# Patient Record
Sex: FEMALE | Race: WHITE | NOT HISPANIC OR LATINO | Employment: FULL TIME | URBAN - METROPOLITAN AREA
[De-identification: names, ages, dates, MRNs, and addresses within clinical notes are randomized per-mention and may not be internally consistent; named-entity substitution may affect disease eponyms.]

---

## 2018-07-26 ENCOUNTER — RESULT FOLLOW UP (OUTPATIENT)
Dept: OBGYN | Facility: CLINIC | Age: 33
End: 2018-07-26

## 2018-07-26 ENCOUNTER — OFFICE VISIT (OUTPATIENT)
Dept: OBGYN | Facility: CLINIC | Age: 33
End: 2018-07-26
Payer: COMMERCIAL

## 2018-07-26 VITALS
SYSTOLIC BLOOD PRESSURE: 116 MMHG | DIASTOLIC BLOOD PRESSURE: 64 MMHG | HEIGHT: 66 IN | WEIGHT: 292 LBS | HEART RATE: 68 BPM | BODY MASS INDEX: 46.93 KG/M2

## 2018-07-26 DIAGNOSIS — B37.2 CUTANEOUS CANDIDIASIS: ICD-10-CM

## 2018-07-26 DIAGNOSIS — Z01.419 ENCOUNTER FOR GYNECOLOGICAL EXAMINATION WITHOUT ABNORMAL FINDING: Primary | ICD-10-CM

## 2018-07-26 DIAGNOSIS — R87.810 CERVICAL HIGH RISK HPV (HUMAN PAPILLOMAVIRUS) TEST POSITIVE: ICD-10-CM

## 2018-07-26 DIAGNOSIS — L90.0 LICHEN SCLEROSUS: ICD-10-CM

## 2018-07-26 DIAGNOSIS — E66.01 MORBID OBESITY (H): ICD-10-CM

## 2018-07-26 PROCEDURE — G0145 SCR C/V CYTO,THINLAYER,RESCR: HCPCS | Performed by: NURSE PRACTITIONER

## 2018-07-26 PROCEDURE — 99395 PREV VISIT EST AGE 18-39: CPT | Performed by: NURSE PRACTITIONER

## 2018-07-26 PROCEDURE — 87624 HPV HI-RISK TYP POOLED RSLT: CPT | Performed by: NURSE PRACTITIONER

## 2018-07-26 RX ORDER — CLOBETASOL PROPIONATE 0.5 MG/G
OINTMENT TOPICAL
Qty: 30 G | Refills: 2 | Status: SHIPPED | OUTPATIENT
Start: 2018-07-26 | End: 2018-09-26

## 2018-07-26 RX ORDER — NYSTATIN 100000 [USP'U]/G
POWDER TOPICAL 3 TIMES DAILY PRN
Qty: 60 G | Refills: 3 | Status: SHIPPED | OUTPATIENT
Start: 2018-07-26 | End: 2022-02-09

## 2018-07-26 ASSESSMENT — ANXIETY QUESTIONNAIRES
5. BEING SO RESTLESS THAT IT IS HARD TO SIT STILL: NOT AT ALL
7. FEELING AFRAID AS IF SOMETHING AWFUL MIGHT HAPPEN: NOT AT ALL
IF YOU CHECKED OFF ANY PROBLEMS ON THIS QUESTIONNAIRE, HOW DIFFICULT HAVE THESE PROBLEMS MADE IT FOR YOU TO DO YOUR WORK, TAKE CARE OF THINGS AT HOME, OR GET ALONG WITH OTHER PEOPLE: NOT DIFFICULT AT ALL
GAD7 TOTAL SCORE: 3
3. WORRYING TOO MUCH ABOUT DIFFERENT THINGS: NOT AT ALL
2. NOT BEING ABLE TO STOP OR CONTROL WORRYING: SEVERAL DAYS
1. FEELING NERVOUS, ANXIOUS, OR ON EDGE: SEVERAL DAYS
6. BECOMING EASILY ANNOYED OR IRRITABLE: NOT AT ALL

## 2018-07-26 ASSESSMENT — PATIENT HEALTH QUESTIONNAIRE - PHQ9: 5. POOR APPETITE OR OVEREATING: SEVERAL DAYS

## 2018-07-26 NOTE — PROGRESS NOTES
Melinda is a 32 year old  female who presents for annual exam.     Besides routine health maintenance, she has no other health concerns today .    HPI:  The patient's PCP is  [facility].  Pt here today for her annual exam. She is feeling okay. She is working on a weight loss program with her daughter. She is frustrated because she can't loose any weight.     She has a hx of endometriosis, s/p LASH BSO. No HRT. No hot flashes, some vaginal dryness and itching but she also has lichen sclerosis that she hasn't treated in quite some time.     She c/o yeast infections and sores under her breasts and under her abdominal fold/pannus due to sweat and her obesity. She tries to keep it dry but also will apply creams to the area.     GYNECOLOGIC HISTORY:    No LMP recorded. Patient has had a hysterectomy.  Her current contraception method is: none.  She  reports that she has never smoked. She has never used smokeless tobacco.    Patient is sexually active.  STD testing offered?  Declined  Last PHQ-9 score on record =   PHQ-9 SCORE 2018   Total Score 2     Last GAD7 score on record =   BERTIN-7 SCORE 2018   Total Score 3     Alcohol Score = 2    HEALTH MAINTENANCE:  Cholesterol: 16  Total= 192, Triglycerides=100, HDL=35, ESH=760    Cholesterol   Date Value Ref Range Status   2016 192 <200 mg/dL Final   2013 188 125 - 200 mg/dL Final      Last Mammo: never, Result: not applicable, Next Mammo: today due at age 38.  Pap: 13 NIL   Lab Results   Component Value Date    PAP NIL 2013     Colonoscopy:  NEVER, Result: not applicable, Next Colonoscopy: DUE AT AGE 48.  Dexa:  NEVER    Health maintenance updated:  yes    HISTORY:  Obstetric History       T2      L2     SAB0   TAB0   Ectopic0   Multiple0   Live Births2       # Outcome Date GA Lbr Toni/2nd Weight Sex Delivery Anes PTL Lv   2 Term 08 39w0d  8 lb (3.629 kg) M -SEC   SIERRA      Name: Chon Baum  Term  40w0d  8 lb 3 oz (3.714 kg) F -SEC   [facility]ERRA      Name: Anastasiia          Patient Active Problem List   Diagnosis     Morbid obesity (H)     Past Surgical History:   Procedure Laterality Date      SECTION  ,      CHOLECYSTECTOMY  2008     DILATION AND CURETTAGE  2008, 2009-inactive and secretory endometrium; -secretory endometrium     LAPAROSCOPIC HYSTERECTOMY SUPRACERVICAL, BILATERAL SALPINGO-OOPHORECTOMY, COMBINED  2009    inactive endometrium;bilateral endometriosis in right and left ovaries     LAPAROSCOPY DIAGNOSTIC (GYN)  , ,      TONSILLECTOMY  2010      Social History   Substance Use Topics     Smoking status: Never Smoker     Smokeless tobacco: Never Used     Alcohol use No      Problem (# of Occurrences) Relation (Name,Age of Onset)    Diabetes (2) Maternal Grandmother, Paternal Grandmother    HEART DISEASE (2) Maternal Grandmother, Paternal Grandfather    Hyperlipidemia (3) Maternal Grandmother, Mother, Other: Aunt, unspecified    Hypertension (3) Maternal Grandmother, Mother, Other: Aunt, unspecified    Osteoperosis (1) Maternal Grandmother    Thyroid Disease (2) Maternal Grandmother, Mother            Current Outpatient Prescriptions   Medication Sig     clobetasol (TEMOVATE) 0.05 % ointment Apply sparingly to affected area twice daily for 2 weeks, then take 2 weeks off and repeat.  Do not apply to face.     nystatin (MYCOSTATIN) 452675 UNIT/GM POWD Apply topically 3 times daily as needed To dry skin     SUMAtriptan Succinate (IMITREX PO) Take 50 mg by mouth every 8 hours as needed.       topiramate (TOPAMAX) 100 MG tablet TAKE 1 TABLET BY MOUTH TWICE A DAY     No current facility-administered medications for this visit.      Allergies   Allergen Reactions     Amoxicillin Hives     Penicillins        Past medical, surgical, social and family histories were reviewed and updated in EPIC.    ROS:   12 point review of systems negative  "other than symptoms noted below.  Gastrointestinal: Heartburn  Genitourinary: Vaginal Dryness and Vaginal Itching  Neurologic: Headaches  Musculoskeletal: Joint Pain    EXAM:  /64  Pulse 68  Ht 5' 6\" (1.676 m)  Wt 292 lb (132.5 kg)  BMI 47.13 kg/m2   BMI: Body mass index is 47.13 kg/(m^2).    PHYSICAL EXAM:  Constitutional:  Appearance: Well nourished, well developed, alert, in no acute distress  Neck:  Lymph Nodes:  No lymphadenopathy present    Thyroid:  Gland size normal, nontender, no nodules or masses present  on palpation  Chest:  Respiratory Effort:  Breathing unlabored  Cardiovascular:    Heart: Auscultation:  Regular rate, normal rhythm, no murmurs present  Breasts: Inspection of Breasts:  No lymphadenopathy present., Palpation of Breasts and Axillae:  No masses present on palpation, no breast tenderness., Axillary Lymph Nodes:  No lymphadenopathy present. and No nodularity, asymmetry or nipple discharge bilaterally.  Gastrointestinal:   Abdominal Examination:  Abdomen nontender to palpation, tone normal without rigidity or guarding, no masses present, umbilicus without lesions   Liver and Spleen:  No hepatomegaly present, liver nontender to palpation    Hernias:  No hernias present  Lymphatic: Lymph Nodes:  No other lymphadenopathy present  Skin:  General Inspection:  No rashes present, no lesions present, no areas of  Discoloration- scar red, no signs of infection. Irritated. Needs nystatin powder.     Genitalia and Groin:  No rashes present, no lesions present, no areas of  discoloration, no masses present  Neurologic/Psychiatric:    Mental Status:  Oriented X3     Pelvic Exam:  External Genitalia:     Normal appearance for age, no discharge present, no tenderness present, no inflammatory lesions present, color normal- white epithelium on bilateral labia majora and at introitus.   Vagina:     Normal vaginal vault without central or paravaginal defects, no discharge present, no " inflammatory lesions present, no masses present  Bladder:     Nontender to palpation  Urethra:   Urethral Body:  Urethra palpation normal, urethra structural support normal   Urethral Meatus:  No erythema or lesions present  Cervix:    Appearance healthy, no lesions present, nontender to palpation, no bleeding present  Uterus:     Surgically absent  Adnexa:     Surgically absent  Perineum:     Perineum within normal limits, no evidence of trauma, no rashes or skin lesions present  Anus:     Anus within normal limits, no hemorrhoids present  Inguinal Lymph Nodes:     No lymphadenopathy present  Pubic Hair:     Normal pubic hair distribution for age  Genitalia and Groin:     No rashes present, no lesions present, no areas of discoloration, no masses present      COUNSELING:   Special attention given to:        Regular exercise       Healthy diet/nutrition    BMI: Body mass index is 47.13 kg/(m^2).  Weight management plan: Discussed healthy diet and exercise guidelines and patient will follow up in 12 months in clinic to re-evaluate.    ASSESSMENT:  32 year old female with satisfactory annual exam.    ICD-10-CM    1. Encounter for gynecological examination without abnormal finding Z01.419 Pap imaged thin layer screen with HPV - recommended age 30 - 65     HPV High Risk Types DNA Cervical   2. Lichen sclerosus L90.0 clobetasol (TEMOVATE) 0.05 % ointment   3. Morbid obesity (H) E66.01    4. Cutaneous candidiasis B37.2 nystatin (MYCOSTATIN) 801871 UNIT/GM POWD       PLAN:  Morbidly obese female s/p [facility] BSO-discussed vaginal atrophy and vaginal E2 cream. Pt will call if she needs it. Manages with lubricants at this time. Annual pap screening recommended. Refill of clobetasol and she will start using nystatin powder under her breasts and her abdominal pannus for veroncia.     Antonina Penny APRN CNP

## 2018-07-26 NOTE — MR AVS SNAPSHOT
"              After Visit Summary   7/26/2018    Melinda Ravi    MRN: [id number]           Patient Information     Date Of Birth          1985        Visit Information        Provider Department      7/26/2018 7:30 AM Antonina Penny APRN CNP [facility]        Today's Diagnoses     Encounter for gynecological examination without abnormal finding    -  1    Lichen sclerosus        Morbid obesity (H)        Cutaneous candidiasis           Follow-ups after your visit        Who to contact     If you have questions or need follow up information about today's clinic visit or your schedule please contact [facility] directly at [phone].  Normal or non-critical lab and imaging results will be communicated to you by MyChart, letter or phone within 4 business days after the clinic has received the results. If you do not hear from us within 7 days, please contact the clinic through MyChart or phone. If you have a critical or abnormal lab result, we will notify you by phone as soon as possible.  Submit refill requests through [email or web address] or call your pharmacy and they will forward the refill request to us. Please allow 3 business days for your refill to be completed.          Additional Information About Your Visit        Care EveryWhere ID     This is your Care EveryWhere ID. This could be used by other organizations to access your [place] medical records  [id number]        Your Vitals Were     Pulse Height BMI (Body Mass Index)             68 5' 6\" (1.676 m) 47.13 kg/m2          Blood Pressure from Last 3 Encounters:   07/26/18 116/64   09/02/16 114/74   08/13/12 110/66    Weight from Last 3 Encounters:   07/26/18 292 lb (132.5 kg)   09/02/16 279 lb (126.6 kg)              Today, you had the following     No orders found for display       Primary Care Provider Office Phone # Fax #    [facility] [phone] [address] [facility] [address]        Equal Access to Services     [facility] RAFAEL : Hadii aad ku hadcarolinetoney La, wakarinewinnie perdomo, qaabhilashkelsie beemaryan smyth. So [facility] [phone].    ATENCIÓN: Si habla español, tiene a tariq disposición servicios gratuitos de asistencia lingüística. [facility] [phone].    We comply with applicable federal civil rights laws and [place] laws. We do not discriminate on the basis of race, color, national origin, age, disability, sex, sexual orientation, or gender identity.            Thank you!     Thank you for choosing [facility]  for your care. Our goal is always to provide you with excellent care. Hearing back from our patients is one way we can continue to improve our services. Please take a few minutes to complete the written survey that you may receive in the mail after your visit with us. Thank you!             Your Updated Medication List - Protect others around you: Learn how to safely use, store and throw away your medicines at www.disposemymeds.org.          This list is accurate as of 7/26/18  8:19 AM.  Always use your most recent med list.                   Brand Name Dispense Instructions for use Diagnosis    clobetasol 0.05 % ointment    TEMOVATE    30 g    Apply sparingly to affected area twice daily for 14 days, then apply qhs x 6 weeks.  Do not apply to face.    Lichen sclerosus       IMITREX PO      Take 50 mg by mouth every 8 hours as needed.        topiramate 100 MG tablet    TOPAMAX     TAKE 1 TABLET BY MOUTH TWICE A DAY

## 2018-07-26 NOTE — LETTER
July 23, 2019      Melinda [place]  [address] [facility] [address]    Dear MsYovannyluis,      At [facility], your health and wellness is our primary concern. That is why we are following up on a colposcopy from 08/07/18, which was reported as CARLINE 1. Your provider had recommended that you have a Pap smear and HPV test completed by 08/07/19. Our records do not show that this has been scheduled.    It is important to complete the follow up that your provider has suggested for you to ensure that there are no worsening changes which may, over time, develop into cancer.      Please contact our office at  [phone] to schedule an appointment for a Pap smear and HPV test at your earliest convenience. If you have questions or concerns, please call the clinic and we will be happy to assist you.    If you have completed the tests outside of [facility], please have the results forwarded to our office. We will update the chart for your primary Physician to review before your next annual physical.     Thank you for choosing [facility]!    Sincerely,      Your [facility] Care Team/[facility]

## 2018-07-27 ASSESSMENT — ANXIETY QUESTIONNAIRES: GAD7 TOTAL SCORE: 3

## 2018-07-27 ASSESSMENT — PATIENT HEALTH QUESTIONNAIRE - PHQ9: SUM OF ALL RESPONSES TO PHQ QUESTIONS 1-9: 2

## 2018-07-30 LAB
COPATH REPORT: NORMAL
PAP: NORMAL

## 2018-07-31 LAB
FINAL DIAGNOSIS: ABNORMAL
HPV HR 12 DNA CVX QL NAA+PROBE: POSITIVE
HPV16 DNA SPEC QL NAA+PROBE: POSITIVE
HPV18 DNA SPEC QL NAA+PROBE: NEGATIVE
SPECIMEN DESCRIPTION: ABNORMAL
SPECIMEN SOURCE CVX/VAG CYTO: ABNORMAL

## 2018-07-31 NOTE — PROGRESS NOTES
7/26/18 NIL pap, + HR HPV 16 & other.  Plan: colposcopy by 10/26/18  8/1/18 advised of result and follow up  8/7/18 Colpo: ECC-benign.  Bx-CARLINE 1. Plan: cotest in 1 year  07/23/19 Cotest reminder letter sent. ([facility])  08/21/19 [facility] clinic and schedule. ([facility])  09/18/19 Patient is lost to pap tracking follow-up. FYI routed to provider. ([facility])

## 2018-08-07 ENCOUNTER — OFFICE VISIT (OUTPATIENT)
Dept: OBGYN | Facility: CLINIC | Age: 33
End: 2018-08-07
Payer: COMMERCIAL

## 2018-08-07 VITALS
WEIGHT: 292 LBS | HEIGHT: 66 IN | SYSTOLIC BLOOD PRESSURE: 112 MMHG | BODY MASS INDEX: 46.93 KG/M2 | DIASTOLIC BLOOD PRESSURE: 64 MMHG

## 2018-08-07 DIAGNOSIS — N90.4 LICHEN SCLEROSUS ET ATROPHICUS OF THE VULVA: ICD-10-CM

## 2018-08-07 DIAGNOSIS — R87.810 CERVICAL HIGH RISK HPV (HUMAN PAPILLOMAVIRUS) TEST POSITIVE: ICD-10-CM

## 2018-08-07 DIAGNOSIS — Z01.812 PRE-PROCEDURE LAB EXAM: Primary | ICD-10-CM

## 2018-08-07 LAB — BETA HCG QUAL IFA URINE: NEGATIVE

## 2018-08-07 PROCEDURE — 84703 CHORIONIC GONADOTROPIN ASSAY: CPT | Performed by: OBSTETRICS & GYNECOLOGY

## 2018-08-07 PROCEDURE — 57454 BX/CURETT OF CERVIX W/SCOPE: CPT | Performed by: OBSTETRICS & GYNECOLOGY

## 2018-08-07 PROCEDURE — 88305 TISSUE EXAM BY PATHOLOGIST: CPT | Performed by: OBSTETRICS & GYNECOLOGY

## 2018-08-07 RX ORDER — BETAMETHASONE DIPROPIONATE 0.5 MG/G
CREAM TOPICAL
Qty: 50 G | Refills: 3 | Status: SHIPPED | OUTPATIENT
Start: 2018-08-07 | End: 2019-02-15

## 2018-08-07 NOTE — MR AVS SNAPSHOT
"              After Visit Summary   8/7/2018    Melinda Ravi    MRN: [id number]           Patient Information     Date Of Birth          1985        Visit Information        Provider Department      8/7/2018 10:00 AM Hilary Salazar MD; WE COLPOSCOPE 1 [facility]        Today's Diagnoses     Pre-procedure lab exam    -  1    Cervical high risk HPV (human papillomavirus) test positive        Lichen sclerosus et atrophicus of the vulva           Follow-ups after your visit        Who to contact     If you have questions or need follow up information about today's clinic visit or your schedule please contact [facility] directly at [phone].  Normal or non-critical lab and imaging results will be communicated to you by MyChart, letter or phone within 4 business days after the clinic has received the results. If you do not hear from us within 7 days, please contact the clinic through MyChart or phone. If you have a critical or abnormal lab result, we will notify you by phone as soon as possible.  Submit refill requests through [employer] or call your pharmacy and they will forward the refill request to us. Please allow 3 business days for your refill to be completed.          Additional Information About Your Visit        Care EveryWhere ID     This is your Care EveryWhere ID. This could be used by other organizations to access your [place] medical records  [id number]        Your Vitals Were     Height BMI (Body Mass Index)                5' 6\" (1.676 m) 47.13 kg/m2           Blood Pressure from Last 3 Encounters:   08/07/18 112/64   07/26/18 116/64   09/02/16 114/74    Weight from Last 3 Encounters:   08/07/18 292 lb (132.5 kg)   07/26/18 292 lb (132.5 kg)   09/02/16 279 lb (126.6 kg)              We Performed the Following     Beta HCG Qual, Urine - [facility] and [place] ([id number])     COLP CERVIX/UPPER VAGINA W ENDOCERV CURETT     Surgical " pathology exam          Today's Medication Changes          These changes are accurate as of 8/7/18 10:42 AM.  If you have any questions, ask your nurse or doctor.               Start taking these medicines.        Dose/Directions    augmented betamethasone dipropionate 0.05 % cream   Commonly known as:  DIPROLENE-AF   Used for:  Lichen sclerosus et atrophicus of the vulva   Started by:  Hilary Salazar MD        Apply sparingly to affected area  twice daily for 14 days.  Do not apply to face.   Quantity:  50 g   Refills:  3            Where to get your medicines      These medications were sent to [facility] [address]  [address], [facility] [address]     Phone:  [phone]     augmented betamethasone dipropionate 0.05 % cream                Primary Care Provider Office Phone # Fax #    [facility] [phone] [address] [place] [address]        Equal Access to Services     [facility]: Hadii yoan ku hadasho Soomaali, waaxda luqadaha, qaybta kaalmada adeegyada, waxay juhiin haywilton estrada . So [facility] [phone].    ATENCIÓN: Si habla español, tiene a tariq disposición servicios gratuitos de asistencia lingüística. [facility] [phone].    We comply with applicable federal civil rights laws and [place] laws. We do not discriminate on the basis of race, color, national origin, age, disability, sex, sexual orientation, or gender identity.            Thank you!     Thank you for choosing [facility]  for your care. Our goal is always to provide you with excellent care. Hearing back from our patients is one way we can continue to improve our services. Please take a few minutes to complete the written survey that you may receive in the mail after your visit with us. Thank you!             Your Updated Medication List - Protect  others around you: Learn how to safely use, store and throw away your medicines at www.disposemymeds.org.          This list is accurate as of 8/7/18 10:42 AM.  Always use your most recent med list.                   Brand Name Dispense Instructions for use Diagnosis    augmented betamethasone dipropionate 0.05 % cream    DIPROLENE-AF    50 g    Apply sparingly to affected area  twice daily for 14 days.  Do not apply to face.    Lichen sclerosus et atrophicus of the vulva       clobetasol 0.05 % ointment    TEMOVATE    30 g    Apply sparingly to affected area twice daily for 2 weeks, then take 2 weeks off and repeat.  Do not apply to face.    Lichen sclerosus       IMITREX PO      Take 50 mg by mouth every 8 hours as needed.        nystatin [id number] UNIT/GM Powd    MYCOSTATIN    60 g    Apply topically 3 times daily as needed To dry skin    Cutaneous candidiasis       topiramate 100 MG tablet    TOPAMAX     TAKE 1 TABLET BY MOUTH TWICE A DAY

## 2018-08-07 NOTE — PROGRESS NOTES
INDICATIONS:                                                    Is a pregnancy test required: No    Was a consent obtained?  Yes      Melinda Ravi, is a 32 year old female, who had a recent NILM pap.  HPV 16 and other positive.  No prior history of abnormal pap. S/P LASH. Here today for colposcopy. Discussed indication, risks of infection and bleeding.    Her last pap was   Lab Results   Component Value Date    PAP NIL 07/26/2018    .    PROCEDURE:                                                      Cervix is stained with acetic acid and viewed colposcopically. Squamocolumnar junction is not visualized in it's entirety. abnormal vessels noted at 5 o'clock . Biopsy done Yes. Endocervical curretage Done         POST PROCEDURE:                                                      IMPRESSION: Patient tolerated procedure well, colposcopy adequate and HPV    PLAN : Await the results of the biopsies.  Repeat pap in 12 months.  She  tolerated the procedure well with minimal discomfort. There were no complications. Patient was discharged in stable condition.    Patient advised to call the clinic if excessive bleeding, pelvic pain, or fever.     Follow-up plan based on pathology results.    Hilary Salazar MD

## 2018-08-09 LAB — COPATH REPORT: NORMAL

## 2018-09-26 ENCOUNTER — NURSE TRIAGE (OUTPATIENT)
Dept: NURSING | Facility: CLINIC | Age: 33
End: 2018-09-26

## 2018-09-26 DIAGNOSIS — L90.0 LICHEN SCLEROSUS: ICD-10-CM

## 2018-09-26 RX ORDER — CLOBETASOL PROPIONATE 0.5 MG/G
OINTMENT TOPICAL
Qty: 30 G | Refills: 2 | Status: SHIPPED | OUTPATIENT
Start: 2018-09-26 | End: 2019-02-15

## 2018-09-26 NOTE — TELEPHONE ENCOUNTER
Patient last saw Antonina Penny on 7/26/18  Refill request for clobetasol (TEMOVATE) 0.05 % ointment  Routing refill request to provider for review/approval because:  Drug not on the G refill protocol     Vernell Iglesias RN

## 2018-09-26 NOTE — TELEPHONE ENCOUNTER
"\"Lichen sclerosis\" has used cream in the past and needs some now because she is having a flair up. Please call her.   Jasmin Richardson RN-[facility]  "

## 2018-09-26 NOTE — TELEPHONE ENCOUNTER
Lichen sclerosis has used cream in the past and needs some now because she is having a flair up. Please call her.  Epic encounter sent to the patient's clinic.    Jasmin Richardson RN-[facility]

## 2019-02-15 ENCOUNTER — OFFICE VISIT (OUTPATIENT)
Dept: OBGYN | Facility: CLINIC | Age: 34
End: 2019-02-15
Payer: COMMERCIAL

## 2019-02-15 VITALS — SYSTOLIC BLOOD PRESSURE: 100 MMHG | DIASTOLIC BLOOD PRESSURE: 64 MMHG | WEIGHT: 283 LBS | BODY MASS INDEX: 45.68 KG/M2

## 2019-02-15 DIAGNOSIS — N90.4 LICHEN SCLEROSUS ET ATROPHICUS OF THE VULVA: Primary | ICD-10-CM

## 2019-02-15 PROCEDURE — 99213 OFFICE O/P EST LOW 20 MIN: CPT | Performed by: NURSE PRACTITIONER

## 2019-02-15 RX ORDER — TRAZODONE HYDROCHLORIDE 50 MG/1
100 TABLET, FILM COATED ORAL
Refills: 1 | COMMUNITY
Start: 2019-01-18 | End: 2022-02-09

## 2019-02-15 RX ORDER — BUPROPION HYDROCHLORIDE 150 MG/1
300 TABLET ORAL
COMMUNITY
Start: 2019-01-18 | End: 2022-02-09

## 2019-02-15 RX ORDER — BETAMETHASONE DIPROPIONATE 0.5 MG/G
CREAM TOPICAL
Qty: 50 G | Refills: 3 | Status: SHIPPED | OUTPATIENT
Start: 2019-02-15 | End: 2020-10-16 | Stop reason: ALTCHOICE

## 2019-02-15 RX ORDER — CLOBETASOL PROPIONATE 0.5 MG/G
OINTMENT TOPICAL
Qty: 60 G | Refills: 3 | Status: SHIPPED | OUTPATIENT
Start: 2019-02-15 | End: 2020-10-16

## 2019-02-15 NOTE — PROGRESS NOTES
SUBJECTIVE:                                                   Melinda Ravi is a 33 year old female who presents to clinic today for the following health issue(s):  Patient presents with:  Vaginal Problem          HPI:  Pt here today with c/o flare up of LSEA. She states she hasn't used her cream for 1 month because she ran out.     She started to have itching and applied vagisil which burned. She has had some bleeding from itching/scratching.     Pt was in for a colpo in August and was given a prescription for betamethasone. Pt states one of the medications wasn't covered by insurance but she can't remember which one.     No LMP recorded. Patient has had a hysterectomy..   Patient is sexually active, .  Using hysterectomy for contraception.    reports that  has never smoked. she has never used smokeless tobacco.    STD testing offered?  Declined    Health maintenance updated:  yes    Today's PHQ-2 Score: No flowsheet data found.  Today's PHQ-9 Score:   PHQ-9 SCORE 2018   PHQ-9 Total Score 2     Today's BERTIN-7 Score:   BERTIN-7 SCORE 2018   Total Score 3       Problem list and histories reviewed & adjusted, as indicated.  Additional history: as documented.    Patient Active Problem List   Diagnosis     Morbid obesity (H)     Cervical high risk HPV (human papillomavirus) test positive     Lichen sclerosus et atrophicus of the vulva     Past Surgical History:   Procedure Laterality Date      SECTION  ,      CHOLECYSTECTOMY  2008     DILATION AND CURETTAGE  2008, 2009-inactive and secretory endometrium; -secretory endometrium     LAPAROSCOPIC HYSTERECTOMY SUPRACERVICAL, BILATERAL SALPINGO-OOPHORECTOMY, COMBINED  2009    inactive endometrium;bilateral endometriosis in right and left ovaries     LAPAROSCOPY DIAGNOSTIC (GYN)  , ,      TONSILLECTOMY  2010      Social History     Tobacco Use     Smoking status: Never Smoker      Smokeless tobacco: Never Used   Substance Use Topics     Alcohol use: No     Alcohol/week: 0.0 oz      Problem (# of Occurrences) Relation (Name,Age of Onset)    Diabetes (2) Maternal Grandmother, Paternal Grandmother    Heart Disease (2) Maternal Grandmother, Paternal Grandfather    Hyperlipidemia (3) Maternal Grandmother, Mother, Other: Aunt, unspecified    Hypertension (3) Maternal Grandmother, Mother, Other: Aunt, unspecified    Osteoporosis (1) Maternal Grandmother    Thyroid Disease (2) Maternal Grandmother, Mother            Current Outpatient Medications   Medication Sig     augmented betamethasone dipropionate (DIPROLENE-AF) 0.05 % external cream Apply sparingly to affected area  twice daily for 14 days. Take a 2 week break and repeat. Do not apply to face.     buPROPion (WELLBUTRIN XL) 150 MG 24 hr tablet Take 300 mg by mouth     clobetasol (TEMOVATE) 0.05 % external ointment Apply sparingly to affected area twice daily for 2 weeks, then take 2 weeks off and repeat.  Do not apply to face.     SUMAtriptan Succinate (IMITREX PO) Take 50 mg by mouth every 8 hours as needed.       topiramate (TOPAMAX) 100 MG tablet TAKE 1 TABLET BY MOUTH TWICE A DAY     traZODone (DESYREL) 50 MG tablet TK 1 T PO HS     nystatin (MYCOSTATIN) 482347 UNIT/GM POWD Apply topically 3 times daily as needed To dry skin (Patient not taking: Reported on 2/15/2019)     No current facility-administered medications for this visit.      Allergies   Allergen Reactions     Amoxicillin Hives     Penicillins        ROS:  12 point review of systems negative other than symptoms noted below.  Constitutional: Fatigue  Genitourinary: Painful Wilkerson, Painful Urination, Vaginal Dryness and Vaginal Itching  Skin: Skin Dryness  Neurologic: Headaches  Musculoskeletal: Joint Pain  Psychiatric: Anxiety and Depression    OBJECTIVE:     /64   Wt 128.4 kg (283 lb)   Breastfeeding? No   BMI 45.68 kg/m    Body mass index is 45.68  kg/m .    Exam:  Constitutional:  Appearance: Well nourished, well developed alert, in no acute distress  Neurologic/Psychiatric:  Mental Status:  Oriented X3   Pelvic Exam:  External Genitalia:     WELL DEMARKATED AREA OF HYPOPIGMENTED SKIN FROM CLITORAL CARDENAS DOWN TO ANUS. WIDE 4CM BORDER AT ANUS. LARGE QUARTER SIZED OPEN SKIN ON POSTERIOR FOURCHETTE, OPEN AREA OF SKIN BILATERAL INTROITUS. SKIN TEARS IN LABIAL FOLDS  Perineum:     Perineum within normal limits, no evidence of trauma, no rashes or skin lesions present  Anus:     Anus within normal limits, no hemorrhoids present  Inguinal Lymph Nodes:     No lymphadenopathy present  Pubic Hair:     Normal pubic hair distribution for age  Genitalia and Groin:     No rashes present, no lesions present, no areas of discoloration, no masses present       In-Clinic Test Results:  No results found for this or any previous visit (from the past 24 hour(s)).    ASSESSMENT/PLAN:                                                        ICD-10-CM    1. Lichen sclerosus et atrophicus of the vulva N90.4 clobetasol (TEMOVATE) 0.05 % external ointment     augmented betamethasone dipropionate (DIPROLENE-AF) 0.05 % external cream       There are no Patient Instructions on file for this visit.    Pt with untreated LSEA. Discussed that both of her prescriptions have refills on them. She was not aware of this. RX sent for both as we don't know which is covered by insurance. Discussed in detail that she should not have a problem running out of cream as they have plenty of refills on them. encouraged her to sign up for [employer] because she works odd hours and struggles to get a hold of us during business hours. Encouraged her to also download her pharmacy pipe to track and request refills easier due to her work schedule.     [facility]ANNE [facility]

## 2019-07-11 ENCOUNTER — TELEPHONE (OUTPATIENT)
Dept: OBGYN | Facility: CLINIC | Age: 34
End: 2019-07-11

## 2019-07-11 NOTE — TELEPHONE ENCOUNTER
Pt calling inquiring on ABO/Rh  7/30/2008 - O Positive    Pt verbalized understanding, in agreement with plan, and voiced no further questions.

## 2019-08-21 ENCOUNTER — TELEPHONE (OUTPATIENT)
Dept: OBGYN | Facility: CLINIC | Age: 34
End: 2019-08-21

## 2019-08-21 NOTE — TELEPHONE ENCOUNTER
Pt is past due for f/u pap smear.  [facility] clinic and schedule.    Mary Mack  Pap Tracking

## 2020-06-20 ENCOUNTER — HOSPITAL ENCOUNTER (EMERGENCY)
Facility: CLINIC | Age: 35
Discharge: HOME | End: 2020-06-20
Attending: PHYSICIAN ASSISTANT | Admitting: PHYSICIAN ASSISTANT
Payer: COMMERCIAL

## 2020-06-20 ENCOUNTER — APPOINTMENT (OUTPATIENT)
Dept: GENERAL RADIOLOGY | Facility: CLINIC | Age: 35
End: 2020-06-20
Attending: PHYSICIAN ASSISTANT
Payer: COMMERCIAL

## 2020-06-20 VITALS
OXYGEN SATURATION: 98 % | RESPIRATION RATE: 24 BRPM | WEIGHT: 250 LBS | TEMPERATURE: 98 F | SYSTOLIC BLOOD PRESSURE: 109 MMHG | BODY MASS INDEX: 39.24 KG/M2 | HEIGHT: 67 IN | HEART RATE: 87 BPM | DIASTOLIC BLOOD PRESSURE: 66 MMHG

## 2020-06-20 DIAGNOSIS — R07.9 CHEST PAIN: ICD-10-CM

## 2020-06-20 LAB
ANION GAP SERPL CALCULATED.3IONS-SCNC: 6 MMOL/L (ref 3–14)
BASOPHILS # BLD AUTO: 0 10E9/L (ref 0–0.2)
BASOPHILS NFR BLD AUTO: 0.4 %
BUN SERPL-MCNC: 8 MG/DL (ref 7–30)
CALCIUM SERPL-MCNC: 8.9 MG/DL (ref 8.5–10.1)
CHLORIDE SERPL-SCNC: 104 MMOL/L (ref 94–109)
CO2 SERPL-SCNC: 30 MMOL/L (ref 20–32)
CREAT SERPL-MCNC: 0.79 MG/DL (ref 0.52–1.04)
DIFFERENTIAL METHOD BLD: ABNORMAL
EOSINOPHIL # BLD AUTO: 0.2 10E9/L (ref 0–0.7)
EOSINOPHIL NFR BLD AUTO: 2.7 %
ERYTHROCYTE [DISTWIDTH] IN BLOOD BY AUTOMATED COUNT: 12.3 % (ref 10–15)
GFR SERPL CREATININE-BSD FRML MDRD: >90 ML/MIN/{1.73_M2}
GLUCOSE SERPL-MCNC: 84 MG/DL (ref 70–99)
HCT VFR BLD AUTO: 44.1 % (ref 35–47)
HGB BLD-MCNC: 13.7 G/DL (ref 11.7–15.7)
IMM GRANULOCYTES # BLD: 0 10E9/L (ref 0–0.4)
IMM GRANULOCYTES NFR BLD: 0.3 %
LYMPHOCYTES # BLD AUTO: 1.6 10E9/L (ref 0.8–5.3)
LYMPHOCYTES NFR BLD AUTO: 23.7 %
MCH RBC QN AUTO: 27.1 PG (ref 26.5–33)
MCHC RBC AUTO-ENTMCNC: 31.1 G/DL (ref 31.5–36.5)
MCV RBC AUTO: 87 FL (ref 78–100)
MONOCYTES # BLD AUTO: 0.4 10E9/L (ref 0–1.3)
MONOCYTES NFR BLD AUTO: 5.8 %
NEUTROPHILS # BLD AUTO: 4.6 10E9/L (ref 1.6–8.3)
NEUTROPHILS NFR BLD AUTO: 67.1 %
NRBC # BLD AUTO: 0 10*3/UL
NRBC BLD AUTO-RTO: 0 /100
PLATELET # BLD AUTO: 298 10E9/L (ref 150–450)
POTASSIUM SERPL-SCNC: 3.9 MMOL/L (ref 3.4–5.3)
RBC # BLD AUTO: 5.06 10E12/L (ref 3.8–5.2)
SODIUM SERPL-SCNC: 140 MMOL/L (ref 133–144)
TROPONIN I SERPL-MCNC: <0.015 UG/L (ref 0–0.04)
TROPONIN I SERPL-MCNC: <0.015 UG/L (ref 0–0.04)
WBC # BLD AUTO: 6.9 10E9/L (ref 4–11)

## 2020-06-20 PROCEDURE — 80048 BASIC METABOLIC PNL TOTAL CA: CPT | Performed by: PHYSICIAN ASSISTANT

## 2020-06-20 PROCEDURE — 85025 COMPLETE CBC W/AUTO DIFF WBC: CPT | Performed by: PHYSICIAN ASSISTANT

## 2020-06-20 PROCEDURE — 93005 ELECTROCARDIOGRAM TRACING: CPT

## 2020-06-20 PROCEDURE — 71046 X-RAY EXAM CHEST 2 VIEWS: CPT

## 2020-06-20 PROCEDURE — 84484 ASSAY OF TROPONIN QUANT: CPT | Performed by: PHYSICIAN ASSISTANT

## 2020-06-20 PROCEDURE — 99285 EMERGENCY DEPT VISIT HI MDM: CPT | Mod: 25

## 2020-06-20 ASSESSMENT — ENCOUNTER SYMPTOMS
COUGH: 0
FEVER: 0
FATIGUE: 0
SHORTNESS OF BREATH: 0
ABDOMINAL PAIN: 0
NAUSEA: 0
CHILLS: 0
CHEST TIGHTNESS: 0
VOMITING: 0
WHEEZING: 0
DIAPHORESIS: 0

## 2020-06-20 ASSESSMENT — MIFFLIN-ST. JEOR: SCORE: 1856.62

## 2020-06-20 NOTE — ED PROVIDER NOTES
History     Chief Complaint:  Chest Pain     HPI:  Melinda Bowens is a 34 year old female who presents with chest pain. She was cleaning her garage when 1.5 hours prior to arrival she had the onset of L sided chest pain. Pain was constant, exacerbated by deep respirations, improved with Ibuprofen and time, and is now 3/4 the pain level from before. She voices having no additional associated symptoms. For the last few years she would experience brief seconds of chest pain but did not seek medical attention for it, as the episodes were brief and did not bother her. Denies fever, chills, sweats, fatigue, congestion, rhinorrhea, sore throat, coughing, wheezing, hemoptysis, abdominal pain, nausea, vomiting, diarrhea, leg pain or swelling, exogenous hormone use, history of PE or DVT, long travel, prolonged sedentary state, recent hospitalization or surgery, family history of CAD, personal history of HTN/Diabetes Mellitus/hyperlipidema/CAD.     Pain is still present at this time.    Allergies:  Amoxicillin  Penicillins     Medications:    Bupropion  Imitrex  Topamax  Desyrel  Prilosec  Effexor  Pamelor  Emgality pen    Past Medical History:    Anxiety  Asthma  Kidney calculus  Depression  Endometriosis  Gestational diabetes  Hyperlipidemia  Lichen sclerosus et atrophicus  Migraines  Obesity     Past Surgical History:      Cholecystectomy  Dilation & curettage  Tonsillectomy   Hysterectomy    Family History:    Diabetes  Hyperlipidemia  Hypertension  Heart disease  Osteoporosis  Thyroid disease    Social History:  Smoking status: never  Alcohol use: no  Drug use: no  Patient presents with family.  PCP: Maisha, Mony Tejada    Marital Status:       Review of Systems   Constitutional: Negative for chills, diaphoresis, fatigue and fever.   Respiratory: Negative for cough, chest tightness, shortness of breath and wheezing.    Cardiovascular: Positive for chest pain. Negative for leg swelling.  "  Gastrointestinal: Negative for abdominal pain, nausea and vomiting.   All other systems reviewed and are negative.      Physical Exam     Vitals:  Patient Vitals for the past 24 hrs:   BP Temp Pulse Heart Rate Resp SpO2 Height Weight   06/20/20 1530 101/64 -- 79 76 21 98 % -- --   06/20/20 1345 107/74 -- 84 -- -- 99 % -- --   06/20/20 1322 (!) 146/90 98  F (36.7  C) 86 (!) 16 16 100 % 1.702 m (5' 7\") 113.4 kg (250 lb)       Physical Exam  Vitals signs and nursing note reviewed.   Constitutional:       General: She is not in acute distress.     Appearance: She is not diaphoretic.   HENT:      Head: Normocephalic and atraumatic.      Mouth/Throat:      Pharynx: No oropharyngeal exudate.   Eyes:      General: No scleral icterus.     Extraocular Movements: Extraocular movements intact.   Cardiovascular:      Rate and Rhythm: Normal rate and regular rhythm.      Pulses: Normal pulses.      Heart sounds: Normal heart sounds.   Pulmonary:      Effort: Pulmonary effort is normal. No respiratory distress.      Breath sounds: Normal breath sounds.   Musculoskeletal:         General: No tenderness.      Right lower leg: No edema.      Left lower leg: No edema.   Skin:     General: Skin is warm.      Findings: No rash.   Neurological:      Mental Status: She is alert.     :  Emergency Department Course   ECG (16:29:54):  Rate 81 bpm. CA interval 158. QRS duration 102. QT/QTc 380/441. P-R-T axes 50 25 35. Normal sinus rhythm. Incomplete right bundle branch block. Borderline ECG. Interpreted at 1630 by Dr. Nehemias Sue MD.    Imaging:  XR Chest PA and LAT:   IMPRESSION: No acute airspace infiltrate. as per radiology.    Laboratory:  CBC: WBC 6.9, HGB 13.7,   BMP: WNL (Creatinine 0.79)  1339 Troponin: <0.015  1530 Troponin: <0.015      Emergency Department Course:  Nursing notes and vitals reviewed. (8238) I performed an exam of the patient as documented above.     Blood drawn. This was sent to the lab for further testing, " results above.     The patient was sent for a xray while in the emergency department, findings above.     An EKG was recorded. Results as noted above.     1702 I rechecked the patient and discussed the results of her workup thus far.     Findings and plan explained to the Patient. Patient discharged home with instructions regarding supportive care, medications, and reasons to return. The importance of close follow-up was reviewed.     I personally reviewed the laboratory results with the Patient and answered all related questions prior to discharge.     Impression & Plan   Medical Decision Making:  Melinda Bowens is a 34 year old female who presents to the emergency department today for evaluation of chest pain. Here she voices atypical chest pain that has improved with Ibuprofen administration. She underwent an EKG with no evidence of STEMI, dyshrythmia. Her troponin and delta troponin are without elevation ruling down NSTEMI at this time. HEART score low risk and candidate for outpatient management. She is Wells low risk, PERC negative, and CT imaging does not appear indicated for assessment of pulmonary embolism at this time. PE does clinically appear to be unlikely. Her CXR demonstrates no spontaneous pneumothorax, heart failure or pulmonary edema, rib fracture, pneumonia. Clinically does not appear most c/w aortic dissection, cardiac tamponade Appears a candidate for outpatient management, PCP follow up    Covid-19  Melinda Bowens was evaluated during a global COVID-19 pandemic, which necessitated consideration that the patient might be at risk for infection with the SARS-CoV-2 virus that causes COVID-19.   Applicable protocols for evaluation were followed during the patient's care.     Diagnosis:    ICD-10-CM    1. Chest pain  R07.9 Troponin I        Disposition:  Discharged to home.     Scribe Disclosure:  Gloria VAILES, am serving as a scribe at 1:28 PM on 6/20/2020 to document services  personally performed by Robbi Carrillo PA-C based on my observations and the provider's statements to me.      Scribe Disclosure:  I, Jaron Boggs, am serving as a scribe at 4:34 PM on 6/20/2020 to document services personally performed by Robbi Carrillo PA-C based on my observations and the provider's statements to me.     Gloria Foote   6/20/2020   [facility] EMERGENCY DEPARTMENT     Robbi Carrillo PA-C  06/20/20 1318

## 2020-06-20 NOTE — ED AVS SNAPSHOT
[facility] Emergency Department  [address]  [facility] [address]  Phone:  [phone]  Fax:  [phone]                                    Melinda Bowens   MRN: [id number]    Department:  [facility] Emergency Department   Date of Visit:  6/20/2020           After Visit Summary Signature Page    I have received my discharge instructions, and my questions have been answered. I have discussed any challenges I see with this plan with the nurse or doctor.    ..........................................................................................................................................  Patient/Patient Representative Signature      ..........................................................................................................................................  Patient Representative Print Name and Relationship to Patient    ..................................................               ................................................  Date                                   Time    ..........................................................................................................................................  Reviewed by Signature/Title    ...................................................              ..............................................  Date                                               Time          22EPIC Rev 08/18

## 2020-06-20 NOTE — ED TRIAGE NOTES
Pt c/o chest pain in left chest since 1215.  Sharp pain that is worse with deep inspiration and now radiates into the shoulder.

## 2020-06-21 LAB — INTERPRETATION ECG - MUSE: NORMAL

## 2020-10-16 ENCOUNTER — OFFICE VISIT (OUTPATIENT)
Dept: OBGYN | Facility: CLINIC | Age: 35
End: 2020-10-16
Payer: COMMERCIAL

## 2020-10-16 VITALS
HEIGHT: 67 IN | HEART RATE: 106 BPM | DIASTOLIC BLOOD PRESSURE: 72 MMHG | WEIGHT: 287 LBS | SYSTOLIC BLOOD PRESSURE: 108 MMHG | BODY MASS INDEX: 45.04 KG/M2

## 2020-10-16 DIAGNOSIS — Z01.419 ENCOUNTER FOR GYNECOLOGICAL EXAMINATION WITHOUT ABNORMAL FINDING: Primary | ICD-10-CM

## 2020-10-16 DIAGNOSIS — N90.4 LICHEN SCLEROSUS ET ATROPHICUS OF THE VULVA: ICD-10-CM

## 2020-10-16 DIAGNOSIS — N95.2 VAGINAL ATROPHY: ICD-10-CM

## 2020-10-16 DIAGNOSIS — R87.810 CERVICAL HIGH RISK HPV (HUMAN PAPILLOMAVIRUS) TEST POSITIVE: ICD-10-CM

## 2020-10-16 DIAGNOSIS — E66.01 MORBID OBESITY (H): ICD-10-CM

## 2020-10-16 PROCEDURE — 87624 HPV HI-RISK TYP POOLED RSLT: CPT | Performed by: NURSE PRACTITIONER

## 2020-10-16 PROCEDURE — 99395 PREV VISIT EST AGE 18-39: CPT | Performed by: NURSE PRACTITIONER

## 2020-10-16 PROCEDURE — 88175 CYTOPATH C/V AUTO FLUID REDO: CPT | Performed by: NURSE PRACTITIONER

## 2020-10-16 RX ORDER — ESTRADIOL 0.1 MG/G
1 CREAM VAGINAL AT BEDTIME
Qty: 42.5 G | Refills: 3 | Status: SHIPPED | OUTPATIENT
Start: 2020-10-16 | End: 2022-02-09

## 2020-10-16 RX ORDER — VENLAFAXINE 37.5 MG/1
TABLET ORAL
COMMUNITY
Start: 2020-04-07 | End: 2022-02-09

## 2020-10-16 RX ORDER — CLOBETASOL PROPIONATE 0.5 MG/G
OINTMENT TOPICAL
Qty: 60 G | Refills: 3 | Status: SHIPPED | OUTPATIENT
Start: 2020-10-16 | End: 2022-08-22

## 2020-10-16 RX ORDER — ONDANSETRON 4 MG/1
TABLET, ORALLY DISINTEGRATING ORAL
COMMUNITY
Start: 2019-07-18

## 2020-10-16 RX ORDER — NORTRIPTYLINE HCL 25 MG
75 CAPSULE ORAL
COMMUNITY
Start: 2020-10-09 | End: 2022-02-09

## 2020-10-16 RX ORDER — ALBUTEROL SULFATE 90 UG/1
1-2 AEROSOL, METERED RESPIRATORY (INHALATION)
COMMUNITY
Start: 2020-05-04 | End: 2022-03-31

## 2020-10-16 ASSESSMENT — ANXIETY QUESTIONNAIRES
5. BEING SO RESTLESS THAT IT IS HARD TO SIT STILL: SEVERAL DAYS
1. FEELING NERVOUS, ANXIOUS, OR ON EDGE: MORE THAN HALF THE DAYS
GAD7 TOTAL SCORE: 12
2. NOT BEING ABLE TO STOP OR CONTROL WORRYING: MORE THAN HALF THE DAYS
3. WORRYING TOO MUCH ABOUT DIFFERENT THINGS: MORE THAN HALF THE DAYS
7. FEELING AFRAID AS IF SOMETHING AWFUL MIGHT HAPPEN: SEVERAL DAYS
IF YOU CHECKED OFF ANY PROBLEMS ON THIS QUESTIONNAIRE, HOW DIFFICULT HAVE THESE PROBLEMS MADE IT FOR YOU TO DO YOUR WORK, TAKE CARE OF THINGS AT HOME, OR GET ALONG WITH OTHER PEOPLE: SOMEWHAT DIFFICULT
6. BECOMING EASILY ANNOYED OR IRRITABLE: SEVERAL DAYS

## 2020-10-16 ASSESSMENT — PATIENT HEALTH QUESTIONNAIRE - PHQ9
5. POOR APPETITE OR OVEREATING: NEARLY EVERY DAY
SUM OF ALL RESPONSES TO PHQ QUESTIONS 1-9: 8

## 2020-10-16 ASSESSMENT — MIFFLIN-ST. JEOR: SCORE: 2024.45

## 2020-10-16 NOTE — PROGRESS NOTES
Melinda is a 34 year old  female who presents for annual exam.     Besides routine health maintenance, she has no other health concerns today .    HPI:  The patient's PCP is  [facility].  Patient is here for her annual exam. Her last pap was in 2018 and was CIN1. Will do a pap today.   She has pain with intercourse due to tearing and lichen sclerosis. She uses the cream intermittently when she feels that she needs it. Has been scheduling sex around her vaginal irritation and healing time.   MH addressed by PCP. Sees neurology for migraines.      GYNECOLOGIC HISTORY:    No LMP recorded. Patient has had a hysterectomy.  Her current contraception method is: hysterectomy.  She  reports that she has never smoked. She has never used smokeless tobacco.    Patient is sexually active.  STD testing offered?  Declined  Last PHQ-9 score on record =   PHQ-9 SCORE 10/16/2020   PHQ-9 Total Score 8     Last GAD7 score on record =   BERTIN-7 SCORE 10/16/2020   Total Score 12     Alcohol Score = 1    HEALTH MAINTENANCE:  Cholesterol:   Recent Labs   Lab Test 16  0824 06/28/13 03/15/13   CHOL 192 188 212*   HDL 36* 38* 42*   * 128 151*   TRIG 100 110 95   CHOLHDLRATIO  --   --  5.0     Last Mammo: Not applicable, Result: Not applicable, Next Mammo: Due at age 38   Pap:   Lab Results   Component Value Date    PAP NIL, HPV 16 and other+ 2018    PAP NIL 2013     Colonoscopy:  2009, Result: Normal, Next Colonoscopy: States they said she should have another one.  Dexa:  2012    Health maintenance updated:  yes    HISTORY:  OB History    Para Term  AB Living   2 2 2 0 0 2   SAB TAB Ectopic Multiple Live Births   0 0 0 0 2      # Outcome Date GA Lbr Toni/2nd Weight Sex Delivery Anes PTL Lv   2 Term 08 39w0d  3.629 kg (8 lb) M -SEC   [facility]      Name: Chon   1 Term  40w0d  3.714 kg (8 lb 3 oz) F -SEC   [facility]      Name: Anastasiia       Patient Active Problem  List   Diagnosis     Morbid obesity (H)     Cervical high risk HPV (human papillomavirus) test positive     Lichen sclerosus et atrophicus of the vulva     Past Surgical History:   Procedure Laterality Date      SECTION  ,      CHOLECYSTECTOMY  2008     DILATION AND CURETTAGE  2008, 2009-inactive and secretory endometrium; -secretory endometrium     LAPAROSCOPIC HYSTERECTOMY SUPRACERVICAL, BILATERAL SALPINGO-OOPHORECTOMY, COMBINED  2009    inactive endometrium;bilateral endometriosis in right and left ovaries     LAPAROSCOPY DIAGNOSTIC (GYN)  , ,      TONSILLECTOMY  2010      Social History     Tobacco Use     Smoking status: Never Smoker     Smokeless tobacco: Never Used   Substance Use Topics     Alcohol use: No     Alcohol/week: 0.0 standard drinks      Problem (# of Occurrences) Relation (Name,Age of Onset)    Diabetes (2) Maternal Grandmother, Paternal Grandmother    Heart Disease (2) Maternal Grandmother, Paternal Grandfather    Hyperlipidemia (3) Maternal Grandmother, Mother, Other: Aunt, unspecified    Hypertension (3) Maternal Grandmother, Mother, Other: Aunt, unspecified    Osteoporosis (1) Maternal Grandmother    Thyroid Disease (2) Maternal Grandmother, Mother            Current Outpatient Medications   Medication Sig     buPROPion (WELLBUTRIN XL) 150 MG 24 hr tablet Take 300 mg by mouth     clobetasol (TEMOVATE) 0.05 % external ointment Apply sparingly to affected area twice daily for 2 weeks, then take 2 weeks off and repeat.  Do not apply to face.     estradiol (ESTRACE) 0.1 MG/GM vaginal cream Place 1 g vaginally At Bedtime For 30 nights, then three times per week thereafter. Use finger for application.     galcanezumab-gnlm (EMGALITY) 120 MG/ML injection INJECT 120MG SUBQ MONTHLY     nortriptyline (PAMELOR) 25 MG capsule Take 75 mg by mouth     nystatin (MYCOSTATIN) 639980 UNIT/GM POWD Apply topically 3 times daily as needed To dry  "skin     omeprazole (PRILOSEC) 20 MG DR capsule Take 20 mg by mouth     ondansetron (ZOFRAN-ODT) 4 MG ODT tab      SUMAtriptan Succinate (IMITREX PO) Take 50 mg by mouth every 8 hours as needed.       traZODone (DESYREL) 50 MG tablet 100 mg      venlafaxine (EFFEXOR) 37.5 MG tablet TAKE 2 TABLETS BY MOUTH EVERY DAY     albuterol (PROAIR HFA/PROVENTIL HFA/VENTOLIN HFA) 108 (90 Base) MCG/ACT inhaler Inhale 1-2 puffs into the lungs     No current facility-administered medications for this visit.      Allergies   Allergen Reactions     Amoxicillin Hives     Penicillins        Past medical, surgical, social and family histories were reviewed and updated in EPIC.    ROS:   12 point review of systems negative other than symptoms noted below or in the HPI.  No urinary frequency or dysuria, bladder or kidney problems, POSITIVE for:, vaginal itching or burning    EXAM:  /72   Pulse 106   Ht 1.702 m (5' 7\")   Wt 130.2 kg (287 lb)   Breastfeeding No   BMI 44.95 kg/m     BMI: Body mass index is 44.95 kg/m .    PHYSICAL EXAM:  Constitutional:   Appearance: Well nourished, well developed, alert, in no acute distress  Neck:  Lymph Nodes:  No lymphadenopathy present    Thyroid:  Gland size normal, nontender, no nodules or masses present  on palpation  Chest:  Respiratory Effort:  Breathing unlabored  Cardiovascular:    Heart: Auscultation:  Regular rate, normal rhythm, no murmurs present  Breasts: Inspection of Breasts:  No lymphadenopathy present., Palpation of Breasts and Axillae:  No masses present on palpation, no breast tenderness., Axillary Lymph Nodes:  No lymphadenopathy present. and No nodularity, asymmetry or nipple discharge bilaterally.  Gastrointestinal:   Abdominal Examination:  Abdomen nontender to palpation, tone normal without rigidity or guarding, no masses present, umbilicus without lesions   Liver and Spleen:  No hepatomegaly present, liver nontender to palpation    Hernias:  No hernias " present  Lymphatic: Lymph Nodes:  No other lymphadenopathy present  Skin:  General Inspection:  No rashes present, no lesions present, no areas of  discoloration  Neurologic:    Mental Status:  Oriented X3.  Normal strength and tone, sensory exam                grossly normal, mentation intact and speech normal.    Psychiatric:   Mentation appears normal and affect normal/bright.         Pelvic Exam:  External Genitalia:     No discharge present, no tenderness present, no inflammatory lesions present, WHITE EPITHELIUM BILATERAL LABIA MINORA LOWER 2/3  Vagina:     Normal vaginal vault without central or paravaginal defects, no discharge present, no inflammatory lesions present, no masses present, ATROPHIC TISSUE  Bladder:     Nontender to palpation  Urethra:   Urethral Body:  Urethra palpation normal, urethra structural support normal   Urethral Meatus:  No erythema or lesions present  Cervix:    Appearance healthy, no lesions present, nontender to palpation, no bleeding present  Uterus:     Surgically absent  Adnexa:     Surgically absent  Perineum:     Perineum within normal limits, no evidence of trauma, no rashes or skin lesions present  Anus:     Anus within normal limits, no hemorrhoids present  Inguinal Lymph Nodes:     No lymphadenopathy present  Pubic Hair:     Normal pubic hair distribution for age  Genitalia and Groin:     No rashes present, no lesions present, no areas of discoloration, no masses present      COUNSELING:   Reviewed preventive health counseling, as reflected in patient instructions  Special attention given to:        (Violetta)menopause management       LSEA and vag atrophy    BMI: Body mass index is 44.95 kg/m .  Weight management plan: Discussed healthy diet and exercise guidelines    ASSESSMENT:  34 year old female with satisfactory annual exam.    ICD-10-CM    1. Encounter for gynecological examination without abnormal finding  Z01.419 Pap imaged thin layer screen with HPV - recommended  age 30 - 65     HPV High Risk Types DNA Cervical   2. Lichen sclerosus et atrophicus of the vulva  N90.4 clobetasol (TEMOVATE) 0.05 % external ointment   3. Cervical high risk HPV (human papillomavirus) test positive  R87.810    4. Morbid obesity (H)  E66.01    5. Vaginal atrophy  N95.2 estradiol (ESTRACE) 0.1 MG/GM vaginal cream       PLAN:  Last pap was CIN1 in 2018. Did pap today. Will contact with result. Will start on vaginal E2 for vaginal atrophy. Educated on importance of using clobetasol to help with pain and and prevent tearing.     MARYANNE Camarillo CNP

## 2020-10-17 ASSESSMENT — ANXIETY QUESTIONNAIRES: GAD7 TOTAL SCORE: 12

## 2020-10-20 LAB
COPATH REPORT: NORMAL
PAP: NORMAL

## 2020-10-22 ENCOUNTER — PATIENT OUTREACH (OUTPATIENT)
Dept: OBGYN | Facility: CLINIC | Age: 35
End: 2020-10-22

## 2020-10-22 DIAGNOSIS — R87.810 CERVICAL HIGH RISK HPV (HUMAN PAPILLOMAVIRUS) TEST POSITIVE: ICD-10-CM

## 2020-11-10 ENCOUNTER — OFFICE VISIT (OUTPATIENT)
Dept: OBGYN | Facility: CLINIC | Age: 35
End: 2020-11-10
Payer: COMMERCIAL

## 2020-11-10 VITALS
WEIGHT: 293 LBS | SYSTOLIC BLOOD PRESSURE: 112 MMHG | BODY MASS INDEX: 45.99 KG/M2 | DIASTOLIC BLOOD PRESSURE: 70 MMHG | HEIGHT: 67 IN

## 2020-11-10 DIAGNOSIS — L90.0 LICHEN SCLEROSUS ET ATROPHICUS: ICD-10-CM

## 2020-11-10 DIAGNOSIS — N95.2 VAGINAL ATROPHY: ICD-10-CM

## 2020-11-10 DIAGNOSIS — R87.810 CERVICAL HIGH RISK HPV (HUMAN PAPILLOMAVIRUS) TEST POSITIVE: Primary | ICD-10-CM

## 2020-11-10 DIAGNOSIS — N76.0 VAGINITIS AND VULVOVAGINITIS: ICD-10-CM

## 2020-11-10 PROCEDURE — 99213 OFFICE O/P EST LOW 20 MIN: CPT | Performed by: OBSTETRICS & GYNECOLOGY

## 2020-11-10 RX ORDER — BETAMETHASONE DIPROPIONATE 0.5 MG/G
OINTMENT, AUGMENTED TOPICAL 2 TIMES DAILY
Qty: 45 G | Refills: 0 | Status: SHIPPED | OUTPATIENT
Start: 2020-11-10 | End: 2022-08-23

## 2020-11-10 RX ORDER — METRONIDAZOLE 500 MG/1
500 TABLET ORAL 2 TIMES DAILY
Qty: 14 TABLET | Refills: 0 | Status: SHIPPED | OUTPATIENT
Start: 2020-11-10 | End: 2020-11-17

## 2020-11-10 RX ORDER — ESTRADIOL 10 UG/1
10 INSERT VAGINAL
Qty: 8 TABLET | Refills: 11 | Status: SHIPPED | OUTPATIENT
Start: 2020-11-12 | End: 2022-02-09 | Stop reason: ALTCHOICE

## 2020-11-10 ASSESSMENT — MIFFLIN-ST. JEOR: SCORE: 2067.53

## 2020-11-10 NOTE — PROGRESS NOTES
INDICATIONS:                                                    Is a pregnancy test required: No.  Was a consent obtained?  Yes      Melinda Bowens, is a 35 year old female, who had a recent NILM pap.  HPV 16 positive,other HPV Yes prior history of abnormal pap. Here today for colposcopy. Discussed indication, risks of infection and bleeding.    Her last pap was   Lab Results   Component Value Date    PAP NIL 10/16/2020    .    PROCEDURE:                                                      Lichen sclerosus changes on the vulva. Small plastic speculum used. Intolerant of the acetic acid. Procedure stopped.     POST PROCEDURE:                                                      I recommend giving flagyl for one week with consistent external clobetasol and internal vaginal estrogen. Betamethasone and vaginal estrogen tablets prescribed due to insurance coverage.   We also discussed having a LEEP for persistent cervical dysplasia or a trachelectomy.    Hilary Salazar MD

## 2020-12-24 ENCOUNTER — PATIENT OUTREACH (OUTPATIENT)
Dept: OBGYN | Facility: CLINIC | Age: 35
End: 2020-12-24

## 2021-01-04 NOTE — PROGRESS NOTES
INDICATIONS:                                                    Is a pregnancy test required: No.  Was a consent obtained?  Yes    Melinda Bowens, is a 35 year old female, who had a recent NIL pap.  HPV 16 positive Yes prior history of abnormal pap with CARLINE 1 in 2018. Here today for colposcopy. She had not been able to tolerate the colposcopy at her last visit. She was given vaginal estrogen and external betamethasone cream for the lichen sclerosus and atrophic vaginitis. She has had a supracervical hysterectomy and bilateral salpingo-oophorectomy due to endometriosis.   Discussed indication, risks of infection and bleeding.    Her last pap was   Lab Results   Component Value Date    PAP NIL 10/16/2020    .    PROCEDURE:                                                      Cervix is stained with acetic acid and viewed colposcopically. Squamocolumnar junction is not visualized in it's entirety. no visible lesions . Biopsy done No. Endocervical curretage Done         POST PROCEDURE:                                                      IMPRESSION: colposcopy adequate, HPV and Normal cervix    PLAN : Await the results of the biopsies.  She tolerated the procedure well. There were no complications. Patient was discharged in stable condition.    Patient advised to call the clinic if excessive bleeding, pelvic pain, or fever.     Follow-up based on pathology results.  Hilary Salazar MD

## 2021-01-05 ENCOUNTER — OFFICE VISIT (OUTPATIENT)
Dept: OBGYN | Facility: CLINIC | Age: 36
End: 2021-01-05
Payer: COMMERCIAL

## 2021-01-05 VITALS
DIASTOLIC BLOOD PRESSURE: 70 MMHG | WEIGHT: 293 LBS | SYSTOLIC BLOOD PRESSURE: 106 MMHG | HEIGHT: 67 IN | BODY MASS INDEX: 45.99 KG/M2

## 2021-01-05 DIAGNOSIS — R87.810 CERVICAL HIGH RISK HPV (HUMAN PAPILLOMAVIRUS) TEST POSITIVE: Primary | ICD-10-CM

## 2021-01-05 PROCEDURE — 88305 TISSUE EXAM BY PATHOLOGIST: CPT | Performed by: PATHOLOGY

## 2021-01-05 PROCEDURE — 57456 ENDOCERV CURETTAGE W/SCOPE: CPT | Performed by: OBSTETRICS & GYNECOLOGY

## 2021-01-05 ASSESSMENT — MIFFLIN-ST. JEOR: SCORE: 2078.42

## 2021-01-07 LAB — COPATH REPORT: NORMAL

## 2021-01-12 PROBLEM — R87.810 CERVICAL HIGH RISK HPV (HUMAN PAPILLOMAVIRUS) TEST POSITIVE: Status: ACTIVE | Noted: 2018-07-26

## 2021-09-18 ENCOUNTER — HEALTH MAINTENANCE LETTER (OUTPATIENT)
Age: 36
End: 2021-09-18

## 2021-10-26 ENCOUNTER — HOSPITAL ENCOUNTER (EMERGENCY)
Facility: CLINIC | Age: 36
Discharge: HOME | End: 2021-10-27
Attending: EMERGENCY MEDICINE | Admitting: EMERGENCY MEDICINE
Payer: COMMERCIAL

## 2021-10-26 DIAGNOSIS — R51.9 NONINTRACTABLE EPISODIC HEADACHE, UNSPECIFIED HEADACHE TYPE: ICD-10-CM

## 2021-10-26 PROCEDURE — 250N000011 HC RX IP 250 OP 636: Performed by: EMERGENCY MEDICINE

## 2021-10-26 PROCEDURE — 96365 THER/PROPH/DIAG IV INF INIT: CPT

## 2021-10-26 PROCEDURE — 99284 EMERGENCY DEPT VISIT MOD MDM: CPT | Mod: 25

## 2021-10-26 PROCEDURE — 258N000003 HC RX IP 258 OP 636: Performed by: EMERGENCY MEDICINE

## 2021-10-26 PROCEDURE — 96376 TX/PRO/DX INJ SAME DRUG ADON: CPT

## 2021-10-26 PROCEDURE — 96375 TX/PRO/DX INJ NEW DRUG ADDON: CPT

## 2021-10-26 RX ORDER — SODIUM CHLORIDE 9 MG/ML
1000 INJECTION, SOLUTION INTRAVENOUS CONTINUOUS
Status: DISCONTINUED | OUTPATIENT
Start: 2021-10-26 | End: 2021-10-27 | Stop reason: HOSPADM

## 2021-10-26 RX ORDER — METOCLOPRAMIDE HYDROCHLORIDE 5 MG/ML
10 INJECTION INTRAMUSCULAR; INTRAVENOUS ONCE
Status: COMPLETED | OUTPATIENT
Start: 2021-10-26 | End: 2021-10-26

## 2021-10-26 RX ORDER — DEXAMETHASONE SODIUM PHOSPHATE 10 MG/ML
10 INJECTION, SOLUTION INTRAMUSCULAR; INTRAVENOUS ONCE
Status: COMPLETED | OUTPATIENT
Start: 2021-10-26 | End: 2021-10-26

## 2021-10-26 RX ORDER — KETOROLAC TROMETHAMINE 15 MG/ML
15 INJECTION, SOLUTION INTRAMUSCULAR; INTRAVENOUS ONCE
Status: COMPLETED | OUTPATIENT
Start: 2021-10-26 | End: 2021-10-26

## 2021-10-26 RX ORDER — MAGNESIUM SULFATE 1 G/100ML
1 INJECTION INTRAVENOUS ONCE
Status: COMPLETED | OUTPATIENT
Start: 2021-10-26 | End: 2021-10-27

## 2021-10-26 RX ORDER — RIZATRIPTAN BENZOATE 10 MG/1
10 TABLET ORAL
COMMUNITY
End: 2022-02-09

## 2021-10-26 RX ORDER — DIPHENHYDRAMINE HYDROCHLORIDE 50 MG/ML
25 INJECTION INTRAMUSCULAR; INTRAVENOUS ONCE
Status: COMPLETED | OUTPATIENT
Start: 2021-10-26 | End: 2021-10-26

## 2021-10-26 RX ADMIN — METOCLOPRAMIDE HYDROCHLORIDE 10 MG: 5 INJECTION INTRAMUSCULAR; INTRAVENOUS at 22:41

## 2021-10-26 RX ADMIN — DEXAMETHASONE SODIUM PHOSPHATE 10 MG: 10 INJECTION, SOLUTION INTRAMUSCULAR; INTRAVENOUS at 22:41

## 2021-10-26 RX ADMIN — KETOROLAC TROMETHAMINE 15 MG: 15 INJECTION, SOLUTION INTRAMUSCULAR; INTRAVENOUS at 22:41

## 2021-10-26 RX ADMIN — SODIUM CHLORIDE 1000 ML: 9 INJECTION, SOLUTION INTRAVENOUS at 23:26

## 2021-10-26 RX ADMIN — DIPHENHYDRAMINE HYDROCHLORIDE 25 MG: 50 INJECTION, SOLUTION INTRAMUSCULAR; INTRAVENOUS at 22:40

## 2021-10-26 RX ADMIN — MAGNESIUM SULFATE HEPTAHYDRATE 1 G: 1 INJECTION, SOLUTION INTRAVENOUS at 23:22

## 2021-10-27 VITALS
TEMPERATURE: 97.9 F | RESPIRATION RATE: 20 BRPM | SYSTOLIC BLOOD PRESSURE: 125 MMHG | OXYGEN SATURATION: 97 % | HEART RATE: 82 BPM | DIASTOLIC BLOOD PRESSURE: 80 MMHG

## 2021-10-27 PROCEDURE — 250N000011 HC RX IP 250 OP 636: Performed by: EMERGENCY MEDICINE

## 2021-10-27 RX ORDER — ONDANSETRON 2 MG/ML
4 INJECTION INTRAMUSCULAR; INTRAVENOUS ONCE
Status: COMPLETED | OUTPATIENT
Start: 2021-10-27 | End: 2021-10-27

## 2021-10-27 RX ADMIN — ONDANSETRON 4 MG: 2 INJECTION INTRAMUSCULAR; INTRAVENOUS at 01:10

## 2021-10-27 ASSESSMENT — ENCOUNTER SYMPTOMS
DYSURIA: 0
HEADACHES: 1
DIFFICULTY URINATING: 0
NAUSEA: 1
VOMITING: 0
COUGH: 0
ABDOMINAL PAIN: 0
FEVER: 0
NECK PAIN: 1
NUMBNESS: 1

## 2021-10-27 NOTE — ED NOTES
Headache started on Sunday night same locations, gets 9-10 headache a month of migraines , I get a headache every day. This one wont go away.

## 2021-10-27 NOTE — ED PROVIDER NOTES
History   Chief Complaint:  Headache       The history is provided by the patient.      Melinda Bowens is a 36 year old female with history of obesity, gestational diabetes, migraines who presents with headache. The patient reports that she has been experiencing a headache for about 2 days. Her pain is localized to the back of her head and neck which she states is typical for her migraines.. She is also experiencing nausea and right-sided facial numbness. The patient notes that these symptoms are not unusual for her past migraines, but that her headache today will not resolve with her usual medications. She also reports that medications in the emergency department have not been helpful. Her regular neurologist is at [facility]. The patient reports no known Covid-19 exposures, although she previously had Covid-19 about a year ago. She was vaccinated for Covid-19 about 6 months ago. At this time, the patient denies fever or vomiting. She also reports no cough, abdominal pain, or urinary symptoms.  She states that she is past due for her Botox which generally helps control her migraines due to an unpaid bill.    Review of Systems   Constitutional: Negative for fever.   Respiratory: Negative for cough.    Gastrointestinal: Positive for nausea. Negative for abdominal pain and vomiting.   Genitourinary: Negative for difficulty urinating and dysuria.   Musculoskeletal: Positive for neck pain.   Neurological: Positive for numbness (R face) and headaches.   All other systems reviewed and are negative.        Allergies:  Amoxicillin  Penicillins    Medications:  Albuterol  Omeprazole  Bupropion  Venlafaxine  Trazodone  Nortriptyline  Emgality  Rizatriptan  Zofran    Past Medical History:     GERD  IBS  Lichen sclerosus et atrophicus of the vulva  Obesity  Migraines  Depression  Anxiety  Asthma  Kidney stones  Positive HPV test  Pelvic peritoneum endometriosis  Gestational diabetes  Hyperlipidemia      Past Surgical  History:      Cholecystectomy  Dilation and curettage x3  Hysterectomy and bilateral salpingo-oophorectomy  Diagnostic laparoscopy  Tonsillectomy     Family History:    Mother: Thyroid disease, hypertension, hyperlipidemia  Brother: Depression    Social History:  Arrives to the emergency department with her daughter  Is     Physical Exam     Patient Vitals for the past 24 hrs:   BP Temp Temp src Pulse Resp SpO2   10/26/21 2209 (!) 138/90 97.9  F (36.6  C) Oral 91 20 97 %       Physical Exam  General: Lying with her face covered by her jacket.  HENT:    Mouth/Throat: Oral mucosa moist.    Eyes: Conjunctivae are normal. No scleral icterus.  Neck: No meningismus.  Cardiovascular: Normal rate, regular rhythm and intact distal pulses.    Pulmonary/Chest: Effort normal and breath sounds normal.   Abdominal: Soft.  No distension. There is no tenderness.   Musculoskeletal:  No edema, No calf tenderness  Neurological:Alert and oriented.  Cranial nerves II through XII intact with the exception of her report of altered sensation to the right face which is typical for her.  Upper and lower extremity strength and light touch sensation intact.. Coordination normal.   Skin: Skin is warm and dry.   Psychiatric: Normal mood and affect.       Emergency Department Course     Emergency Department Course:  Reviewed:  I reviewed nursing notes, vitals, past medical history and Care Everywhere    Assessments:  230 I obtained history and examined the patient as noted above.   0046 I rechecked the patient, who was asleep when I walked in and awoke to voice. The patient is able to open her eyes at this time.  Appears much more comfortable and is tolerating ambient light in the room.  I performed a cranial nerve exam.  She is tolerating p.o.  Ambulated to the bathroom.  Feels ready for discharge.    Interventions:  2241 Toradol 15mg  2241 Reglan 10mg  2241 Decadron 10mg IV  2241 Benadryl 25mg IV  2322 Magnesium sulfate  infusion 1g IV  2326 NS bolus 1L IV  0110 Zofran 4mg IV    Disposition:  The patient was discharged to home.     Impression & Plan     CMS Diagnoses: None    Medical Decision Making:  Melinda Bowens is a 36 year old female who presents with report of the headache which is consistent with her migraines but has not been improving with typical migraine treatment at home.  She states that this has happened in the past and she is typically seen at [facility].  She believes the worsening migraine is related to missing Botox injections.  Headache does not include concerning features such as a fever or acute onset.  She did have facial paresthesias which are normal for her with her migraines but otherwise is neurologically intact.  She improved with the combination of medications prescribed above.  Recommended returning to the emergency department with new worsening or inadequately controlled symptoms and otherwise arranging for close follow-up with primary care and neurology for ongoing evaluation and management of her headaches.      Diagnosis:    ICD-10-CM    1. Nonintractable episodic headache, unspecified headache type  R51.9        Scribe Disclosure:  I, Janusz Galeana, am serving as a scribe at 11:11 PM on 10/26/2021 to document services personally performed by Fatou Gordon MD based on my observations and the provider's statements to me.              Fatou Gordon MD  10/27/21 5533

## 2021-10-27 NOTE — ED TRIAGE NOTES
Patient presents with migraine. Onset Sunday. Hx chronic migraines. Taking prescribed migraine medications at home without relief. Sensitive to lights and sounds. Took Imitrex and zofran [id number].

## 2021-12-20 ENCOUNTER — PATIENT OUTREACH (OUTPATIENT)
Dept: OBGYN | Facility: CLINIC | Age: 36
End: 2021-12-20
Payer: COMMERCIAL

## 2022-01-08 ENCOUNTER — HEALTH MAINTENANCE LETTER (OUTPATIENT)
Age: 37
End: 2022-01-08

## 2022-02-09 ENCOUNTER — OFFICE VISIT (OUTPATIENT)
Dept: OBGYN | Facility: CLINIC | Age: 37
End: 2022-02-09
Payer: COMMERCIAL

## 2022-02-09 VITALS
SYSTOLIC BLOOD PRESSURE: 122 MMHG | DIASTOLIC BLOOD PRESSURE: 60 MMHG | BODY MASS INDEX: 45.99 KG/M2 | HEIGHT: 67 IN | WEIGHT: 293 LBS

## 2022-02-09 DIAGNOSIS — R87.810 CERVICAL HIGH RISK HPV (HUMAN PAPILLOMAVIRUS) TEST POSITIVE: ICD-10-CM

## 2022-02-09 DIAGNOSIS — N95.2 VAGINAL ATROPHY: ICD-10-CM

## 2022-02-09 DIAGNOSIS — Z01.419 ENCOUNTER FOR GYNECOLOGICAL EXAMINATION WITHOUT ABNORMAL FINDING: Primary | ICD-10-CM

## 2022-02-09 PROBLEM — Z87.19 HISTORY OF GASTROESOPHAGEAL REFLUX (GERD): Status: ACTIVE | Noted: 2019-03-01

## 2022-02-09 PROBLEM — K58.9 IBS (IRRITABLE BOWEL SYNDROME): Status: ACTIVE | Noted: 2019-01-18

## 2022-02-09 PROCEDURE — 99395 PREV VISIT EST AGE 18-39: CPT | Performed by: NURSE PRACTITIONER

## 2022-02-09 PROCEDURE — 87624 HPV HI-RISK TYP POOLED RSLT: CPT | Performed by: NURSE PRACTITIONER

## 2022-02-09 PROCEDURE — G0145 SCR C/V CYTO,THINLAYER,RESCR: HCPCS | Performed by: NURSE PRACTITIONER

## 2022-02-09 RX ORDER — CEPHALEXIN 500 MG/1
CAPSULE ORAL
COMMUNITY
Start: 2021-06-25 | End: 2022-02-09

## 2022-02-09 RX ORDER — NEOMYCIN SULFATE, POLYMYXIN B SULFATE AND HYDROCORTISONE 10; 3.5; 1 MG/ML; MG/ML; [USP'U]/ML
SUSPENSION/ DROPS AURICULAR (OTIC)
COMMUNITY
Start: 2021-08-28 | End: 2022-02-09

## 2022-02-09 RX ORDER — ESTRADIOL 0.1 MG/G
1 CREAM VAGINAL AT BEDTIME
Qty: 42.5 G | Refills: 3 | Status: SHIPPED | OUTPATIENT
Start: 2022-02-09 | End: 2022-02-23

## 2022-02-09 RX ORDER — CLINDAMYCIN HCL 300 MG
CAPSULE ORAL
COMMUNITY
Start: 2021-11-03 | End: 2022-02-09

## 2022-02-09 RX ORDER — BUPROPION HYDROCHLORIDE 300 MG/1
TABLET ORAL
COMMUNITY
Start: 2021-12-19

## 2022-02-09 RX ORDER — RIZATRIPTAN BENZOATE 10 MG/1
TABLET ORAL
COMMUNITY
Start: 2021-08-20

## 2022-02-09 RX ORDER — TIZANIDINE 2 MG/1
TABLET ORAL
COMMUNITY
Start: 2021-08-28 | End: 2022-02-09

## 2022-02-09 RX ORDER — TRAZODONE HYDROCHLORIDE 100 MG/1
100 TABLET ORAL AT BEDTIME
COMMUNITY
Start: 2021-12-23

## 2022-02-09 RX ORDER — SUMATRIPTAN 100 MG/1
TABLET, FILM COATED ORAL
COMMUNITY
Start: 2021-02-26 | End: 2022-02-09

## 2022-02-09 RX ORDER — NORTRIPTYLINE HYDROCHLORIDE 75 MG/1
CAPSULE ORAL
COMMUNITY
Start: 2021-10-26

## 2022-02-09 RX ORDER — VENLAFAXINE 75 MG/1
TABLET ORAL
COMMUNITY
Start: 2021-12-23

## 2022-02-09 RX ORDER — OMEPRAZOLE 40 MG/1
CAPSULE, DELAYED RELEASE ORAL
COMMUNITY
Start: 2021-11-15 | End: 2022-02-09

## 2022-02-09 RX ORDER — OMEPRAZOLE 40 MG/1
40 CAPSULE, DELAYED RELEASE ORAL
COMMUNITY
Start: 2021-11-15

## 2022-02-09 ASSESSMENT — ANXIETY QUESTIONNAIRES
7. FEELING AFRAID AS IF SOMETHING AWFUL MIGHT HAPPEN: NEARLY EVERY DAY
3. WORRYING TOO MUCH ABOUT DIFFERENT THINGS: NEARLY EVERY DAY
GAD7 TOTAL SCORE: 15
6. BECOMING EASILY ANNOYED OR IRRITABLE: SEVERAL DAYS
IF YOU CHECKED OFF ANY PROBLEMS ON THIS QUESTIONNAIRE, HOW DIFFICULT HAVE THESE PROBLEMS MADE IT FOR YOU TO DO YOUR WORK, TAKE CARE OF THINGS AT HOME, OR GET ALONG WITH OTHER PEOPLE: VERY DIFFICULT
5. BEING SO RESTLESS THAT IT IS HARD TO SIT STILL: NOT AT ALL
1. FEELING NERVOUS, ANXIOUS, OR ON EDGE: MORE THAN HALF THE DAYS
2. NOT BEING ABLE TO STOP OR CONTROL WORRYING: NEARLY EVERY DAY

## 2022-02-09 ASSESSMENT — PATIENT HEALTH QUESTIONNAIRE - PHQ9
5. POOR APPETITE OR OVEREATING: NEARLY EVERY DAY
SUM OF ALL RESPONSES TO PHQ QUESTIONS 1-9: 16

## 2022-02-09 ASSESSMENT — MIFFLIN-ST. JEOR: SCORE: 2121.96

## 2022-02-09 NOTE — PROGRESS NOTES
Melinda is a 36 year old  female who presents for annual exam.     Pap, pelvic, and breast exam.     HPI:  The patient's PCP is [facility].  Patient here today for her annual GYN exam and Pap smear.  She has a past history of endometriosis and had a lash BSO in .  She is currently not on hormone replacement therapy.  She does complain of dyspareunia at the level of the vagina despite using sexual lubricants.    She does have a history of abnormal Pap smears with negative colposcopies.  We will repeat today.      GYNECOLOGIC HISTORY:    No LMP recorded. Patient has had a hysterectomy.    Regular menses? No    Her current contraception method is: hysterectomy.  She  reports that she has never smoked. She has never used smokeless tobacco.  Patient is sexually active.  STD testing offered?  Declined     Last PHQ-9 score on record =   PHQ-9 SCORE 2022   PHQ-9 Total Score 16     Last GAD7 score on record =   BERTIN-7 SCORE 2022   Total Score 15     Alcohol Score =     HEALTH MAINTENANCE:\    Cholesterol:   Cholesterol   Date Value Ref Range Status   2016 192 <200 mg/dL Final   2013 188 125 - 200 mg/dL Final      Last Mammo: Not applicable, Result: Not applicable, Next Mammo: Due at age 38     Pap:   Lab Results   Component Value Date    PAP NIL 10/16/2020    PAP NIL 2018    PAP NIL 2013      Colonoscopy: N/A, Result: Not applicable  Dexa:      Health maintenance updated:  yes    HISTORY:  OB History    Para Term  AB Living   2 2 2 0 0 2   SAB IAB Ectopic Multiple Live Births   0 0 0 0 2      # Outcome Date GA Lbr Toni/2nd Weight Sex Delivery Anes PTL Lv   2 Term / 39w0d  3.629 kg (8 lb) M -SEC   [facility]      Name: Chon   1 Term  40w0d  3.714 kg (8 lb 3 oz) F -SEC   [facility]      Name: Anastasiia       Patient Active Problem List   Diagnosis     Morbid obesity (H)     Cervical high risk HPV (human papillomavirus) test positive     Lichen sclerosus  et atrophicus of the vulva     Common migraine     Depression with anxiety     History of gastroesophageal reflux (GERD)     IBS (irritable bowel syndrome)     Seasonal allergies     Past Surgical History:   Procedure Laterality Date      SECTION  ,      CHOLECYSTECTOMY  2008     DILATION AND CURETTAGE  2008, 2009-inactive and secretory endometrium; -secretory endometrium     LAPAROSCOPIC HYSTERECTOMY SUPRACERVICAL, BILATERAL SALPINGO-OOPHORECTOMY, COMBINED  2009    inactive endometrium;bilateral endometriosis in right and left ovaries     LAPAROSCOPY DIAGNOSTIC (GYN)  , ,      TONSILLECTOMY  2010      Social History     Tobacco Use     Smoking status: Never Smoker     Smokeless tobacco: Never Used   Substance Use Topics     Alcohol use: No     Alcohol/week: 0.0 standard drinks      Problem (# of Occurrences) Relation (Name,Age of Onset)    Diabetes (2) Maternal Grandmother, Paternal Grandmother    Heart Disease (2) Maternal Grandmother, Paternal Grandfather    Hyperlipidemia (3) Maternal Grandmother, Mother, Other: Aunt, unspecified    Hypertension (3) Maternal Grandmother, Mother, Other: Aunt, unspecified    Osteoporosis (1) Maternal Grandmother    Thyroid Disease (2) Maternal Grandmother, Mother            Current Outpatient Medications   Medication Sig     augmented betamethasone dipropionate (DIPROLENE-AF) 0.05 % external ointment Apply topically 2 times daily Use for 2 weeks then twice a week thereafter consistently     buPROPion (WELLBUTRIN XL) 300 MG 24 hr tablet TAKE 1 TABLET (300 MG) BY MOUTH ONCE DAILY.     clobetasol (TEMOVATE) 0.05 % external ointment Apply sparingly to affected area twice daily for 2 weeks, then take 2 weeks off and repeat.  Do not apply to face.     estradiol (ESTRACE) 0.1 MG/GM vaginal cream Place 1 g vaginally At Bedtime For 30 nights, then three times per week thereafter. Use finger for application.      "nortriptyline (PAMELOR) 75 MG capsule TAKE 1 CAPSULE BY MOUTH EVERYDAY AT BEDTIME     omeprazole (PRILOSEC) 40 MG DR capsule Take 40 mg by mouth     ondansetron (ZOFRAN-ODT) 4 MG ODT tab      rizatriptan (MAXALT) 10 MG tablet TAKE 1 TAB BY MOUTH AT ONSET OF MIGRAINE.MAY REPEAT IN 2 HOURS.MAX 2TABS/24HR     traZODone (DESYREL) 100 MG tablet Take 100 mg by mouth At Bedtime     venlafaxine (EFFEXOR) 75 MG tablet TAKE 1 TABLET BY MOUTH TWICE A DAY     albuterol (PROAIR HFA/PROVENTIL HFA/VENTOLIN HFA) 108 (90 Base) MCG/ACT inhaler Inhale 1-2 puffs into the lungs (Patient not taking: Reported on 2/9/2022)     No current facility-administered medications for this visit.     Allergies   Allergen Reactions     Amoxicillin Hives     Penicillins Hives       Past medical, surgical, social and family histories were reviewed and updated in EPIC.    ROS:   12 point review of systems negative other than symptoms noted below or in the HPI.  No urinary frequency or dysuria, bladder or kidney problems    EXAM:  /60 (BP Location: Right arm, Patient Position: Sitting, Cuff Size: Adult Regular)   Ht 1.702 m (5' 7\")   Wt 140.9 kg (310 lb 11.2 oz)   Breastfeeding No   BMI 48.66 kg/m     BMI: Body mass index is 48.66 kg/m .    PHYSICAL EXAM:  Constitutional:   Appearance: Well nourished, well developed, alert, in no acute distress  Neck:  Lymph Nodes:  No lymphadenopathy present    Thyroid:  Gland size normal, nontender, no nodules or masses present  on palpation  Chest:  Respiratory Effort:  Breathing unlabored  Cardiovascular:    Heart: Auscultation:  Regular rate, normal rhythm, no murmurs present  Breasts: Inspection of Breasts:  No lymphadenopathy present., Palpation of Breasts and Axillae:  No masses present on palpation, no breast tenderness., Axillary Lymph Nodes:  No lymphadenopathy present., No nodularity, asymmetry or nipple discharge bilaterally. and Tenderness on the right lateral aspect  Gastrointestinal:   Abdominal " Examination:  Abdomen nontender to palpation, tone normal without rigidity or guarding, no masses present, umbilicus without lesions   Liver and Spleen:  No hepatomegaly present, liver nontender to palpation    Hernias:  No hernias present  Lymphatic: Lymph Nodes:  No other lymphadenopathy present  Skin:  General Inspection:  No rashes present, no lesions present, no areas of  discoloration  Neurologic:    Mental Status:  Oriented X3.  Normal strength and tone, sensory exam                grossly normal, mentation intact and speech normal.    Psychiatric:   Mentation appears normal and affect normal/bright.         Pelvic Exam:  External Genitalia:     Normal appearance for age, no discharge present, no tenderness present, no inflammatory lesions present, color normal.  Loss of architecture.  There is some adherence of the labia minora on the right upper one third.  Vagina:     Normal vaginal vault without central or paravaginal defects, no discharge present, no inflammatory lesions present, no masses present  Bladder:     Nontender to palpation  Urethra:   Urethral Body:  Urethra palpation normal, urethra structural support normal   Urethral Meatus:  No erythema or lesions present  Cervix:    Appearance healthy, no lesions present, nontender to palpation, no bleeding present  Uterus:     Surgically absent  Adnexa:     Surgically absent  Perineum:     Perineum within normal limits, no evidence of trauma, no rashes or skin lesions present  Anus:     Anus within normal limits, no hemorrhoids present  Inguinal Lymph Nodes:     No lymphadenopathy present  Pubic Hair:     Normal pubic hair distribution for age  Genitalia and Groin:     No rashes present, no lesions present, no areas of discoloration, no masses present      COUNSELING:   Special attention given to:        Regular exercise       Healthy diet/nutrition       (Violetta)menopause management    BMI: Body mass index is 48.66 kg/m .    ASSESSMENT:  36 year old  female with satisfactory annual exam.    ICD-10-CM    1. Encounter for gynecological examination without abnormal finding  Z01.419    2. Cervical high risk HPV (human papillomavirus) test positive  R87.810 Pap screen with HPV - recommended age 30 - 65 years   3. Vaginal atrophy  N95.2 estradiol (ESTRACE) 0.1 MG/GM vaginal cream       PLAN:  36-year-old surgically menopausal female with dyspareunia.  We have asked her to reinitiate vaginal estrogen therapy.  We like to see her back in 3 months.  Her Pap smear was updated and if it is normal she can repeat in 1 year.    MARYANNE Camarillo CNP

## 2022-02-10 ASSESSMENT — ANXIETY QUESTIONNAIRES: GAD7 TOTAL SCORE: 15

## 2022-02-11 LAB
BKR LAB AP GYN ADEQUACY: NORMAL
BKR LAB AP GYN INTERPRETATION: NORMAL
BKR LAB AP HPV REFLEX: NORMAL
BKR LAB AP PREVIOUS ABNL DX: NORMAL
BKR LAB AP PREVIOUS ABNORMAL: NORMAL
PATH REPORT.COMMENTS IMP SPEC: NORMAL
PATH REPORT.COMMENTS IMP SPEC: NORMAL
PATH REPORT.RELEVANT HX SPEC: NORMAL

## 2022-02-15 LAB
HUMAN PAPILLOMA VIRUS 16 DNA: POSITIVE
HUMAN PAPILLOMA VIRUS 18 DNA: NEGATIVE
HUMAN PAPILLOMA VIRUS FINAL DIAGNOSIS: ABNORMAL
HUMAN PAPILLOMA VIRUS OTHER HR: POSITIVE

## 2022-02-16 ENCOUNTER — PATIENT OUTREACH (OUTPATIENT)
Dept: OBGYN | Facility: CLINIC | Age: 37
End: 2022-02-16
Payer: COMMERCIAL

## 2022-02-16 DIAGNOSIS — R87.810 CERVICAL HIGH RISK HPV (HUMAN PAPILLOMAVIRUS) TEST POSITIVE: ICD-10-CM

## 2022-03-12 ENCOUNTER — MYC MEDICAL ADVICE (OUTPATIENT)
Dept: OBGYN | Facility: CLINIC | Age: 37
End: 2022-03-12
Payer: COMMERCIAL

## 2022-03-12 DIAGNOSIS — N95.2 VAGINAL ATROPHY: Primary | ICD-10-CM

## 2022-03-15 NOTE — TELEPHONE ENCOUNTER
The estradiol 0.1 MG/GM vaginal cream that you prescribed is $150  The second prescription you sent in was actually more than the first one. conjugated estrogens (PREMARIN) 0.625 MG/GM vaginal cream It was $440! Any other options?     Pt states she is having a pretty bad flare up and am very itchy.      2/23: [place]  I have 1 more trick up my sleeve-sending the cream to a compounding pharmacy.      Routing pt [employer] message to provider to advise.  Jc Chauhan RN on 3/15/2022 at 10:34 AM

## 2022-03-15 NOTE — TELEPHONE ENCOUNTER
Noted.     Compounded estrogen vaginal cream was sent to [facility]. They will call for insurance/billing/shipping info. They will send it to her home.     They will give her pricing once they run it through insurance.     Continue using steroid ointment twice daily, 2 weeks on 2 weeks off.

## 2022-03-31 ENCOUNTER — OFFICE VISIT (OUTPATIENT)
Dept: SURGERY | Facility: CLINIC | Age: 37
End: 2022-03-31
Payer: COMMERCIAL

## 2022-03-31 VITALS
DIASTOLIC BLOOD PRESSURE: 86 MMHG | HEIGHT: 67 IN | WEIGHT: 293 LBS | OXYGEN SATURATION: 98 % | HEART RATE: 91 BPM | SYSTOLIC BLOOD PRESSURE: 112 MMHG | BODY MASS INDEX: 45.99 KG/M2

## 2022-03-31 DIAGNOSIS — Z86.69 HISTORY OF MIGRAINE HEADACHES: ICD-10-CM

## 2022-03-31 DIAGNOSIS — Z87.19 HISTORY OF GASTROESOPHAGEAL REFLUX (GERD): ICD-10-CM

## 2022-03-31 DIAGNOSIS — E66.01 MORBID OBESITY (H): Primary | ICD-10-CM

## 2022-03-31 DIAGNOSIS — E66.01 MORBID OBESITY (H): ICD-10-CM

## 2022-03-31 DIAGNOSIS — E78.2 MODERATE MIXED HYPERLIPIDEMIA NOT REQUIRING STATIN THERAPY: ICD-10-CM

## 2022-03-31 PROCEDURE — 99205 OFFICE O/P NEW HI 60 MIN: CPT | Performed by: FAMILY MEDICINE

## 2022-03-31 PROCEDURE — 97802 MEDICAL NUTRITION INDIV IN: CPT | Performed by: DIETITIAN, REGISTERED

## 2022-03-31 RX ORDER — PHENTERMINE HYDROCHLORIDE 37.5 MG/1
TABLET ORAL
Qty: 90 TABLET | Refills: 0 | Status: SHIPPED | OUTPATIENT
Start: 2022-03-31 | End: 2022-06-08

## 2022-03-31 NOTE — PROGRESS NOTES
" New Bariatric Surgery Re-Establish Care/Referral Consultation    New Bariatric Surgery Consultation Note    2022    RE: Melinda Bowens  MR#: [id number]  : 1985      Referring provider:       3/30/2022   Who referred you? [facility]       Chief Complaint/Reason for visit: evaluation for possible weight loss surgery    [facility]    I had the pleasure of seeing your patient, Melinda Bowens, to evaluate her obesity and consider her for possible weight loss surgery. As you know, Melinda Bowens is 36 year old.  She has a height of 5' 7\", a weight of 314 lbs 1.6 oz, and calculated Body mass index is 49.2 kg/m .        HISTORY OF PRESENT ILLNESS:  Weight Loss History Reviewed with Patient 3/30/2022   How long have you been overweight? Following one or more pregnancies   What is the most that you have ever weighed? 315   What is the most weight you have lost? 50   I have tried the following methods to lose weight Watching portions or calories, Exercise, Weight Watchers, Atkins type diet (low carb/high protein), OTC Medications   I have tried the following weight loss medications? (Check all that apply) Topamax/Topiramate   Have you ever had weight loss surgery? No       CO-MORBIDITIES OF OBESITY INCLUDE:     3/30/2022   I have the following health issues associated with obesity: Sleep Apnea-recent at home test- not sure how severe, GERD (Reflux)       PAST MEDICAL HISTORY:  Past Medical History:   Diagnosis Date     Anxiety      Asthma     childhood     Calculus, kidney 2012     Cervical high risk HPV (human papillomavirus) test positive 2018    10/2020, 2022     Depressive disorder      Endometriosis of pelvic peritoneum      Gestational diabetes      History of colposcopy 2018     Hyperlipidemia 2013    Met with CE 2013. Decreased LDL to wnl through lifestyle changes only.      Lichen sclerosus et atrophicus  "     Migraines     MRI with neurology, normal 3/2013. Starting on new medication, thinks it is topomax.      Morbid obesity with BMI of 45.0-49.9, adult (H)        PAST SURGICAL HISTORY:  Past Surgical History:   Procedure Laterality Date      SECTION  ,      CHOLECYSTECTOMY  2008     DILATION AND CURETTAGE  2008, 2009-inactive and secretory endometrium; -secretory endometrium     LAPAROSCOPIC HYSTERECTOMY SUPRACERVICAL, BILATERAL SALPINGO-OOPHORECTOMY, COMBINED  2009    inactive endometrium;bilateral endometriosis in right and left ovaries     LAPAROSCOPY DIAGNOSTIC (GYN)  , ,      TONSILLECTOMY  2010       FAMILY HISTORY:   Family History   Problem Relation Age of Onset     Diabetes Maternal Grandmother      Hyperlipidemia Maternal Grandmother      Hypertension Maternal Grandmother      Heart Disease Maternal Grandmother      Osteoporosis Maternal Grandmother      Thyroid Disease Maternal Grandmother      Diabetes Paternal Grandmother      Hyperlipidemia Mother      Hypertension Mother      Thyroid Disease Mother      Hyperlipidemia Other         Aunt, unspecified     Hypertension Other         Aunt, unspecified     Heart Disease Paternal Grandfather        SOCIAL HISTORY:   Social History Questions Reviewed With Patient 3/30/2022   Which best describes your employment status (select all that apply) I work full-time   If you work, what is your occupation?    Which best describes your marital status:    Do you have children? Yes   Who do you have in your support network that can be available to help you for the first 2 weeks after surgery? My    Who can you count on for support throughout your weight loss surgery journey?  and friends   Can you afford 3 meals a day?  Yes   Can you afford 50-60 dollars a month for vitamins? yes       HABITS:     3/30/2022   How often do you drink alcohol? Monthly or less   If you do  drink alcohol, how many drinks might you have in a day? (one drink = 5 oz. wine, 1 can/bottle of beer, 1 shot liquor) 1 or 2   Have you ever used any of the following nicotine products? No   Have you or are you currently using street drugs or prescription strength medication for which you do not have a prescription for? No   Do you have a history of chemical dependency (alcohol or drug abuse)? No       PSYCHOLOGICAL HISTORY:   Psychological History Reviewed With Patient 3/30/2022   Have you ever attempted suicide? Never.   Have you had thoughts of suicide in the past year? No   Have you ever been hospitalized for mental illness or a suicide attempt? Never.   Do you have a history of chronic pain? Yes- back, hip, knee   Have you ever been diagnosed with fibromyalgia? No   Are you currently being treated for any of the following? (select all that apply) Depression, Anxiety   Are you currently seeing a therapist or counselor?  No   Are you currently seeing a psychiatrist? No       ROS:     3/30/2022   Skin:  Leg swelling   HEENT: Headaches   Musculoskeletal: Joint Pain, Back pain, Limited mobility, Swelling of legs   Cardiovascular: Shortness of breath with activity   Pulmonary: Shortness of breath with activity, Awaken from sleep to catch your breath, Experience morning headaches   Gastrointestinal: Heartburn, Reflux, Diarrhea   Genitourinary: None of the above   Hematological: None of the above   Neurological: Migraine headaches   Female only: None of the above       EATING BEHAVIORS:     3/30/2022   Have you or anyone else thought that you had an eating disorder? No   Do you currently binge eat (eat a large amount of food in a short time)? No   Are you an emotional eater? Yes   Do you get up to eat after falling asleep? No       EXERCISE:     3/30/2022   How often do you exercise? 1 to 2 times per week   What is the duration of your exercise (in minutes)? 20 Minutes   What types of exercise do you do? walking   What  keeps you from being more active?  My ability to walk or move around is limited, Lack of Time, Too tired       MEDICATIONS:  Current Outpatient Medications   Medication Sig Dispense Refill     augmented betamethasone dipropionate (DIPROLENE-AF) 0.05 % external ointment Apply topically 2 times daily Use for 2 weeks then twice a week thereafter consistently 45 g 0     buPROPion (WELLBUTRIN XL) 300 MG 24 hr tablet TAKE 1 TABLET (300 MG) BY MOUTH ONCE DAILY.       clobetasol (TEMOVATE) 0.05 % external ointment Apply sparingly to affected area twice daily for 2 weeks, then take 2 weeks off and repeat.  Do not apply to face. 60 g 3     COMPOUNDED NON-CONTROLLED SUBSTANCE (CMPD RX) - PHARMACY TO MIX COMPOUNDED MEDICATION Bi-est: Estriol 0.4mg, Estradiol 0.1mg. Place 1 gram vaginally at bedtime three times per week with finger 60 g 1     conjugated estrogens (PREMARIN) 0.625 MG/GM vaginal cream Place 1 g vaginally At Bedtime For 30 nights, then three times per week thereafter. Use finger for application. 30 g 3     nortriptyline (PAMELOR) 75 MG capsule TAKE 1 CAPSULE BY MOUTH EVERYDAY AT BEDTIME       omeprazole (PRILOSEC) 40 MG DR capsule Take 40 mg by mouth       ondansetron (ZOFRAN-ODT) 4 MG ODT tab        rizatriptan (MAXALT) 10 MG tablet TAKE 1 TAB BY MOUTH AT ONSET OF MIGRAINE.MAY REPEAT IN 2 HOURS.MAX 2TABS/24HR       traZODone (DESYREL) 100 MG tablet Take 100 mg by mouth At Bedtime       venlafaxine (EFFEXOR) 75 MG tablet TAKE 1 TABLET BY MOUTH TWICE A DAY         ALLERGIES:  Allergies   Allergen Reactions     Amoxicillin Hives     Penicillins Hives     ROS  General  Fatigue: yes  HEENT  Hx of glaucoma: no  Vision changes: no  Cardiovascular  Hx of heart disease: no  Chest Pain with Exertion: no  Palpitations: no  Pulmonary  Shortness of breath at rest: no  Shortness of breath with exertion: yes  Stop-bang score: known sleep apnea  [place] Score: na  Gastrointestinal  Heartburn: yes, takes daily  "omeprazole  Psychiatric  Moods Stable: she is down  Endocrine  Polydipsia: yes  No personal or family history of medullary thyroid cancer: no  Neurologic  Hx of seizures: no  Migraine headaches: yes    Birth control: hysterectomy    LABS/IMAGING/MEDICAL RECORDS REVIEW: no recent    PHYSICAL EXAM:  Ht 1.702 m (5' 7\")   Wt 142.5 kg (314 lb 1.6 oz)   BMI 49.20 kg/m    Physical Exam:    GEN: Alert and oriented in no acute distress.   HEENT: mucous membranes moist  NECK: supple with LAD or thyromegaly  LUNGS: CTA without wheezes or crackles, good air movement throughout  CV: RRR no MRG  ABDOMEN: moderate protuberance, soft, NT  SKIN: no rashes, no skin tags, no acanthosis nigrans  EXTREMITIES: trace edema    In summary, Melinda Bowens has Class III obesity with a body mass index of Body mass index is 49.2 kg/m . kg/m2 and the comorbidities stated above. She completed an informational seminar and is a candidate for the undecided bariatric surgery   Once the patient has completed the requirements in their task list and there are no further recommendations, the pt will be allowed to see the surgeon of her choice for consultation on the bariatric surgery. Patient verbalizes understanding of the process to surgery and expectations for the postoperative period including the need for lifelong lifestyle changes, vitamin supplementation, and laboratory monitoring.    Patient is aware that they should not get pregnant for 18 months after surgery. Birth control options have been discussed. She has had a hysterectomy.    We discussed healthy habits to assist with weight loss. She could consider pool therapy We discussed medication that may assist with weight loss. Phentemrine was prescribed. Risks/ benefits and possible side effects were discussed and questions were answered. Written information was given.     Sincerely,     FRANNY Archer MD    Total time spent on the date of this encounter doing: chart review, review of " test results, patient visit, physical exam, education, counseling, developing plan of care and documenting = 70 minutes.

## 2022-03-31 NOTE — PROGRESS NOTES
"New Bariatric Nutrition Consultation Note    Reason For Visit: Nutrition Assessment    Melinda Bowens is a 36 year old presenting today for new bariatric nutrition consult.  Pt is interested not interested in surgery at this time.  Patient is accompanied by self.    Support System Reviewed With Patient 3/30/2022   Who do you have in your support network that can be available to help you for the first 2 weeks after surgery? My    Who can you count on for support throughout your weight loss surgery journey?  and friends       ANTHROPOMETRICS:  Estimated body mass index is 49.2 kg/m  as calculated from the following:    Height as of an earlier encounter on 3/31/22: 1.702 m (5' 7\").    Weight as of an earlier encounter on 3/31/22: 142.5 kg (314 lb 1.6 oz).    Required weight loss goal pre-op:5 lbs from initial consult weight (goal weight 309 lbs or less before surgery)-if has surgery        3/30/2022   I have tried the following methods to lose weight Watching portions or calories, Exercise, Weight Watchers, Atkins type diet (low carb/high protein), OTC Medications       Weight Loss Questions Reviewed With Patient 3/30/2022   How long have you been overweight? Following one or more pregnancies       MEDICATIONS:  Phentermine     NUTRITION HISTORY:  Recall Diet Questions Reviewed With Patient 3/30/2022   Describe what you typically consume for breakfast (typical or most recent): Cereal, toast or cream of wheat (7:30-10:00)  Granola yogurt with berries    Describe what you typically consume for lunch (typical or most recent): [place], salad, veggies or fruit (1 time per week eats lunch)    Describe what you typically consume for supper (typical or most recent): Traditional family meals- tacos, chicken, pasta, pizza (6:00-6:30)    Describe what you typically consume as snacks (typical or most recent): Fruit, crackers, cheese, yogurt, veggies   How many ounces of water, or other low calorie drinks, do " you drink daily (8 oz=1 glass)? 32 oz   How many ounces of caffeine (coffee, tea, pop) do you drink daily (8 oz=1 glass)? 8 oz   How many ounces of carbonated (pop, beer, sparkling water) drinks do you drinky daily (8 oz=1 glass)? 8 oz   How many ounces of juice, pop, sweet tea, sports drinks, protein drinks, other sweetened drinks, do you drink daily (8 oz=1 glass)? 8 oz   How often do you drink alcohol? Monthly or less   If you do drink alcohol, how many drinks might you have in a day? (one drink = 5 oz. wine, 1 can/bottle of beer, 1 shot liquor) 1 or 2       Eating Habits 3/30/2022   Do you have any dietary restrictions? Yes   Do you currently binge eat (eat a large amount of food in a short time)? No   Are you an emotional eater? Yes   Do you get up to eat after falling asleep? No   What foods do you crave? Fruit and veggies. Chocolate and coke.       ADDITIONAL INFORMATION:  Patient feels she struggles with forgetting to eat lunch as will keep working and realize it may be 4:00 PM.  Patient works at non-profit as .  Patient eats breakfast and dinner.  Patient may snack while at work if she remembers to bring food from home.  Patient chops vegetables and fruits on the weekend for her kids and thinks she could pack lunch to bring to work.       Patient has 18 and 13 year old children.  Patient adopting 11 year old foster child and has 3 and 4 year old foster children.     Patient has tracked intake on [employer] 1200 kcals on weekdays.  1500+ calories on weekends.     Patient has hip and joint making it difficult to exercise.  Patient feels she has gained 60 lbs in the past year due to lack of movement.       Dining Out History Reviewed With Patient 3/30/2022   How often do you dine out? Around once a week.   Where do you dine out? (select all that apply) sit-down restaurants, fast food chains, take out   What types of food do you order when you dine out? Mexican, chicken       Physical Activity  Reviewed With Patient 3/30/2022   How often do you exercise? 1 to 2 times per week   What is the duration of your exercise (in minutes)? 20 Minutes   What types of exercise do you do? walking   What keeps you from being more active?  My ability to walk or move around is limited, Lack of Time, Too tired       NUTRITION DIAGNOSIS:  Obesity r/t long history of self-monitoring deficit and excessive energy intake aeb BMI >30 kg/m2.    INTERVENTION:  Intervention Provided/Education; Patient given diet education material for weight loss surgery in case patient decides to have surgery.  Discussed options for quick lunch choices that patient could eat at work.  Reviewed meal patterns and beverage choices.     Questions Reviewed With Patient 3/30/2022   How ready are you to make changes regarding your weight? Number 1 = Not ready at all to make changes up to 10 = very ready. 7   How confident are you that you can change? 1 = Not confident that you will be successful making changes up to 10 = very confident. 6       Patient Understanding: good  Expected Patient Engagement: good    GOALS:  Eat lunch during the week     Time spent with patient: 45 minutes    Hayder Myers, RD, LD  [facility] Outpatient Dietitian/Weight Loss Clinic   [phone] (office phone)

## 2022-03-31 NOTE — PATIENT INSTRUCTIONS
Melinda Nancie Bowens  March 31, 2022        Bariatric Task List      Required Weight loss:    Lose 5 lbs prior to surgery.  Goal Weight: 309 lbs  Tasks:  Have preoperative laboratory tests drawn.     Psychological Evaluation with MMPI and clearance for weight loss surgery.    Achieve clearance from dietitian to see surgeon.    Have consultation with sleep clinic. If recent sleep test showed moderate or severe sleep apnea, start CPAP    A total of 6 structured dietitian weight loss visits if required by your insurance.

## 2022-04-12 DIAGNOSIS — L90.0 LICHEN SCLEROSUS ET ATROPHICUS: ICD-10-CM

## 2022-04-12 RX ORDER — ESTRADIOL 0.1 MG/G
2 CREAM VAGINAL
Qty: 42.5 G | Refills: 0 | Status: SHIPPED | OUTPATIENT
Start: 2022-04-14 | End: 2022-08-22

## 2022-04-12 RX ORDER — BETAMETHASONE VALERATE 1.2 MG/G
CREAM TOPICAL 2 TIMES DAILY
Qty: 42.5 G | Refills: 0 | Status: SHIPPED | OUTPATIENT
Start: 2022-04-12 | End: 2022-08-11

## 2022-04-14 DIAGNOSIS — L90.0 LICHEN SCLEROSUS ET ATROPHICUS: Primary | ICD-10-CM

## 2022-04-21 NOTE — PROGRESS NOTES
"Video-Visit Details    Type of service:  Video Visit    Video Start Time (time video started): 10:29     Video End Time (time video stopped): 11:02    Originating Location (pt. Location): Other work setting     Distant Location (provider location):  [facility]     Mode of Communication:  Video Conference via [employer]    PRE SURGICAL WEIGHT LOSS NUTRITION APPOINTMENT    Melinda Bowens  1985  female  [id number]  36 year old    ASSESSMENT    Desired Surgical Procedure: undecided     REASON FOR VISIT:  Melinda Bowens is a 36 year old year old female presents today for a pre-surgical weight loss follow-up appointment. Patient accompanied by self.    DIAGNOSIS:  Weight Status Obesity Grade III BMI >40    ANTHROPOMETRICS:  Height: 5'7\"  Initial Weight: 314 lbs        Current weight: did not weigh this month     VITAMINS AND MINERALS:  Did not start vitamins     Vitamin C -3 gummy (250 mg)      NUTRITION HISTORY:  Breakfast: eats when gets to work -apple  Snack: Granola bar  Snack: Banana  Eats lunch 1 time per week-veggies, frutis, nuts and cheese-wheat thins and turkey ; 1/2 sandwich and salad (catered lunch)  Supper:Traditional family meals- tacos, chicken, pasta, pizza (6:00-6:30) using kids small plates   Snacks: fruit, crackers, cheese, yogurt, veggies; sugar free jello; cheese sticks  Fluids Consumed: Water-3 bottles; pop  Eating slower: No  Chewing foods thoroughly: No  Take 20-30 minutes to consume each meal: No  Fluids and meals separate by at least 30 minutes: No  Carbonation: yes  Caffeine: yes   Additional Information: Patient feels she struggles with forgetting to eat lunch as will keep working and realize it may be 4:00 PM.  Patient works at non-profit as .  Patient eats breakfast and dinner.  Patient may snack while at work if she remembers to bring food from home.  Patient chops vegetables and fruits on the weekend for her kids. "      Patient has 18 and 13 year old children.  Patient adopting 11 year old foster child and has 3 and 4 year old foster children.      Patient has hip and joint making it difficult to exercise.  Patient feels she has gained 60 lbs in the past year due to lack of movement.     Patient states has started taking medication for the past 2 weeks; adding smaller snacks throughout the day; trying to reduce pop intake; eating less at night; counted jelly beans and reeces peanut butter candy at [facility]    MEDICATION  Phentermine     PHYSICAL ACTIVITY:  Type: unable due to hip and joint pain; patient states she just stuck on the floor and her  had to help her up     DIAGNOSIS:  Previous Nutrition Diagnosis: Obesity related to long history of self- monitoring deficit and excessive energy intake evidenced by BMI of 49.2 kg/m2  No change, modified below    Previous goals:   Eat lunch during the week -not met     Current Nutrition Diagnosis: Obesity related to long history of self-monitoring deficit and excessive energy intake as evidenced by po intake     INTERVENTION:  Nutrition Prescription: Recommended energy/nutrient modification.    GOALS:  Sit and eat dinner   Chew foods to applesauce texture  Start complete multivitamin and mineral at night     Intervention:  - Discussed progress towards previous goals.  - Reinforced importance of making behavior changes in preparation for bariatric surgery.   - Assessed learning needs and learning preferences     NUTRITION MONITORING AND EVALUATION:  Expected patient engagement: good  Patient demonstrated good understanding.     Follow up: Continue to monitor patient closely regarding weight loss and diet.  # of visits needed: 2-3  Cleared by RD: No     TIME SPENT WITH PATIENT: 33 minutes    Hayder Myers, RD, LD  [facility] Outpatient Dietitian/Weight Loss Clinic   [phone] (office phone)

## 2022-04-22 ENCOUNTER — VIRTUAL VISIT (OUTPATIENT)
Dept: SURGERY | Facility: CLINIC | Age: 37
End: 2022-04-22
Payer: COMMERCIAL

## 2022-04-22 DIAGNOSIS — E66.01 MORBID OBESITY (H): ICD-10-CM

## 2022-04-22 PROCEDURE — 97803 MED NUTRITION INDIV SUBSEQ: CPT | Mod: GT | Performed by: DIETITIAN, REGISTERED

## 2022-04-22 NOTE — PATIENT INSTRUCTIONS
Goals:  Sit and eat dinner  Start complete multivitamin and mineral (take at dinner or night)   Chew foods to applesauce texture    Call [phone] to schedule your next RD appointment.    Thanks,    Hayder Ruiz, RD, LD  [facility] Outpatient Dietitian/Weight Loss Clinic   [phone] (office phone)

## 2022-05-03 NOTE — TELEPHONE ENCOUNTER
7/26/18 NIL pap, + HR HPV 16 & other.  Plan: colposcopy by 10/26/18  8/7/18 Colpo: ECC-benign.  Bx-CARLINE 1. Plan: cotest in 1 year  09/18/19 Patient is lost to pap tracking follow-up.   10/16/20 NIL pap, +HR HPV 16 & other. Plan: colposcopy due before 1/16/21   Yes

## 2022-05-31 DIAGNOSIS — Z01.812 PRE-PROCEDURE LAB EXAM: Primary | ICD-10-CM

## 2022-06-07 ENCOUNTER — OFFICE VISIT (OUTPATIENT)
Dept: OBGYN | Facility: CLINIC | Age: 37
End: 2022-06-07

## 2022-06-07 ENCOUNTER — LAB (OUTPATIENT)
Dept: LAB | Facility: CLINIC | Age: 37
End: 2022-06-07
Payer: COMMERCIAL

## 2022-06-07 VITALS
WEIGHT: 293 LBS | DIASTOLIC BLOOD PRESSURE: 74 MMHG | SYSTOLIC BLOOD PRESSURE: 118 MMHG | HEART RATE: 74 BPM | BODY MASS INDEX: 48.87 KG/M2

## 2022-06-07 DIAGNOSIS — Z01.812 PRE-PROCEDURE LAB EXAM: ICD-10-CM

## 2022-06-07 DIAGNOSIS — R87.810 CERVICAL HIGH RISK HPV (HUMAN PAPILLOMAVIRUS) TEST POSITIVE: Primary | ICD-10-CM

## 2022-06-07 LAB — HCG UR QL: NEGATIVE

## 2022-06-07 PROCEDURE — 88305 TISSUE EXAM BY PATHOLOGIST: CPT | Performed by: PATHOLOGY

## 2022-06-07 PROCEDURE — 81025 URINE PREGNANCY TEST: CPT

## 2022-06-07 PROCEDURE — 57456 ENDOCERV CURETTAGE W/SCOPE: CPT | Performed by: OBSTETRICS & GYNECOLOGY

## 2022-06-07 NOTE — PROGRESS NOTES
Melinda is a 36 year old who is being evaluated via a billable video visit.      If the video visit is dropped, the invitation should be resent by: Text to cell phone: [phone]  Will anyone else be joining your video visit? No      Video-Visit Details    Type of service:  Video Visit    Video Start Time: 10:06 AM    Video End Time:10:27 AM        Originating Location (pt. Location): Home    Distant Location (provider location):  [facility] [place]     Platform used for Video Visit: [facility]                          Bariatric Care Clinic Non Surgical Follow up Visit   Date of visit: 6/7/2022  Physician: FRANNY Archer MD, MD  Primary Care is Clinic, [facility].  Melinda Bowens   36 year old  female     Initial Weight: 314#  Initial BMI: 49.2  Today's Weight:   Wt Readings from Last 1 Encounters:   06/08/22 142.4 kg (314 lb)     Body mass index is 49.18 kg/m .           Assessment and Plan   Assessment: Melinda is a 36 year old year old female who presents for medical weight management.      Plan:    1. Morbid obesity (H)  Patient was congratulated on her success thus far. Healthy habits to assist with further weight loss were discussed. She will try to increase her exercise. She will continue the phentermine and we will try adding topamax. We discussed the patient's co-morbid conditions including GERD and migraine headaches. These likely will improve with healthy habits and weight loss. She is still considering bariatric surgery.    2. History of gastroesophageal reflux (GERD)  This may improve with healthy habits and weight loss.    3. History of migraine headaches  This may improve with healthy habits and weight loss.    4. Moderate mixed hyperlipidemia not requiring statin therapy  This may improve with healthy habits and weight loss.    Follow up monthly with dietician and in 3 months with myself           INTERIM HISTORY  Patient had an initial surgical  consultation in March. She is not sure she wants surgery. At that time she was prescribed phentermine. She feels that it is helping to control her appetite. He is helping less than when she first started it.     DIETARY HISTORY  Meals Per Day: 3  Eating Protein First?: yes  Food Diary: B:yogurt and granola or cheese stick or toast with peanut butter L:turkey and cheese, sometimes on a wrap D:tacos or pasta or chicken, usually with vegetables  Fluid Intake: working on it  Portion Control: yes  Calorie Containing Beverages: cut out soda  Typical Protein Food Choices: meat, cheese, yogurt  Choosing Whole Grains: usually  Meals at Restaurant per week:0-1    Positive Changes Since Last Visit: She stopped drinking soda, she is eating on smaller plates she is not eating 3 hours before bed, she is being more intentional with her food choices  Struggling With: exercise    Knowledgeable in Reading Food Labels: yes  Getting Adequate Protein: yes  Sleeping 7-8 hours/day yes  Stress management not discussed    PHYSICAL ACTIVITY PATTERNS:  Walking her dogs more frequently (3 x per week), back and hip pain    REVIEW OF SYSTEMS  GENERAL/CONSTITUTIONAL:  Fatigue: sometimes  HEENT:  Vision changes, glaucoma: no  CARDIOVASCULAR:  Chest Pain with Exertion: no  PULMONARY:  Dyspnea on exertion: sometimes  NEUROLOGIC:  Paresthesias: no  PSYCHIATRIC:  Moods: stable  MUSCULOSKELETAL/RHEUMATOLOGIC  Arthralgias: yes  Myalgias: yes  ENDOCRINE:  Monitoring Blood Sugars: na  Sugars Well Controlled: na  No personal or family history of medullary thyroid cancer not discussed  :  Birth control: hysterectomy       Patient Profile   Social History     Social History Narrative     Not on file        Past Medical History   Past Medical History:   Diagnosis Date     Anxiety      Asthma     childhood     Calculus, kidney 12/2012     Cervical high risk HPV (human papillomavirus) test positive 07/26/2018    10/2020, 2/2022     Depressive disorder       "Endometriosis of pelvic peritoneum      Gestational diabetes      History of colposcopy 2018     Hyperlipidemia 2013    Met with CE 2013. Decreased LDL to wnl through lifestyle changes only.      Lichen sclerosus et atrophicus      Migraines     MRI with neurology, normal 3/2013. Starting on new medication, thinks it is topomax.      Morbid obesity with BMI of 45.0-49.9, adult (H)      Patient Active Problem List   Diagnosis     Morbid obesity (H)     Cervical high risk HPV (human papillomavirus) test positive     Lichen sclerosus et atrophicus of the vulva     Common migraine     Depression with anxiety     History of gastroesophageal reflux (GERD)     IBS (irritable bowel syndrome)     Seasonal allergies       Past Surgical History  She has a past surgical history that includes Laparoscopy diagnostic (gyn) (, , ); Cholecystectomy (2008); tonsillectomy (2010);  section (, ); Dilation and curettage (2008, 2009); and Laparoscopic hysterectomy supracervical, bilateral salpingo-oophorectomy, combined (2009).     Examination   Ht 1.702 m (5' 7\")   Wt 142.4 kg (314 lb)   BMI 49.18 kg/m      GEN: healthy, alert and no distress  PSYCH: Alert and oriented times 3; coherent speech, normal   rate and volume, able to articulate logical thoughts, able   to abstract reason, no tangential thoughts, no hallucinations   or delusions, affect is normal  RESP: No cough, no audible wheezing, able to talk in full sentences  Remainder of exam unable to be completed due to virtual visit           Counseling:   We reviewed the important post op bariatric recommendations:  -eating 3 meals daily  -eating protein first, getting >60gm protein daily  -eating slowly, chewing food well  -avoiding/limiting calorie containing beverages  -limiting starchy vegetables and carbohydrates, choosing wheat, not white with breads,   crackers, pastas, jerad, bagels, tortillas, " rice  -limiting restaurant or cafeteria eating to twice a week or less    We discussed the importance of restorative sleep and stress management in maintaining a healthy weight.  We discussed the National Weight Control Registry healthy weight maintenance strategies and ways to optimize metabolism.  We discussed the importance of physical activity including cardiovascular and strength training in maintaining a healthier weight.    Total time spent on the date of this encounter doing: chart review, review of test results, patient visit, physical exam, education, counseling, developing plan of care and documenting = 37 minutes.         FRANNY Archer MD  [facility]

## 2022-06-07 NOTE — PATIENT INSTRUCTIONS
Sanjay Lawrence,  It was nice meeting with you today. Please call [phone] to set up a follow up appointment with me in 3 months.  Lisa Archer     Eat Better ? Move More ? Live Well    Eat 3 nutrient-rich meals each day    Don t skip meals--it will cause you to overeat later in the day!    Eating fiber (vegetables/fruits/whole grains) and protein with meals helps you stay full longer    Choose foods with less than 10 grams of sugar and 5 grams of fat per serving to prevent excess calories and weight re-gain  Eat around the same times each day to develop a routine eating schedule   Avoid snacking unless physically hungry.   Planned snacks: 1-2 times per day and no more than 150 calories    Eat protein first   Protein helps with healing, maintaining adequate muscle mass, reducing hunger and optimizing nutritional status   Aim for 60-80 grams of protein per day   Fill up on Fiber   Fiber comes from plants--fruits, veggies, whole grains, nuts/seeds and beans   Fiber is low in calories, high in phytonutrients and helps you stay full longer   Aim for 25-35 grams per day by eating fiber with meals and snacks  Eat S-L-O-W-L-Y   Take 20-30 minutes to eat each meal by taking small bites, chewing foods to applesauce consistency or 20-30 times before you swallow   Eating foods too fast can delay satiety/fullness signals and increase overeating   Slow down your eating by using toddler utensils, putting your fork/spoon down between bites and not watching TV or emailing during meals!   Keep a Journal         Writing down what you eat, how you feel and when you are active helps you identify new changes to work on from week to week         Look for ways to cut 100 calories from your current diet 2-3 times per day  Drink 64 ounces of 0-Calorie drinks between meals   Water   Zero calorie Propel  or Vitamin Water     So[employer] Lifewater  Zero Calories   Crystal Light , Sugar-Free Jose-Aid , and other sugar-free lemonade or flavored marie    Keep Caffeine to less than 300mg per day ie: 3-6oz cups coffee     Work up to 45-60 minutes of physical activity most days of the week   Helps with losing weight and prevent regaining those extra pounds!    Do a combo of cardio (walking/water exercises) and strength training (lifting weights/Vinyasa yoga)    Avoid Mindless Eating   Be present when you eat--take note of the smell, taste and quality of your food   Make a list of alternative activities you could do to prevent eating out of boredom/stress  Go for a walk, call a friend, chew gum, paint your nails, re-organize the garage, etc

## 2022-06-07 NOTE — PROGRESS NOTES
INDICATIONS:                                                    Is a pregnancy test required: No.  Was it positive or negative?  Negative  Was a consent obtained?  Yes    Today's PHQ-2 Score:   PHQ-2 ( 1999 [employer]) 2/9/2022   Q1: Little interest or pleasure in doing things 2   Q2: Feeling down, depressed or hopeless 3   PHQ-2 Score 5     Today's PHQ-9 Score:    PHQ-9 SCORE 2/9/2022   PHQ-9 Total Score 16         Melinda Bowens, is a 36 year old female, who had a recent nil pap.  HPV 16 and 18 positive Yes prior history of abnormal pap. Here today for colposcopy. Discussed indication, risks of infection and bleeding.    Her last pap was   Lab Results   Component Value Date    PAP NIL 10/16/2020    **PATIENT INTOLERANT OF ACETIC ACID ON COLPO**    7/26/18 NIL pap, + HR HPV 16 & other.  Plan: colposcopy by 10/26/18  8/7/18 Colpo: ECC-benign.  Bx-CARLINE 1. Plan: cotest in 1 year  09/18/19 Patient is lost to pap tracking follow-up.   10/16/20 NIL pap, +HR HPV 16 & other. Plan: colposcopy due before 1/16/21  11/10/20 colp appt - pt intolerant of acetic acid. Reschedule.  1/5/21 [place] - ECC, normal. Plan pap in 12 months. Provider notified pt  2/9/22 NIL pap, + HR HPV 16 & other HR type (not 18).     PROCEDURE:                                                      Cervix is stained with Lugol's solution and viewed colposcopically. Squamocolumnar junction is not visualized in it's entirety. no visible lesions . Biopsy done No. Endocervical curretage Done       POST PROCEDURE:                                                      IMPRESSION: Patient tolerated procedure well and Normal cervix    PLAN : Await the results of the biopsies.  She tolerated the procedure well. There were no complications. Patient was discharged in stable condition.    Patient advised to call the clinic if excessive bleeding, pelvic pain, or fever.     Follow-up based on pathology results. We discussed a referral to GYN ONC for a trachelectomy  depending on the results of the endocervical curettage.    Hilary Salazar MD

## 2022-06-08 ENCOUNTER — VIRTUAL VISIT (OUTPATIENT)
Dept: SURGERY | Facility: CLINIC | Age: 37
End: 2022-06-08
Payer: COMMERCIAL

## 2022-06-08 VITALS — WEIGHT: 293 LBS | HEIGHT: 67 IN | BODY MASS INDEX: 45.99 KG/M2

## 2022-06-08 DIAGNOSIS — E66.01 MORBID OBESITY (H): Primary | ICD-10-CM

## 2022-06-08 DIAGNOSIS — E78.2 MODERATE MIXED HYPERLIPIDEMIA NOT REQUIRING STATIN THERAPY: ICD-10-CM

## 2022-06-08 DIAGNOSIS — Z87.19 HISTORY OF GASTROESOPHAGEAL REFLUX (GERD): ICD-10-CM

## 2022-06-08 DIAGNOSIS — Z86.69 HISTORY OF MIGRAINE HEADACHES: ICD-10-CM

## 2022-06-08 PROCEDURE — 99214 OFFICE O/P EST MOD 30 MIN: CPT | Mod: GT | Performed by: FAMILY MEDICINE

## 2022-06-08 RX ORDER — TOPIRAMATE 50 MG/1
TABLET, FILM COATED ORAL
Qty: 90 TABLET | Refills: 1 | Status: SHIPPED | OUTPATIENT
Start: 2022-06-08 | End: 2022-12-05

## 2022-06-08 RX ORDER — PHENTERMINE HYDROCHLORIDE 37.5 MG/1
TABLET ORAL
Qty: 90 TABLET | Refills: 0 | Status: SHIPPED | OUTPATIENT
Start: 2022-06-08 | End: 2022-09-26

## 2022-06-10 LAB
PATH REPORT.COMMENTS IMP SPEC: NORMAL
PATH REPORT.COMMENTS IMP SPEC: NORMAL
PATH REPORT.FINAL DX SPEC: NORMAL
PATH REPORT.GROSS SPEC: NORMAL
PATH REPORT.MICROSCOPIC SPEC OTHER STN: NORMAL
PATH REPORT.RELEVANT HX SPEC: NORMAL
PHOTO IMAGE: NORMAL

## 2022-06-13 ENCOUNTER — PATIENT OUTREACH (OUTPATIENT)
Dept: OBGYN | Facility: CLINIC | Age: 37
End: 2022-06-13
Payer: COMMERCIAL

## 2022-06-13 DIAGNOSIS — R87.810 CERVICAL HIGH RISK HPV (HUMAN PAPILLOMAVIRUS) TEST POSITIVE: ICD-10-CM

## 2022-08-03 ENCOUNTER — PATIENT OUTREACH (OUTPATIENT)
Dept: ONCOLOGY | Facility: CLINIC | Age: 37
End: 2022-08-03

## 2022-08-03 NOTE — PROGRESS NOTES
New Patient Hematology / Oncology Nurse Navigator Note     Referral Date: 8/2/22    Referring provider:   Hilary Salazar MD   [address] [facility] [place] [address]   Phone: [phone]   Fax: [phone]     Referring Clinic/Organization: [facility]     Referred to: GynOnc    Requested provider (if applicable): First available - did not specify     Evaluation for : s/p LASH/ BSO for endo. Persistent HPV positive. CARLINE 1 in 2018. Very challenging Colpo due to atrophy. Seeking trachelectomy.     Clinical History (per Nurse review of records provided):      6/7 path showing:  Final Diagnosis   Endocervix, curettings:   - No endocervical tissue identified.   - A 3 mm fragment of normal mature squamous epithelium.     -- BOOKMARKED    6/7/22 Office Visit OBGyn -- BOOKMARKED    2/9/22 PAP/HPV--BOOKMARKED    Clinical Assessment / Barriers to Care (Per Nurse):    Pt lives in [place]    Records Location: [facility]     Records Needed:     N/A    Additional testing needed prior to consult:     N/A    Referral updates and Plan:   Referral received and chart reviewed. Patient aware of referral/plan per MyChart communications with OBGyn. MyChart to patient confirming referral was received and scheduling will be reaching out. Provided contact info if questions.     Will route to scheduling to arrange surgical consultation per patient location preference. Will follow-up and reach out as needed.     Sylwia Chavez, MEDN, RN, PHN, OCN  Hematology/Oncology Nurse Navigator  [facility]  [phone]

## 2022-08-09 NOTE — TELEPHONE ENCOUNTER
RECORDS STATUS - ALL OTHER DIAGNOSIS      RECORDS RECEIVED FROM: EPIC   DATE RECEIVED: 08/09/22   NOTES STATUS DETAILS   OFFICE NOTE from referring provider EPIC 06/07/22: Dr. Hliary Salazar   DISCHARGE REPORT from the ER [facility] 08/13/12: [facility]   OPERATIVE REPORT [facility] 11/11/09: Laparoscopic assisted supracervical hysterectomy, bilateral salpingo-oophorectomy  04/30/09: Dilatation and curettage, pelviscopy, fulguration of endometriosis and lysis of adhesions  09/24/08: Dilatation and curettage  07/10/07: Dilatation and curettage, pelviscopy, fulguration of endometriosis, drainage of left paratubal cyst.      MEDICATION LIST [facility]    LABS     PATHOLOGY REPORTS Reports in [facility] Surg Path:  06/07/22: [id number]  01/05/21:    08/07/18: [id number]  11/11/09: [id number]  04/30/09: [id number]  09/24/08: [id number]  02/06/08: [id number]  07/10/07: [id number]    Cyto:  02/09/22: [id number]   ANYTHING RELATED TO DIAGNOSIS Epic Most recent 06/07/22   IMAGING (NEED IMAGES & REPORT)     CT SCANS PACS 08/13/12, 02/12/09: CT Abd Pel   ULTRASOUND PACS 09/23/08: US Pelvis

## 2022-08-11 ENCOUNTER — INFUSION THERAPY VISIT (OUTPATIENT)
Dept: INFUSION THERAPY | Facility: CLINIC | Age: 37
End: 2022-08-11
Attending: STUDENT IN AN ORGANIZED HEALTH CARE EDUCATION/TRAINING PROGRAM
Payer: COMMERCIAL

## 2022-08-11 ENCOUNTER — MEDICAL CORRESPONDENCE (OUTPATIENT)
Dept: HEALTH INFORMATION MANAGEMENT | Facility: CLINIC | Age: 37
End: 2022-08-11

## 2022-08-11 VITALS
DIASTOLIC BLOOD PRESSURE: 73 MMHG | OXYGEN SATURATION: 97 % | HEART RATE: 91 BPM | RESPIRATION RATE: 18 BRPM | SYSTOLIC BLOOD PRESSURE: 112 MMHG | TEMPERATURE: 98.2 F

## 2022-08-11 DIAGNOSIS — G43.009 MIGRAINE WITHOUT AURA AND WITHOUT STATUS MIGRAINOSUS, NOT INTRACTABLE: Primary | ICD-10-CM

## 2022-08-11 DIAGNOSIS — L90.0 LICHEN SCLEROSUS ET ATROPHICUS: ICD-10-CM

## 2022-08-11 PROCEDURE — 250N000009 HC RX 250: Performed by: STUDENT IN AN ORGANIZED HEALTH CARE EDUCATION/TRAINING PROGRAM

## 2022-08-11 PROCEDURE — 96375 TX/PRO/DX INJ NEW DRUG ADDON: CPT

## 2022-08-11 PROCEDURE — 258N000003 HC RX IP 258 OP 636: Performed by: STUDENT IN AN ORGANIZED HEALTH CARE EDUCATION/TRAINING PROGRAM

## 2022-08-11 PROCEDURE — 250N000011 HC RX IP 250 OP 636: Performed by: STUDENT IN AN ORGANIZED HEALTH CARE EDUCATION/TRAINING PROGRAM

## 2022-08-11 PROCEDURE — 96365 THER/PROPH/DIAG IV INF INIT: CPT

## 2022-08-11 RX ORDER — BETAMETHASONE VALERATE 1.2 MG/G
CREAM TOPICAL 2 TIMES DAILY
Qty: 42.5 G | Refills: 0 | Status: SHIPPED | OUTPATIENT
Start: 2022-08-11

## 2022-08-11 RX ORDER — ALBUTEROL SULFATE 0.83 MG/ML
2.5 SOLUTION RESPIRATORY (INHALATION)
Status: CANCELLED | OUTPATIENT
Start: 2022-08-11

## 2022-08-11 RX ORDER — ALBUTEROL SULFATE 90 UG/1
1-2 AEROSOL, METERED RESPIRATORY (INHALATION)
Status: CANCELLED
Start: 2022-08-11

## 2022-08-11 RX ORDER — DIPHENHYDRAMINE HYDROCHLORIDE 50 MG/ML
50 INJECTION INTRAMUSCULAR; INTRAVENOUS
Status: CANCELLED
Start: 2022-08-11

## 2022-08-11 RX ORDER — ONDANSETRON 2 MG/ML
4 INJECTION INTRAMUSCULAR; INTRAVENOUS
Status: DISCONTINUED | OUTPATIENT
Start: 2022-08-11 | End: 2022-08-11

## 2022-08-11 RX ORDER — EPINEPHRINE 1 MG/ML
0.3 INJECTION, SOLUTION INTRAMUSCULAR; SUBCUTANEOUS EVERY 5 MIN PRN
Status: CANCELLED | OUTPATIENT
Start: 2022-08-11

## 2022-08-11 RX ORDER — METHYLPREDNISOLONE SODIUM SUCCINATE 125 MG/2ML
125 INJECTION, POWDER, LYOPHILIZED, FOR SOLUTION INTRAMUSCULAR; INTRAVENOUS
Status: CANCELLED
Start: 2022-08-11

## 2022-08-11 RX ORDER — MEPERIDINE HYDROCHLORIDE 25 MG/ML
25 INJECTION INTRAMUSCULAR; INTRAVENOUS; SUBCUTANEOUS EVERY 30 MIN PRN
Status: CANCELLED | OUTPATIENT
Start: 2022-08-11

## 2022-08-11 RX ORDER — ONDANSETRON 2 MG/ML
4 INJECTION INTRAMUSCULAR; INTRAVENOUS
Status: CANCELLED | OUTPATIENT
Start: 2022-08-11

## 2022-08-11 RX ORDER — KETOROLAC TROMETHAMINE 30 MG/ML
30 INJECTION, SOLUTION INTRAMUSCULAR; INTRAVENOUS ONCE
Status: COMPLETED | OUTPATIENT
Start: 2022-08-11 | End: 2022-08-11

## 2022-08-11 RX ORDER — KETOROLAC TROMETHAMINE 30 MG/ML
30 INJECTION, SOLUTION INTRAMUSCULAR; INTRAVENOUS ONCE
Status: CANCELLED | OUTPATIENT
Start: 2022-08-11 | End: 2022-08-11

## 2022-08-11 RX ADMIN — SODIUM CHLORIDE 1000 MG: 9 INJECTION, SOLUTION INTRAVENOUS at 13:56

## 2022-08-11 RX ADMIN — KETOROLAC TROMETHAMINE 30 MG: 30 INJECTION, SOLUTION INTRAMUSCULAR; INTRAVENOUS at 12:52

## 2022-08-11 RX ADMIN — ONDANSETRON 4 MG: 2 INJECTION INTRAMUSCULAR; INTRAVENOUS at 12:53

## 2022-08-11 RX ADMIN — SODIUM CHLORIDE 1000 ML: 9 INJECTION, SOLUTION INTRAVENOUS at 12:49

## 2022-08-11 RX ADMIN — SODIUM CHLORIDE 125 MG: 9 INJECTION, SOLUTION INTRAVENOUS at 12:56

## 2022-08-11 ASSESSMENT — PAIN SCALES - GENERAL: PAINLEVEL: SEVERE PAIN (6)

## 2022-08-11 NOTE — TELEPHONE ENCOUNTER
Requested Prescriptions   Pending Prescriptions Disp Refills     betamethasone valerate (VALISONE) 0.1 % external cream 42.5 g 0     Sig: Apply topically 2 times daily       There is no refill protocol information for this order        Last Written Prescription Date:  04/12/22  Last Fill Quantity: 42.5g,  # refills: 0   Last office visit: 6/7/2022 with prescribing provider:  Enadeghe   Future Office Visit: none found    Refill approved  Monserrat Bingham RN on 8/11/2022 at 4:10 PM
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
## 2022-08-22 LAB
ABO/RH(D): NORMAL
ANTIBODY SCREEN: NEGATIVE
SPECIMEN EXPIRATION DATE: NORMAL

## 2022-08-23 ENCOUNTER — ONCOLOGY VISIT (OUTPATIENT)
Dept: ONCOLOGY | Facility: CLINIC | Age: 37
End: 2022-08-23
Attending: OBSTETRICS & GYNECOLOGY
Payer: COMMERCIAL

## 2022-08-23 ENCOUNTER — PRE VISIT (OUTPATIENT)
Dept: ONCOLOGY | Facility: CLINIC | Age: 37
End: 2022-08-23

## 2022-08-23 VITALS
TEMPERATURE: 97.9 F | DIASTOLIC BLOOD PRESSURE: 81 MMHG | RESPIRATION RATE: 16 BRPM | HEIGHT: 67 IN | OXYGEN SATURATION: 96 % | HEART RATE: 101 BPM | SYSTOLIC BLOOD PRESSURE: 122 MMHG | BODY MASS INDEX: 45.99 KG/M2 | WEIGHT: 293 LBS

## 2022-08-23 DIAGNOSIS — R87.810 CERVICAL HIGH RISK HPV (HUMAN PAPILLOMAVIRUS) TEST POSITIVE: ICD-10-CM

## 2022-08-23 DIAGNOSIS — Z90.711 S/P LAPAROSCOPIC SUPRACERVICAL HYSTERECTOMY: ICD-10-CM

## 2022-08-23 DIAGNOSIS — N95.2 VAGINAL ATROPHY: ICD-10-CM

## 2022-08-23 DIAGNOSIS — R87.810 CERVICAL HIGH RISK HPV (HUMAN PAPILLOMAVIRUS) TEST POSITIVE: Primary | ICD-10-CM

## 2022-08-23 LAB
ALBUMIN SERPL BCG-MCNC: 4.2 G/DL (ref 3.5–5.2)
ALP SERPL-CCNC: 94 U/L (ref 35–104)
ALT SERPL W P-5'-P-CCNC: 47 U/L (ref 10–35)
ANION GAP SERPL CALCULATED.3IONS-SCNC: 9 MMOL/L (ref 7–15)
AST SERPL W P-5'-P-CCNC: 31 U/L (ref 10–35)
BASOPHILS # BLD AUTO: 0 10E3/UL (ref 0–0.2)
BASOPHILS NFR BLD AUTO: 1 %
BILIRUB SERPL-MCNC: 0.2 MG/DL
BUN SERPL-MCNC: 8.6 MG/DL (ref 6–20)
CALCIUM SERPL-MCNC: 9.5 MG/DL (ref 8.6–10)
CHLORIDE SERPL-SCNC: 106 MMOL/L (ref 98–107)
CREAT SERPL-MCNC: 0.9 MG/DL (ref 0.51–0.95)
DEPRECATED HCO3 PLAS-SCNC: 25 MMOL/L (ref 22–29)
EOSINOPHIL # BLD AUTO: 0.1 10E3/UL (ref 0–0.7)
EOSINOPHIL NFR BLD AUTO: 2 %
ERYTHROCYTE [DISTWIDTH] IN BLOOD BY AUTOMATED COUNT: 13.7 % (ref 10–15)
GFR SERPL CREATININE-BSD FRML MDRD: 84 ML/MIN/1.73M2
GLUCOSE SERPL-MCNC: 112 MG/DL (ref 70–99)
HCT VFR BLD AUTO: 46.7 % (ref 35–47)
HGB BLD-MCNC: 14 G/DL (ref 11.7–15.7)
IMM GRANULOCYTES # BLD: 0 10E3/UL
IMM GRANULOCYTES NFR BLD: 0 %
LYMPHOCYTES # BLD AUTO: 1.9 10E3/UL (ref 0.8–5.3)
LYMPHOCYTES NFR BLD AUTO: 27 %
MCH RBC QN AUTO: 26.1 PG (ref 26.5–33)
MCHC RBC AUTO-ENTMCNC: 30 G/DL (ref 31.5–36.5)
MCV RBC AUTO: 87 FL (ref 78–100)
MONOCYTES # BLD AUTO: 0.4 10E3/UL (ref 0–1.3)
MONOCYTES NFR BLD AUTO: 6 %
NEUTROPHILS # BLD AUTO: 4.6 10E3/UL (ref 1.6–8.3)
NEUTROPHILS NFR BLD AUTO: 64 %
NRBC # BLD AUTO: 0 10E3/UL
NRBC BLD AUTO-RTO: 0 /100
PLATELET # BLD AUTO: 291 10E3/UL (ref 150–450)
POTASSIUM SERPL-SCNC: 4.1 MMOL/L (ref 3.4–5.3)
PROT SERPL-MCNC: 6.9 G/DL (ref 6.4–8.3)
RBC # BLD AUTO: 5.37 10E6/UL (ref 3.8–5.2)
SODIUM SERPL-SCNC: 140 MMOL/L (ref 136–145)
WBC # BLD AUTO: 7.1 10E3/UL (ref 4–11)

## 2022-08-23 PROCEDURE — 86850 RBC ANTIBODY SCREEN: CPT | Performed by: OBSTETRICS & GYNECOLOGY

## 2022-08-23 PROCEDURE — 36415 COLL VENOUS BLD VENIPUNCTURE: CPT

## 2022-08-23 PROCEDURE — 80053 COMPREHEN METABOLIC PANEL: CPT | Performed by: OBSTETRICS & GYNECOLOGY

## 2022-08-23 PROCEDURE — 86901 BLOOD TYPING SEROLOGIC RH(D): CPT | Performed by: OBSTETRICS & GYNECOLOGY

## 2022-08-23 PROCEDURE — 99205 OFFICE O/P NEW HI 60 MIN: CPT | Performed by: OBSTETRICS & GYNECOLOGY

## 2022-08-23 PROCEDURE — 85025 COMPLETE CBC W/AUTO DIFF WBC: CPT | Performed by: OBSTETRICS & GYNECOLOGY

## 2022-08-23 PROCEDURE — 82040 ASSAY OF SERUM ALBUMIN: CPT | Performed by: OBSTETRICS & GYNECOLOGY

## 2022-08-23 PROCEDURE — G0463 HOSPITAL OUTPT CLINIC VISIT: HCPCS

## 2022-08-23 ASSESSMENT — PAIN SCALES - GENERAL: PAINLEVEL: NO PAIN (0)

## 2022-08-23 NOTE — PROGRESS NOTES
Medical Assistant Note:  Melinda Bowens presents today for blood draw.    Patient seen by provider today: Yes: Dr. Wilkes.   present during visit today: Not Applicable.    Concerns: No Concerns.    Procedure:  Labs drawn    Post Assessment:  Labs drawn without difficulty: Yes.    Discharge Plan:  Departure Mode: Ambulatory.    Face to Face Time: 10 min.    Brooke Castillo [facility]

## 2022-08-23 NOTE — PROGRESS NOTES
Consult Notes on Referred Patient        Dr. Hilary Salazar MD  [address] [facility] [address]  [place] [address]       RE: Melinda Bowens  : 1985  BETHANY: Aug 23, 2022    Dear Dr. Hilary Donato*:    I had the pleasure of seeing your patient Melinda Bowens here at the Gynecologic Cancer Clinic at the [facility] on 2022.  As you know she is a very pleasant 36 year old woman with a diagnosis of persistent HPV infection s/p supracervical hysterectomy with inadequate colposcopy.  Given these findings she was subsequently sent to the Gynecologic Cancer Clinic for new patient consultation.  She is unaccompanied today.    Patient has a longstanding history of HPV positive pap smears dating back to at least 18. Patient reports this has been ongoing since . She reports that she has lichen sclerosis.  Pelvic examinations are very painful for her.  She reports very thin skin, abrasions, and coaptation of her vulva.  She is currently using clobetasol which helps with flair ups.  Additionally, she uses estrogen cream and suppositories. She had been on HRT after her supracervical hysterectomy and BSO but had significant migraines from this.  She reports that her BSO was done due to endometriosis and PCOS.   She cannot have intercourse without tearing so she is currently not sexually active.  She will notice some bleeding after itching or rubbing from her underwear that she attributes to abrasions.  She denies vaginal bleeding.  She reports normal vaginal discharge, no increase amount or malodor.  She does reports very irregular and heavy menses prior to her hysterectomy.  She has had 4 diagnostic laparoscopies with fulguration for her endometriosis. She does have abdominal and pelvic pain still but she has been attributing this to her IBS since her hysterectomy, BSO.  When her IBS is controlled, she notes alternating constipation and diarrhea. No issues with urination.       22:  Pap:  Negative, HPV 16 and other HR positive    22:  Colposcopy inadequate (no SCJ visualized), ECC non-diagnostic        Obstetrics and Gynecology History:  ,  x2. SAB x1.  See menstrual history above.      Past Medical History:  Past Medical History:   Diagnosis Date     Anxiety      Asthma     childhood     Calculus, kidney 2012     Depressive disorder      Endometriosis of pelvic peritoneum      Hyperlipidemia 2013    Met with CE 2013. Decreased LDL to wnl through lifestyle changes only.      Irritable bowel syndrome      Lichen sclerosus et atrophicus      Migraines     MRI with neurology, normal 3/2013. Starting on new medication, thinks it is topomax.      Morbid obesity with BMI of 45.0-49.9, adult (H)        Past Surgical History:  Past Surgical History:   Procedure Laterality Date      SECTION  ,      CHOLECYSTECTOMY  2008     DILATION AND CURETTAGE  2008, 2009-inactive and secretory endometrium; -secretory endometrium     LAPAROSCOPIC HYSTERECTOMY SUPRACERVICAL, BILATERAL SALPINGO-OOPHORECTOMY, COMBINED  2009    inactive endometrium;bilateral endometriosis in right and left ovaries     LAPAROSCOPY DIAGNOSTIC (GYN)  , ,      TONSILLECTOMY  2010       Health Maintenance:  Last Pap Smear: See HPI    Last Mammogram: N/A    Last Colonoscopy: 09              Result: negative-repeat age 48       Social History:  Lives with , Jorge, and son and daughter, feels safe at home. She has 3 children, youngest was adopted.  Works at a [employer] called [employer] as .  Does not have an advanced directive on file and would like  to be her POA.      Family History:   The patient's family history is significant for.  Family History   Problem Relation Age of Onset     Hyperlipidemia Mother      Hypertension Mother      Thyroid Disease Mother      Diabetes Maternal Grandmother   "    Hyperlipidemia Maternal Grandmother      Hypertension Maternal Grandmother      Heart Disease Maternal Grandmother      Osteoporosis Maternal Grandmother      Thyroid Disease Maternal Grandmother      Diabetes Paternal Grandmother      Heart Disease Paternal Grandfather      Lung Cancer Paternal Grandfather      Hyperlipidemia Other         Aunt, unspecified     Hypertension Other         Aunt, unspecified         Physical Exam:   /81   Pulse 101   Temp 97.9  F (36.6  C) (Tympanic)   Resp 16   Ht 1.702 m (5' 7\")   Wt 138.3 kg (304 lb 14.4 oz)   SpO2 96%   BMI 47.75 kg/m    Body mass index is 47.75 kg/m .    General Appearance: healthy and alert, no distress        Cardiovascular: regular rate and rhythm    Respiratory: lungs clear     Gastrointestinal:       abdomen soft, non-tender, non-distended, no organomegaly or masses    Genitourinary: External genitalia and urethral meatus appears normal.  Vagina is smooth without nodularity or masses.  Cervix cannot be adequately visualized due to patient discomfort. Patient declined bimanual exam    Labs:  WBC 7.1 with ANC 4.6.  Hemoglobin 14.  Platelets 291.  Creatinine 0.9.  Potassium 4.1.  Magnesium -.  Remainder of electrolytes within normal limits.  AST 31, ALT 47, alkaline phosphatase 94, total bilirubin 0.2.  Albumin 4.2.      Assessment:    Melinda Bowens is a 36 year old woman with a diagnosis of persistent HPV infection s/p supracervical hysterectomy with inadequate colposcopy.     A total of 60 minutes was spent on patient care today.       Plan:     1.)    Persistent HPV infection s/p supracervical hysterectomy with inadequate colposcopy-We reviewed the natural history and progression of HPV.  We discussed the inadequate colposcopy and the implications of this.  Given this and the inability to obtain a good ECC sample or further cervical sampling, my recommendation would be for an MRI to ensure no visible malignancy prior to proceeding with " trachelectomy.  We did discuss the possibility of a microscopic cancer at final pathology which is not visible via MRI which may necessitate further procedures.  We did discuss that trachelectomy may not clear HPV infection but that since she has never had documented high grade dysplasia, she could stop screening pap smears if she had trachelectomy.  She very much wishes to proceed with trachelectomy and we will thus obtain MRI and assuming this is negative for overt malignancy, we will proceed.  Risks, benefits, indications and alternatives were reviewed. All her questions were answered and she will undergo robotic removal of cervix, cystoscopy, possible open on 9/22 at [facility]     2.) Genetic risk factors were assessed and the patient does not meet the qualifications for a referral.      3.) Labs and/or tests ordered include:  MRI pelvis, CBC, CMP, T/S.     4.) Health maintenance issues addressed today include pt is up to date.    5.) Pre-op teaching was completed today.        Thank you for allowing us to participate in the care of your patient.         Sincerely,    Mary Beth Wilkes MD  Gynecologic Oncology  [facility]       JEANETTE TORRES

## 2022-08-23 NOTE — LETTER
2022         RE: Melinda Bowens  [address] [facility] [address]        Dear Colleague,    Thank you for referring your patient, Melinda Bowens, to the [facility]. Please see a copy of my visit note below.    Consult Notes on Referred Patient        Dr. Hilary Salazar MD  [address]  [place] [address]       RE: Melinda Bowens  : 1985  BETHANY: Aug 23, 2022    Dear Dr. Hilary Donato*:    I had the pleasure of seeing your patient Melinda Bowens here at the Gynecologic Cancer Clinic at the [facility] on 2022.  As you know she is a very pleasant 36 year old woman with a diagnosis of persistent HPV infection s/p supracervical hysterectomy with inadequate colposcopy.  Given these findings she was subsequently sent to the Gynecologic Cancer Clinic for new patient consultation.  She is unaccompanied today.    Patient has a longstanding history of HPV positive pap smears dating back to at least 16. Patient reports this has been ongoing since . She reports that she has lichen sclerosis.  Pelvic examinations are very painful for her.  She reports very thin skin, abrasions, and coaptation of her vulva.  She is currently using clobetasol which helps with flair ups.  Additionally, she uses estrogen cream and suppositories. She had been on HRT after her supracervical hysterectomy and BSO but had significant migraines from this.  She reports that her BSO was done due to endometriosis and PCOS.   She cannot have intercourse without tearing so she is currently not sexually active.  She will notice some bleeding after itching or rubbing from her underwear that she attributes to abrasions.  She denies vaginal bleeding.  She reports normal vaginal discharge, no increase amount or malodor.  She does reports very irregular and heavy menses prior to her hysterectomy.  She has had 4 diagnostic laparoscopies with  fulguration for her endometriosis. She does have abdominal and pelvic pain still but she has been attributing this to her IBS since her hysterectomy, BSO.  When her IBS is controlled, she notes alternating constipation and diarrhea. No issues with urination.      22:  Pap:  Negative, HPV 16 and other HR positive    22:  Colposcopy inadequate (no SCJ visualized), ECC non-diagnostic        Obstetrics and Gynecology History:  ,  x2. SAB x1.  See menstrual history above.      Past Medical History:  Past Medical History:   Diagnosis Date     Anxiety      Asthma     childhood     Calculus, kidney 2012     Depressive disorder      Endometriosis of pelvic peritoneum      Hyperlipidemia 2013    Met with CE 2013. Decreased LDL to wnl through lifestyle changes only.      Irritable bowel syndrome      Lichen sclerosus et atrophicus      Migraines     MRI with neurology, normal 3/2013. Starting on new medication, thinks it is topomax.      Morbid obesity with BMI of 45.0-49.9, adult (H)        Past Surgical History:  Past Surgical History:   Procedure Laterality Date      SECTION  ,      CHOLECYSTECTOMY  2008     DILATION AND CURETTAGE  2008, 2009-inactive and secretory endometrium; -secretory endometrium     LAPAROSCOPIC HYSTERECTOMY SUPRACERVICAL, BILATERAL SALPINGO-OOPHORECTOMY, COMBINED  2009    inactive endometrium;bilateral endometriosis in right and left ovaries     LAPAROSCOPY DIAGNOSTIC (GYN)  , ,      TONSILLECTOMY  2010       Health Maintenance:  Last Pap Smear: See HPI    Last Mammogram: N/A    Last Colonoscopy: 09              Result: negative-repeat age 48       Social History:  Lives with , Jorge, and son and daughter, feels safe at home. She has 3 children, youngest was adopted.  Works at a [employer] called [employer] as .  Does not have an advanced directive on file and would like  " to be her POA.      Family History:   The patient's family history is significant for.  Family History   Problem Relation Age of Onset     Hyperlipidemia Mother      Hypertension Mother      Thyroid Disease Mother      Diabetes Maternal Grandmother      Hyperlipidemia Maternal Grandmother      Hypertension Maternal Grandmother      Heart Disease Maternal Grandmother      Osteoporosis Maternal Grandmother      Thyroid Disease Maternal Grandmother      Diabetes Paternal Grandmother      Heart Disease Paternal Grandfather      Lung Cancer Paternal Grandfather      Hyperlipidemia Other         Aunt, unspecified     Hypertension Other         Aunt, unspecified         Physical Exam:   /81   Pulse 101   Temp 97.9  F (36.6  C) (Tympanic)   Resp 16   Ht 1.702 m (5' 7\")   Wt 138.3 kg (304 lb 14.4 oz)   SpO2 96%   BMI 47.75 kg/m    Body mass index is 47.75 kg/m .    General Appearance: healthy and alert, no distress        Cardiovascular: regular rate and rhythm    Respiratory: lungs clear     Gastrointestinal:       abdomen soft, non-tender, non-distended, no organomegaly or masses    Genitourinary: External genitalia and urethral meatus appears normal.  Vagina is smooth without nodularity or masses.  Cervix cannot be adequately visualized due to patient discomfort. Patient declined bimanual exam    Labs:  WBC 7.1 with ANC 4.6.  Hemoglobin 14.  Platelets 291.  Creatinine 0.9.  Potassium 4.1.  Magnesium -.  Remainder of electrolytes within normal limits.  AST 31, ALT 47, alkaline phosphatase 94, total bilirubin 0.2.  Albumin 4.2.      Assessment:    Melinda Bowens is a 36 year old woman with a diagnosis of persistent HPV infection s/p supracervical hysterectomy with inadequate colposcopy.     A total of 60 minutes was spent on patient care today.       Plan:     1.)    Persistent HPV infection s/p supracervical hysterectomy with inadequate colposcopy-We reviewed the natural history and progression of " HPV.  We discussed the inadequate colposcopy and the implications of this.  Given this and the inability to obtain a good ECC sample or further cervical sampling, my recommendation would be for an MRI to ensure no visible malignancy prior to proceeding with trachelectomy.  We did discuss the possibility of a microscopic cancer at final pathology which is not visible via MRI which may necessitate further procedures.  We did discuss that trachelectomy may not clear HPV infection but that since she has never had documented high grade dysplasia, she could stop screening pap smears if she had trachelectomy.  She very much wishes to proceed with trachelectomy and we will thus obtain MRI and assuming this is negative for overt malignancy, we will proceed.  Risks, benefits, indications and alternatives were reviewed. All her questions were answered and she will undergo robotic removal of cervix, cystoscopy, possible open on 9/22 at [facility]     2.) Genetic risk factors were assessed and the patient does not meet the qualifications for a referral.      3.) Labs and/or tests ordered include:  MRI pelvis, CBC, CMP, T/S.     4.) Health maintenance issues addressed today include pt is up to date.    5.) Pre-op teaching was completed today.        Thank you for allowing us to participate in the care of your patient.         Sincerely,    Mary Beth Wilkes MD  Gynecologic Oncology  [facility]       JEANETTE TORRES        Again, thank you for allowing me to participate in the care of your patient.        Sincerely,        Anthony Wilkes MD

## 2022-08-23 NOTE — NURSING NOTE
"Oncology Rooming Note    August 23, 2022 8:42 AM   Melinda Bowens is a 36 year old female who presents for:    Chief Complaint   Patient presents with     Oncology Clinic Visit     New Patient     Initial Vitals: /81   Pulse 101   Temp 97.9  F (36.6  C) (Tympanic)   Resp 16   Ht 1.702 m (5' 7\")   Wt 138.3 kg (304 lb 14.4 oz)   SpO2 96%   BMI 47.75 kg/m   Estimated body mass index is 47.75 kg/m  as calculated from the following:    Height as of this encounter: 1.702 m (5' 7\").    Weight as of this encounter: 138.3 kg (304 lb 14.4 oz). Body surface area is 2.56 meters squared.  No Pain (0) Comment: Data Unavailable   No LMP recorded. Patient has had a hysterectomy.  Allergies reviewed: Yes  Medications reviewed: Yes    Medications: Medication refills not needed today.  Pharmacy name entered into [employer]:    [facility] #[id number] - [place], [place] - [address]  [facility] # [id number] - [place], [place] - [address]    Clinical concerns: New Patient      Ewa Engle CMA              "

## 2022-08-24 RX ORDER — METRONIDAZOLE 500 MG/100ML
500 INJECTION, SOLUTION INTRAVENOUS
Status: CANCELLED | OUTPATIENT
Start: 2022-08-24

## 2022-08-24 RX ORDER — PHENAZOPYRIDINE HYDROCHLORIDE 100 MG/1
200 TABLET, FILM COATED ORAL ONCE
Status: CANCELLED | OUTPATIENT
Start: 2022-08-24 | End: 2022-08-24

## 2022-08-24 RX ORDER — HEPARIN SODIUM 10000 [USP'U]/ML
5000 INJECTION, SOLUTION INTRAVENOUS; SUBCUTANEOUS
Status: CANCELLED | OUTPATIENT
Start: 2022-08-24

## 2022-08-24 NOTE — PATIENT INSTRUCTIONS
You have been scheduled for surgery on: 9/22 at [facility]    Diagnosis:  Cervical HPV    The surgical procedure is: robotic removal of cervix, cystoscopy, possible open    Anticipated length of surgery: 2-3 hrs.  You will be in the recovery room for approximately 2-3 hrs after surgery.  Your family will not be able to see you until after you leave the recovery room.    Length of hospital stay:  This is an outpatient, extended stay surgery.  You will be discharged same day if you meet criteria for discharge.  If you have a larger surgical incision, you will need to be in the hospital 2-3 days.  ______________________________________________________________________    Preparation for Surgery:    To Schedule   1.  Labs:  CBC, CMP, T/S, orders placed, please perform today   2.  MRI Pelvis:  Order placed, please perform prior to surgery   3.  Post-operative exam with me 1-2 weeks following surgery      Postoperative Restrictions:  No heavy lifting >20lbs for six weeks, nothing in the vagina (no tampons, no intercourse, no douching) for eight weeks.     Postoperative visit:  Return to clinic 1-2 weeks after surgery for post operative visit.      Please contact the clinic with any questions or concerns.    Mary Beth Wilkes MD  Gynecologic Oncology  [facility]

## 2022-08-25 ENCOUNTER — MYC MEDICAL ADVICE (OUTPATIENT)
Dept: ONCOLOGY | Facility: CLINIC | Age: 37
End: 2022-08-25

## 2022-08-26 NOTE — TELEPHONE ENCOUNTER
Return call to pt late afternoon regarding surgery date.  Pt states she missed call about surgery date on 9/22/2022 with Dr Wilkes although when pt attempted to call back, it would not connect to number. Pt states her  will not be out of town on 9/22 so he will not be available to help pt as needed. Other options per pt include week of 9/26 or Sept 15th although pt is just arriving home from her cruise and not sure if this is the best time for surgery. Pt informed that writer will notify Dr Wilkes and Dora, surgery scheduler with update and call pt back with plan.    Sent staff message to MD and Dora as noted.    Anuja Miller, RN, BSN, OCN

## 2022-08-29 NOTE — TELEPHONE ENCOUNTER
Pt called today and informed that Dr Wilkes has sent message to surgery scheduler Dora and Dora will reach out to pt about new date.  Pt aware of plan.  Pt also instructed per Dr Wilkes about other medications on day of surgery as noted below:    Mary Beth Mccord MD Crookes, Valerie, RN  Usually they can tape it but will depend upon the OR team     Hold pentermine, topamax and maxalt   She can take the depression meds    Pt verbalized understanding and no further questions at this time.    Anuja Miller, RN, BSN, OCN

## 2022-09-06 ENCOUNTER — TELEPHONE (OUTPATIENT)
Dept: ONCOLOGY | Facility: CLINIC | Age: 37
End: 2022-09-06

## 2022-09-06 NOTE — TELEPHONE ENCOUNTER
----- Message from Mary Beth Cruz MD sent at 8/25/2022  9:45 PM CDT -----  I cant do 9/19 because I already have meetings most of the day.     I could do possibly 9/21 if we could get it on at [facility].  The week of 9/26 I am on call so have lots of flexibility.     Dora-Can you please reach out to her to see when we can get it on.  This is not super urgent so no need to squeeze it in.  Thanks  ----- Message -----  From: Anuja Miller, CAMPBELL  Sent: 8/25/2022   5:26 PM CDT  To: Mary Beth Cruz MD, Dora Pham, #    Pt said someone called her about surgery date on 9/22 but did not have correct number to respond.  She now cannot have surgery that day as her  needs to go out of town and would not be available to help her.  Would you ever consider Monday, 9/19?    Pt comes back from her cruise on 9/13 so not sure if you would want it that week?  If not, pt available anytime week of 9/26.    Thanks,  Janett    ----- Message -----  From: Mary Beth Mccord MD  Sent: 8/24/2022   1:04 AM CDT  To: Anuja Stacy, CAMPBELL John    She would like surgery on the week of 9/19.  If we can get a robot on 9/22 that would be great.  Thanks

## 2022-09-06 NOTE — TELEPHONE ENCOUNTER
Received voicemail from Melinda.    She said she wants the week of 9/19 or keep her surgery on 9/22. I couldn't get 9/22 because both robots are being used by the departments who have them assigned.     I left her a VM explaining 9/22 was not confirmed yet due to the robotic availability.     I asked if she could do 9/26 as this is the closest to 9/22 as I can get. Noted 9/22 may be an option but the surgery would not start until close to 3 PM.

## 2022-09-07 ENCOUNTER — HOSPITAL ENCOUNTER (OUTPATIENT)
Dept: MRI IMAGING | Facility: CLINIC | Age: 37
Discharge: HOME | End: 2022-09-07
Attending: OBSTETRICS & GYNECOLOGY | Admitting: OBSTETRICS & GYNECOLOGY
Payer: COMMERCIAL

## 2022-09-07 DIAGNOSIS — R87.810 CERVICAL HIGH RISK HPV (HUMAN PAPILLOMAVIRUS) TEST POSITIVE: ICD-10-CM

## 2022-09-07 PROCEDURE — 72197 MRI PELVIS W/O & W/DYE: CPT

## 2022-09-07 PROCEDURE — 255N000002 HC RX 255 OP 636: Performed by: OBSTETRICS & GYNECOLOGY

## 2022-09-07 PROCEDURE — A9585 GADOBUTROL INJECTION: HCPCS | Performed by: OBSTETRICS & GYNECOLOGY

## 2022-09-07 RX ORDER — GADOBUTROL 604.72 MG/ML
15 INJECTION INTRAVENOUS ONCE
Status: COMPLETED | OUTPATIENT
Start: 2022-09-07 | End: 2022-09-07

## 2022-09-07 RX ADMIN — GADOBUTROL 15 ML: 604.72 INJECTION INTRAVENOUS at 07:43

## 2022-09-08 NOTE — TELEPHONE ENCOUNTER
Received voicemail from Melinda approving 9/26.    RN Care Coordinator: Anuja Miller; [phone]    Surgery is scheduled with Dr. Wilkes on 9/26 at the [facility]  Scheduled per next available    H&P already completed by Surgeon     COVID-19 test: patient to complete an at-home test 1-2 days prior to scheduled date and bring a picture of negative results or call [phone] option # 7 to schedule a PCR test within 4 days of the scheduled date     Post-op: will be scheduled by the clinic    Patient will receive a phone call from pre-admission nurses 1-2 days prior to surgery with arrival and start time.    I left a detailed voicemail regarding the scheduled information and requested a call back. Provided direct line.    Surgery packet was provided by the RNCC during appointment

## 2022-09-12 NOTE — PROGRESS NOTES
Late entry from 8/23/2022:  Met with pt after clinic visit with Dr Wilkes to review surgical education. Labs completed on day of visit and pt will have take at home Covid Antigen test -2 days prior to surgery, take picture of results and bring on day of surgery on phone.      GYN ONC Pre-Op Education - Nursing     Relevant Diagnosis: Cervical HPV    Teaching Topic: Robotic removal of cervix, cystoscopy, possible open, surgery date pending on teach date but scheduled now for 9/26/2022. At [facility].    Teaching Concerns addressed: Yes    Patient demonstrates understanding of the following:   Reason for the appointment, diagnosis and treatment plan:   Yes   Knowledge of proper use of medications and conditions for which they are ordered (with special attention to potential side effects or drug interactions): Yes   Which situations necessitate calling provider and whom to contact: Yes       Nutritional needs and diet plan:  Yes      Pain management techniques:  Yes  Diet:  Yes     Infection Prevention:  Patient demonstrates understanding of the following:   Pre-Op CHG Bathing Instructions: Yes  Surgical procedure site care taught:   Yes   Signs and symptoms of infection taught: Yes     Instructional Materials Used/Given:  [facility] Preparing for Your Surgery  Showering or Bathing before Surgery Instructions & CHG Product (2 bottles)  Home Care after Major Abdominal or Vaginal Surgery  Map  Accommodations Brochure  Tips to Increase the Protein in Your Diet  Lymphedema   Using [employer]  Phone number for [facility]   Copy of Surgical Consent    Comments:  Met with pt for pre-op teaching for surgery on 8/23/2022. Reviewed NPO restrictions prior to surgery with pt (No food or milk products 8 hours prior to surgery; Only clear liquids up to 2 hours prior to surgery i.e., water, black coffee, tea, apple juice).  Also reviewed medications that must be held for at least 7 days prior to surgery (Aspirin, Ibuprofen,  Naproxen, Fish oil/Flax seed oil, Multivitamin/Vit. E, or any other product containing aspirin, or NSAIDs).  Per Dr Wilkes, pt should take her venlafaxine, bupropion, omeprazole, and nortriptylline with a small sip of water on the morning of her surgery.Pt to HOLD pentermine, maxalt, and topamax on the day of surgery per Dr Wilkes.  Pt will need to see Dr Wilkes, 1-2 weeks after her surgery. Pt will need someone to drive her home after surgery, and someone to stay with her for at least 24 hours after she arrives home. Reviewed with pt signs and symptoms that would warrant contacting provider immediately.  Also reviewed lifting restrictions after surgery with pt.and AVS insgtructions.  Pt had the opportunity to ask questions, and all questions were answered to her satisfaction.  Patient verbalized understanding and agreement with plan.  Pt was instructed to call the clinic with any questions, concerns, or worsening symptoms.     Anuja Miller RN, BSN, OCN

## 2022-09-21 ENCOUNTER — MYC MEDICAL ADVICE (OUTPATIENT)
Dept: ONCOLOGY | Facility: CLINIC | Age: 37
End: 2022-09-21

## 2022-09-22 NOTE — PROGRESS NOTES
"Video-Visit Details    Type of service:  Video Visit    Video Start Time (time video started): 9:00     Video End Time (time video stopped): 9:36    Originating Location (pt. Location): Home    Distant Location (provider location):  [facility]     Mode of Communication:  [facility]    PRE SURGICAL WEIGHT LOSS NUTRITION APPOINTMENT    Melinda Bowens  1985  female  [id number]  36 year old    ASSESSMENT    Desired Surgical Procedure: undecided if wants surgery but preparing for surgery if that route for weight loss     REASON FOR VISIT:  Melinda Bowens is a 36 year old year old female presents today for a pre-surgical weight loss follow-up appointment. Patient accompanied by self.    DIAGNOSIS:  Weight Status Obesity Grade III BMI >40    ANTHROPOMETRICS:  Height: 5'7\"  Initial Weight: 314 lbs      Weight last visit: 304 (8/23/2022) clinic record   BMI: 47.6 kg/(m^2).    VITAMINS AND MINERALS:  5000 international unit(s) Vitamin D (AM)  Calcium -not taking as disliked chew  MVI (at night)     MEDICATIONS:  Phentermine and Topamax     NUTRITION HISTORY:  Breakfast: eats when gets to work -apple; banana + water   Snack: Granola bar  Snack: Banana  Eats lunch 1 time per week-veggies, frutis, nuts and cheese-wheat thins and turkey ; 1/2 sandwich and salad (catered lunch)  Supper:Traditional family meals- tacos, chicken, pasta, pizza (6:00-6:30) using kids small plates; taco chicken dip + cantaloupe   Snacks: fruit, crackers, cheese, yogurt, veggies; sugar free jello; cheese sticks  Fluids Consumed: Water-has eliminated soda (tried sip of 's Coke and did not like taste)   Eating slower: Yes  Chewing foods thoroughly: No  Take 20-30 minutes to consume each meal: Yes  Fluids and meals separate by at least 30 minutes: No  Carbonation: no   Caffeine: no   Additional Information:  Patient has not been seen by RD since 4/2022.  Patient is unsure if " wants surgery or not.  Patient having robitoc removal of cervix on 9/26/2022 with Dr. Wilkes and will be off of work for 6 weeks so feels it is a good time to establish new patterns and behaviors with food.  Patient was bringing snacks to work but then wasn't eating them.  Patient went on vacation to the [facility] and felt her body hurt.  Patient has been off phentermine for 3 weeks and states needs refill.  Patient feels like she has been craving soda and chocolate    RD contacted RN/MD regarding refill request for phentermine     PHYSICAL ACTIVITY:  Type: walking -patient will drive to park 5 minutes away as lives on [address] and hurts her hips to walk up the hill     DIAGNOSIS:  Previous Nutrition Diagnosis: Obesity related to long history of self- monitoring deficit and excessive energy intake evidenced by BMI of 49.2 kg/m2  No change, modified below    Previous goals:   Sit and eat dinner -improving   Chew foods to applesauce texture-improving   Start complete multivitamin and mineral at night -met     Current Nutrition Diagnosis: Obesity related to long history of self-monitoring deficit and excessive energy intake as evidenced by BMI of 47.6 kg/m2.    INTERVENTION:  Nutrition Prescription: Recommended energy/nutrient modification.    GOALS:  Eat a protein choice at breakfast  Practice avoiding liquids at meals   Practice eating lunch   Start calcium supplements     Intervention:  - Discussed progress towards previous goals.  - Reinforced importance of making behavior changes in preparation for bariatric surgery.   - Assessed learning needs and learning preferences     NUTRITION MONITORING AND EVALUATION:  Expected patient engagement: fair-good  Patient demonstrated good understanding.     Follow up: Continue to monitor patient closely regarding weight loss and diet.  # of visits needed: 2-3  Cleared by RD: No     TIME SPENT WITH PATIENT: 36 minutes    Hayder Myers, RD, [facility]  [place] [facility] Diet[facility]/Weight Loss [facility]   [phone] (office phone)

## 2022-09-23 ENCOUNTER — VIRTUAL VISIT (OUTPATIENT)
Dept: SURGERY | Facility: CLINIC | Age: 37
End: 2022-09-23
Payer: COMMERCIAL

## 2022-09-23 ENCOUNTER — TELEPHONE (OUTPATIENT)
Dept: SURGERY | Facility: CLINIC | Age: 37
End: 2022-09-23

## 2022-09-23 DIAGNOSIS — E66.01 MORBID OBESITY (H): ICD-10-CM

## 2022-09-23 PROCEDURE — 97803 MED NUTRITION INDIV SUBSEQ: CPT | Mod: GT | Performed by: DIETITIAN, REGISTERED

## 2022-09-23 NOTE — TELEPHONE ENCOUNTER
Per RD - pt has been out of phentermine for about 3 weeks.  Called pt - Informed her that was last seen 6/8/22 by provider and was to RTC in 3 months.  Has appt with provider 11/2/22.  Informed pt it would be up to Dr. Archer to refill.  Patient verbalized understanding and is agreeable to plan.  Makenzie Barnes, MS, RD, RN

## 2022-09-23 NOTE — PATIENT INSTRUCTIONS
Sanjay Lawrence,    It was nice seeing you today.  I hope you have a speedy recovery from your surgery on Monday.  Here are the talking points from today:     Eat a protein choice at breakfast  Practice avoiding liquids at meals   Practice eating lunch   Start calcium supplements (5099-3021 mg per day; take 600 mg 2 times per day or 500 mg 3 times per day 2 hours apart from multivitamin/mineral    Please call [phone] to make any changes to your scheduled RD appointments.    Thanks,    Hayder Ruiz, RD, LD  [facility] Outpatient Dietitian/Weight Loss Clinic   [phone] (office phone)

## 2022-09-26 ENCOUNTER — HOSPITAL ENCOUNTER (OUTPATIENT)
Facility: CLINIC | Age: 37
Discharge: HOME | End: 2022-09-27
Attending: OBSTETRICS & GYNECOLOGY | Admitting: OBSTETRICS & GYNECOLOGY
Payer: COMMERCIAL

## 2022-09-26 ENCOUNTER — ANESTHESIA EVENT (OUTPATIENT)
Dept: SURGERY | Facility: CLINIC | Age: 37
End: 2022-09-26
Payer: COMMERCIAL

## 2022-09-26 ENCOUNTER — ANESTHESIA (OUTPATIENT)
Dept: SURGERY | Facility: CLINIC | Age: 37
End: 2022-09-26
Payer: COMMERCIAL

## 2022-09-26 DIAGNOSIS — G89.18 POST-OP PAIN: Primary | ICD-10-CM

## 2022-09-26 LAB
CREAT SERPL-MCNC: 0.71 MG/DL (ref 0.51–0.95)
GFR SERPL CREATININE-BSD FRML MDRD: >90 ML/MIN/1.73M2
GLUCOSE BLDC GLUCOMTR-MCNC: 108 MG/DL (ref 70–99)
HGB BLD-MCNC: 12.8 G/DL (ref 11.7–15.7)
PLATELET # BLD AUTO: 265 10E3/UL (ref 150–450)
POTASSIUM SERPL-SCNC: 3.9 MMOL/L (ref 3.4–5.3)

## 2022-09-26 PROCEDURE — 250N000009 HC RX 250: Performed by: STUDENT IN AN ORGANIZED HEALTH CARE EDUCATION/TRAINING PROGRAM

## 2022-09-26 PROCEDURE — 120N000002 HC R&B MED SURG/OB UMMC

## 2022-09-26 PROCEDURE — 84132 ASSAY OF SERUM POTASSIUM: CPT | Performed by: STUDENT IN AN ORGANIZED HEALTH CARE EDUCATION/TRAINING PROGRAM

## 2022-09-26 PROCEDURE — 250N000013 HC RX MED GY IP 250 OP 250 PS 637: Performed by: STUDENT IN AN ORGANIZED HEALTH CARE EDUCATION/TRAINING PROGRAM

## 2022-09-26 PROCEDURE — 360N000080 HC SURGERY LEVEL 7, PER MIN: Performed by: OBSTETRICS & GYNECOLOGY

## 2022-09-26 PROCEDURE — 250N000011 HC RX IP 250 OP 636: Performed by: OBSTETRICS & GYNECOLOGY

## 2022-09-26 PROCEDURE — 85049 AUTOMATED PLATELET COUNT: CPT | Performed by: STUDENT IN AN ORGANIZED HEALTH CARE EDUCATION/TRAINING PROGRAM

## 2022-09-26 PROCEDURE — 250N000025 HC SEVOFLURANE, PER MIN: Performed by: OBSTETRICS & GYNECOLOGY

## 2022-09-26 PROCEDURE — 258N000003 HC RX IP 258 OP 636: Performed by: STUDENT IN AN ORGANIZED HEALTH CARE EDUCATION/TRAINING PROGRAM

## 2022-09-26 PROCEDURE — 250N000011 HC RX IP 250 OP 636: Performed by: NURSE ANESTHETIST, CERTIFIED REGISTERED

## 2022-09-26 PROCEDURE — 36415 COLL VENOUS BLD VENIPUNCTURE: CPT | Performed by: STUDENT IN AN ORGANIZED HEALTH CARE EDUCATION/TRAINING PROGRAM

## 2022-09-26 PROCEDURE — 82565 ASSAY OF CREATININE: CPT | Performed by: STUDENT IN AN ORGANIZED HEALTH CARE EDUCATION/TRAINING PROGRAM

## 2022-09-26 PROCEDURE — 250N000011 HC RX IP 250 OP 636

## 2022-09-26 PROCEDURE — 250N000011 HC RX IP 250 OP 636: Performed by: STUDENT IN AN ORGANIZED HEALTH CARE EDUCATION/TRAINING PROGRAM

## 2022-09-26 PROCEDURE — 999N000141 HC STATISTIC PRE-PROCEDURE NURSING ASSESSMENT: Performed by: OBSTETRICS & GYNECOLOGY

## 2022-09-26 PROCEDURE — 82962 GLUCOSE BLOOD TEST: CPT

## 2022-09-26 PROCEDURE — 88307 TISSUE EXAM BY PATHOLOGIST: CPT | Mod: TC | Performed by: OBSTETRICS & GYNECOLOGY

## 2022-09-26 PROCEDURE — 250N000013 HC RX MED GY IP 250 OP 250 PS 637: Performed by: OBSTETRICS & GYNECOLOGY

## 2022-09-26 PROCEDURE — 272N000001 HC OR GENERAL SUPPLY STERILE: Performed by: OBSTETRICS & GYNECOLOGY

## 2022-09-26 PROCEDURE — 85018 HEMOGLOBIN: CPT | Performed by: STUDENT IN AN ORGANIZED HEALTH CARE EDUCATION/TRAINING PROGRAM

## 2022-09-26 PROCEDURE — 99024 POSTOP FOLLOW-UP VISIT: CPT | Performed by: OBSTETRICS & GYNECOLOGY

## 2022-09-26 PROCEDURE — 710N000010 HC RECOVERY PHASE 1, LEVEL 2, PER MIN: Performed by: OBSTETRICS & GYNECOLOGY

## 2022-09-26 PROCEDURE — 710N000012 HC RECOVERY PHASE 2, PER MINUTE: Performed by: OBSTETRICS & GYNECOLOGY

## 2022-09-26 PROCEDURE — 258N000003 HC RX IP 258 OP 636: Performed by: NURSE ANESTHETIST, CERTIFIED REGISTERED

## 2022-09-26 PROCEDURE — 370N000017 HC ANESTHESIA TECHNICAL FEE, PER MIN: Performed by: OBSTETRICS & GYNECOLOGY

## 2022-09-26 RX ORDER — FENTANYL CITRATE 50 UG/ML
25 INJECTION, SOLUTION INTRAMUSCULAR; INTRAVENOUS EVERY 5 MIN PRN
Status: DISCONTINUED | OUTPATIENT
Start: 2022-09-26 | End: 2022-09-26 | Stop reason: HOSPADM

## 2022-09-26 RX ORDER — DEXAMETHASONE SODIUM PHOSPHATE 4 MG/ML
INJECTION, SOLUTION INTRA-ARTICULAR; INTRALESIONAL; INTRAMUSCULAR; INTRAVENOUS; SOFT TISSUE PRN
Status: DISCONTINUED | OUTPATIENT
Start: 2022-09-26 | End: 2022-09-26

## 2022-09-26 RX ORDER — AMOXICILLIN 250 MG
1-2 CAPSULE ORAL 2 TIMES DAILY
Qty: 30 TABLET | Refills: 0 | Status: SHIPPED | OUTPATIENT
Start: 2022-09-26 | End: 2022-11-02

## 2022-09-26 RX ORDER — IBUPROFEN 600 MG/1
600 TABLET, FILM COATED ORAL EVERY 6 HOURS PRN
Qty: 12 TABLET | Refills: 0 | Status: SHIPPED | OUTPATIENT
Start: 2022-09-26 | End: 2022-11-02

## 2022-09-26 RX ORDER — FENTANYL CITRATE 50 UG/ML
INJECTION, SOLUTION INTRAMUSCULAR; INTRAVENOUS PRN
Status: DISCONTINUED | OUTPATIENT
Start: 2022-09-26 | End: 2022-09-26

## 2022-09-26 RX ORDER — PHENAZOPYRIDINE HYDROCHLORIDE 200 MG/1
200 TABLET, FILM COATED ORAL ONCE
Status: COMPLETED | OUTPATIENT
Start: 2022-09-26 | End: 2022-09-26

## 2022-09-26 RX ORDER — SODIUM CHLORIDE, SODIUM LACTATE, POTASSIUM CHLORIDE, CALCIUM CHLORIDE 600; 310; 30; 20 MG/100ML; MG/100ML; MG/100ML; MG/100ML
INJECTION, SOLUTION INTRAVENOUS CONTINUOUS PRN
Status: DISCONTINUED | OUTPATIENT
Start: 2022-09-26 | End: 2022-09-26

## 2022-09-26 RX ORDER — KETOROLAC TROMETHAMINE 30 MG/ML
INJECTION, SOLUTION INTRAMUSCULAR; INTRAVENOUS PRN
Status: DISCONTINUED | OUTPATIENT
Start: 2022-09-26 | End: 2022-09-26

## 2022-09-26 RX ORDER — ONDANSETRON 2 MG/ML
4 INJECTION INTRAMUSCULAR; INTRAVENOUS EVERY 30 MIN PRN
Status: DISCONTINUED | OUTPATIENT
Start: 2022-09-26 | End: 2022-09-27

## 2022-09-26 RX ORDER — PROPOFOL 10 MG/ML
INJECTION, EMULSION INTRAVENOUS CONTINUOUS PRN
Status: DISCONTINUED | OUTPATIENT
Start: 2022-09-26 | End: 2022-09-26

## 2022-09-26 RX ORDER — PROPOFOL 10 MG/ML
INJECTION, EMULSION INTRAVENOUS PRN
Status: DISCONTINUED | OUTPATIENT
Start: 2022-09-26 | End: 2022-09-26

## 2022-09-26 RX ORDER — ONDANSETRON 2 MG/ML
INJECTION INTRAMUSCULAR; INTRAVENOUS PRN
Status: DISCONTINUED | OUTPATIENT
Start: 2022-09-26 | End: 2022-09-26

## 2022-09-26 RX ORDER — ACETAMINOPHEN 325 MG/1
975 TABLET ORAL ONCE
Status: DISCONTINUED | OUTPATIENT
Start: 2022-09-26 | End: 2022-09-27

## 2022-09-26 RX ORDER — FAMOTIDINE 20 MG
1000 TABLET ORAL DAILY
COMMUNITY

## 2022-09-26 RX ORDER — IBUPROFEN 200 MG
800 TABLET ORAL ONCE
Status: COMPLETED | OUTPATIENT
Start: 2022-09-26 | End: 2022-09-26

## 2022-09-26 RX ORDER — ACETAMINOPHEN 325 MG/1
650 TABLET ORAL EVERY 6 HOURS PRN
Qty: 24 TABLET | Refills: 0 | Status: SHIPPED | OUTPATIENT
Start: 2022-09-26

## 2022-09-26 RX ORDER — SCOLOPAMINE TRANSDERMAL SYSTEM 1 MG/1
1 PATCH, EXTENDED RELEASE TRANSDERMAL ONCE
Status: DISCONTINUED | OUTPATIENT
Start: 2022-09-26 | End: 2022-09-27 | Stop reason: HOSPADM

## 2022-09-26 RX ORDER — OXYCODONE HYDROCHLORIDE 5 MG/1
5 TABLET ORAL
Status: DISCONTINUED | OUTPATIENT
Start: 2022-09-26 | End: 2022-09-27

## 2022-09-26 RX ORDER — SODIUM CHLORIDE, SODIUM LACTATE, POTASSIUM CHLORIDE, CALCIUM CHLORIDE 600; 310; 30; 20 MG/100ML; MG/100ML; MG/100ML; MG/100ML
INJECTION, SOLUTION INTRAVENOUS CONTINUOUS
Status: DISCONTINUED | OUTPATIENT
Start: 2022-09-26 | End: 2022-09-27

## 2022-09-26 RX ORDER — HYDROMORPHONE HYDROCHLORIDE 1 MG/ML
0.2 INJECTION, SOLUTION INTRAMUSCULAR; INTRAVENOUS; SUBCUTANEOUS EVERY 5 MIN PRN
Status: DISCONTINUED | OUTPATIENT
Start: 2022-09-26 | End: 2022-09-26 | Stop reason: HOSPADM

## 2022-09-26 RX ORDER — PHENTERMINE HYDROCHLORIDE 37.5 MG/1
TABLET ORAL
Qty: 90 TABLET | Refills: 0 | Status: SHIPPED | OUTPATIENT
Start: 2022-09-26

## 2022-09-26 RX ORDER — SODIUM CHLORIDE, SODIUM LACTATE, POTASSIUM CHLORIDE, CALCIUM CHLORIDE 600; 310; 30; 20 MG/100ML; MG/100ML; MG/100ML; MG/100ML
INJECTION, SOLUTION INTRAVENOUS CONTINUOUS
Status: DISCONTINUED | OUTPATIENT
Start: 2022-09-26 | End: 2022-09-26 | Stop reason: HOSPADM

## 2022-09-26 RX ORDER — OXYCODONE HYDROCHLORIDE 5 MG/1
5-10 TABLET ORAL EVERY 4 HOURS PRN
Qty: 6 TABLET | Refills: 0 | Status: SHIPPED | OUTPATIENT
Start: 2022-09-26 | End: 2022-10-04

## 2022-09-26 RX ORDER — MEPERIDINE HYDROCHLORIDE 25 MG/ML
12.5 INJECTION INTRAMUSCULAR; INTRAVENOUS; SUBCUTANEOUS
Status: DISCONTINUED | OUTPATIENT
Start: 2022-09-26 | End: 2022-09-27

## 2022-09-26 RX ORDER — LIDOCAINE 40 MG/G
CREAM TOPICAL
Status: DISCONTINUED | OUTPATIENT
Start: 2022-09-26 | End: 2022-09-26 | Stop reason: HOSPADM

## 2022-09-26 RX ORDER — HYDROMORPHONE HYDROCHLORIDE 1 MG/ML
0.5 INJECTION, SOLUTION INTRAMUSCULAR; INTRAVENOUS; SUBCUTANEOUS ONCE
Status: COMPLETED | OUTPATIENT
Start: 2022-09-26 | End: 2022-09-26

## 2022-09-26 RX ORDER — NORTRIPTYLINE HYDROCHLORIDE 75 MG/1
75 CAPSULE ORAL AT BEDTIME
Status: DISCONTINUED | OUTPATIENT
Start: 2022-09-27 | End: 2022-09-27 | Stop reason: HOSPADM

## 2022-09-26 RX ORDER — FENTANYL CITRATE 50 UG/ML
25 INJECTION, SOLUTION INTRAMUSCULAR; INTRAVENOUS
Status: DISCONTINUED | OUTPATIENT
Start: 2022-09-26 | End: 2022-09-27

## 2022-09-26 RX ORDER — LIDOCAINE HYDROCHLORIDE 20 MG/ML
INJECTION, SOLUTION INFILTRATION; PERINEURAL PRN
Status: DISCONTINUED | OUTPATIENT
Start: 2022-09-26 | End: 2022-09-26

## 2022-09-26 RX ORDER — METRONIDAZOLE 500 MG/100ML
500 INJECTION, SOLUTION INTRAVENOUS
Status: DISCONTINUED | OUTPATIENT
Start: 2022-09-26 | End: 2022-09-26 | Stop reason: HOSPADM

## 2022-09-26 RX ORDER — ONDANSETRON 4 MG/1
4 TABLET, ORALLY DISINTEGRATING ORAL EVERY 30 MIN PRN
Status: DISCONTINUED | OUTPATIENT
Start: 2022-09-26 | End: 2022-09-27

## 2022-09-26 RX ORDER — HEPARIN SODIUM 5000 [USP'U]/.5ML
5000 INJECTION, SOLUTION INTRAVENOUS; SUBCUTANEOUS
Status: COMPLETED | OUTPATIENT
Start: 2022-09-26 | End: 2022-09-26

## 2022-09-26 RX ORDER — CEFAZOLIN SODIUM/WATER 3 G/30 ML
3 SYRINGE (ML) INTRAVENOUS
Status: COMPLETED | OUTPATIENT
Start: 2022-09-26 | End: 2022-09-26

## 2022-09-26 RX ORDER — OXYCODONE HYDROCHLORIDE 5 MG/1
5 TABLET ORAL EVERY 4 HOURS PRN
Status: DISCONTINUED | OUTPATIENT
Start: 2022-09-26 | End: 2022-09-27

## 2022-09-26 RX ADMIN — ONDANSETRON 4 MG: 2 INJECTION INTRAMUSCULAR; INTRAVENOUS at 16:29

## 2022-09-26 RX ADMIN — MIDAZOLAM 2 MG: 1 INJECTION INTRAMUSCULAR; INTRAVENOUS at 13:16

## 2022-09-26 RX ADMIN — Medication 20 MG: at 13:59

## 2022-09-26 RX ADMIN — PROPOFOL 50 MCG/KG/MIN: 10 INJECTION, EMULSION INTRAVENOUS at 13:24

## 2022-09-26 RX ADMIN — FENTANYL CITRATE 25 MCG: 50 INJECTION, SOLUTION INTRAMUSCULAR; INTRAVENOUS at 15:02

## 2022-09-26 RX ADMIN — SODIUM CHLORIDE, POTASSIUM CHLORIDE, SODIUM LACTATE AND CALCIUM CHLORIDE: 600; 310; 30; 20 INJECTION, SOLUTION INTRAVENOUS at 13:17

## 2022-09-26 RX ADMIN — FENTANYL CITRATE 100 MCG: 50 INJECTION, SOLUTION INTRAMUSCULAR; INTRAVENOUS at 13:21

## 2022-09-26 RX ADMIN — HYDROMORPHONE HYDROCHLORIDE 0.2 MG: 1 INJECTION, SOLUTION INTRAMUSCULAR; INTRAVENOUS; SUBCUTANEOUS at 15:54

## 2022-09-26 RX ADMIN — PHENYLEPHRINE HYDROCHLORIDE 200 MCG: 10 INJECTION INTRAVENOUS at 13:36

## 2022-09-26 RX ADMIN — SCOPALAMINE 1 PATCH: 1 PATCH, EXTENDED RELEASE TRANSDERMAL at 18:55

## 2022-09-26 RX ADMIN — IBUPROFEN 800 MG: 200 TABLET, FILM COATED ORAL at 22:48

## 2022-09-26 RX ADMIN — HYDROMORPHONE HYDROCHLORIDE 0.2 MG: 1 INJECTION, SOLUTION INTRAMUSCULAR; INTRAVENOUS; SUBCUTANEOUS at 15:41

## 2022-09-26 RX ADMIN — PROPOFOL 200 MG: 10 INJECTION, EMULSION INTRAVENOUS at 13:24

## 2022-09-26 RX ADMIN — HEPARIN SODIUM 5000 UNITS: 5000 INJECTION, SOLUTION INTRAVENOUS; SUBCUTANEOUS at 12:37

## 2022-09-26 RX ADMIN — FENTANYL CITRATE 25 MCG: 50 INJECTION, SOLUTION INTRAMUSCULAR; INTRAVENOUS at 15:13

## 2022-09-26 RX ADMIN — ONDANSETRON 4 MG: 2 INJECTION INTRAMUSCULAR; INTRAVENOUS at 14:07

## 2022-09-26 RX ADMIN — LIDOCAINE HYDROCHLORIDE 100 MG: 20 INJECTION, SOLUTION INFILTRATION; PERINEURAL at 13:21

## 2022-09-26 RX ADMIN — HYDROMORPHONE HYDROCHLORIDE 0.2 MG: 1 INJECTION, SOLUTION INTRAMUSCULAR; INTRAVENOUS; SUBCUTANEOUS at 15:23

## 2022-09-26 RX ADMIN — DEXAMETHASONE SODIUM PHOSPHATE 6 MG: 4 INJECTION, SOLUTION INTRA-ARTICULAR; INTRALESIONAL; INTRAMUSCULAR; INTRAVENOUS; SOFT TISSUE at 13:28

## 2022-09-26 RX ADMIN — SUGAMMADEX 300 MG: 100 INJECTION, SOLUTION INTRAVENOUS at 14:34

## 2022-09-26 RX ADMIN — PHENAZOPYRIDINE HYDROCHLORIDE 200 MG: 200 TABLET ORAL at 12:15

## 2022-09-26 RX ADMIN — FENTANYL CITRATE 25 MCG: 50 INJECTION, SOLUTION INTRAMUSCULAR; INTRAVENOUS at 15:07

## 2022-09-26 RX ADMIN — Medication 50 MG: at 13:26

## 2022-09-26 RX ADMIN — HYDROMORPHONE HYDROCHLORIDE 0.2 MG: 1 INJECTION, SOLUTION INTRAMUSCULAR; INTRAVENOUS; SUBCUTANEOUS at 16:31

## 2022-09-26 RX ADMIN — KETOROLAC TROMETHAMINE 30 MG: 30 INJECTION, SOLUTION INTRAMUSCULAR at 14:31

## 2022-09-26 RX ADMIN — PHENYLEPHRINE HYDROCHLORIDE 200 MCG: 10 INJECTION INTRAVENOUS at 13:46

## 2022-09-26 RX ADMIN — METRONIDAZOLE 500 MG: 500 INJECTION, SOLUTION INTRAVENOUS at 13:20

## 2022-09-26 RX ADMIN — HYDROMORPHONE HYDROCHLORIDE 0.5 MG: 1 INJECTION, SOLUTION INTRAMUSCULAR; INTRAVENOUS; SUBCUTANEOUS at 15:59

## 2022-09-26 RX ADMIN — FENTANYL CITRATE 50 MCG: 50 INJECTION, SOLUTION INTRAMUSCULAR; INTRAVENOUS at 14:07

## 2022-09-26 RX ADMIN — Medication 3 G: at 13:18

## 2022-09-26 RX ADMIN — HYDROMORPHONE HYDROCHLORIDE 0.2 MG: 1 INJECTION, SOLUTION INTRAMUSCULAR; INTRAVENOUS; SUBCUTANEOUS at 15:49

## 2022-09-26 RX ADMIN — FENTANYL CITRATE 25 MCG: 50 INJECTION, SOLUTION INTRAMUSCULAR; INTRAVENOUS at 15:18

## 2022-09-26 RX ADMIN — PHENYLEPHRINE HYDROCHLORIDE 200 MCG: 10 INJECTION INTRAVENOUS at 13:24

## 2022-09-26 RX ADMIN — OXYCODONE HYDROCHLORIDE 5 MG: 5 TABLET ORAL at 23:40

## 2022-09-26 ASSESSMENT — ACTIVITIES OF DAILY LIVING (ADL)
ADLS_ACUITY_SCORE: 35

## 2022-09-26 NOTE — OR NURSING
Patient demonstrated a negative at home rapid COVID-19 antigen test result by photo on their smart phone. Anesthesia team and OR circulator made aware of negative result during hand-off.

## 2022-09-26 NOTE — ANESTHESIA PROCEDURE NOTES
Airway       Patient location during procedure: OR       Procedure Start/Stop Times: 9/26/2022 1:29 PM  Staff -        CRNA: Jackeline Shaw APRN CRNA       Performed By: CRNAIndications and Patient Condition       Indications for airway management: jonh-procedural       Induction type:intravenous       Mask difficulty assessment: 1 - vent by mask    Final Airway Details       Final airway type: endotracheal airway       Successful airway: ETT - single  Endotracheal Airway Details        ETT size (mm): 7.0       Cuffed: yes       Cuff volume (mL): 7       Successful intubation technique: video laryngoscopy       VL Blade Size: MAC 3       Grade View of Cords: 1       Adjucts: stylet       Position: Right       Measured from: lips       Secured at (cm): 23       Bite block used: None    Post intubation assessment        Placement verified by: capnometry, equal breath sounds and chest rise        Number of attempts at approach: 1       Secured with: silk tape       Ease of procedure: easy       Dentition: Intact and Unchanged    Medication(s) Administered   Medication Administration Time: 9/26/2022 1:29 PM

## 2022-09-26 NOTE — DISCHARGE INSTRUCTIONS
[facility]  Same-Day Surgery   Adult Discharge Orders & Instructions     For 24 hours after surgery    Get plenty of rest.  A responsible adult must stay with you for at least 24 hours after you leave the hospital.   Do not drive or use heavy equipment.  If you have weakness or tingling, don't drive or use heavy equipment until this feeling goes away.  Do not drink alcohol.  Avoid strenuous or risky activities.  Ask for help when climbing stairs.   You may feel lightheaded.  IF so, sit for a few minutes before standing.  Have someone help you get up.   If you have nausea (feel sick to your stomach): Drink only clear liquids such as apple juice, ginger ale, broth or 7-Up.  Rest may also help.  Be sure to drink enough fluids.  Move to a regular diet as you feel able.  You may have a slight fever. Call the doctor if your fever is over 100 F (37.7 C) (taken under the tongue) or lasts longer than 24 hours.  You may have a dry mouth, a sore throat, muscle aches or trouble sleeping.  These should go away after 24 hours.  Do not make important or legal decisions.   Call your doctor for any of the followin.  Signs of infection (fever, growing tenderness at the surgery site, a large amount of drainage or bleeding, severe pain, foul-smelling drainage, redness, swelling).    2. It has been over 8 to 10 hours since surgery and you are still not able to urinate (pass water).    3.  Headache for over 24 hours.    4.  Numbness, tingling or weakness the day after surgery (if you had spinal anesthesia).  To contact a doctor, call Dr. Wilkinson @ [phone] Gynecology/Oncology Clinic  or:    '   [phone] and ask for the resident on call for Gynecology/Oncology  (answered 24 hours a day)  '   Emergency Department:    [facility]: [phone]       (TTY for hearing impaired: [phone])    [facility]: [phone]       (TTY for hearing impaired: [phone])

## 2022-09-26 NOTE — ANESTHESIA CARE TRANSFER NOTE
Patient: Melinda Bowens    Procedure: Procedure(s):  robotic removal of cervix, cystoscopy       Diagnosis: Cervical high risk HPV (human papillomavirus) test positive [R87.810]  Diagnosis Additional Information: No value filed.    Anesthesia Type:   General     Note:    Oropharynx: oropharynx clear of all foreign objects and spontaneously breathing  Level of Consciousness: awake  Oxygen Supplementation: face mask  Level of Supplemental Oxygen (L/min / FiO2): 5  Independent Airway: airway patency satisfactory and stable  Dentition: dentition unchanged  Vital Signs Stable: post-procedure vital signs reviewed and stable  Report to RN Given: handoff report given  Patient transferred to: PACU    Handoff Report: Identifed the Patient, Identified the Reponsible Provider, Reviewed the pertinent medical history, Discussed the surgical course, Reviewed Intra-OP anesthesia mangement and issues during anesthesia, Set expectations for post-procedure period and Allowed opportunity for questions and acknowledgement of understanding      Vitals:  Vitals Value Taken Time   /55 09/26/22 1448   Temp     Pulse 81 09/26/22 1453   Resp 16 09/26/22 1453   SpO2 99 % 09/26/22 1453   Vitals shown include unvalidated device data.    Electronically Signed By: MARYANNE Sylvester CRNA  September 26, 2022  2:54 PM

## 2022-09-26 NOTE — ANESTHESIA PREPROCEDURE EVALUATION
Anesthesia Pre-Procedure Evaluation    Patient: Melinda Bowens   MRN: [id number] : 1985        Procedure : Procedure(s):  robotic removal of cervix, cystoscopy, possible open          Past Medical History:   Diagnosis Date     Anxiety      Asthma     childhood     Calculus, kidney 2012     Depressive disorder      Endometriosis of pelvic peritoneum      Hyperlipidemia 2013    Met with CE 2013. Decreased LDL to wnl through lifestyle changes only.      Irritable bowel syndrome      Lichen sclerosus et atrophicus      Migraines     MRI with neurology, normal 3/2013. Starting on new medication, thinks it is topomax.      Morbid obesity with BMI of 45.0-49.9, adult (H)      PONV (postoperative nausea and vomiting)       Past Surgical History:   Procedure Laterality Date      SECTION  ,      CHOLECYSTECTOMY  2008     DILATION AND CURETTAGE  2008, 2009-inactive and secretory endometrium; -secretory endometrium     LAPAROSCOPIC HYSTERECTOMY SUPRACERVICAL, BILATERAL SALPINGO-OOPHORECTOMY, COMBINED  2009    inactive endometrium;bilateral endometriosis in right and left ovaries     LAPAROSCOPY DIAGNOSTIC (GYN)  , ,      TONSILLECTOMY  2010      Allergies   Allergen Reactions     Amoxicillin Hives     Penicillins Hives      Social History     Tobacco Use     Smoking status: Never Smoker     Smokeless tobacco: Never Used   Substance Use Topics     Alcohol use: No     Alcohol/week: 0.0 standard drinks      Wt Readings from Last 1 Encounters:   22 138.3 kg (304 lb 14.4 oz)        Anesthesia Evaluation   Pt has had prior anesthetic.     History of anesthetic complications  - PONV.      ROS/MED HX  ENT/Pulmonary:  - neg pulmonary ROS     Neurologic:     (+) migraines,     Cardiovascular:  - neg cardiovascular ROS   (+) Dyslipidemia -----    METS/Exercise Tolerance: >4 METS    Hematologic:  - neg hematologic  ROS     Musculoskeletal:  - neg  musculoskeletal ROS     GI/Hepatic: Comment: No symptoms today    (+) GERD, Asymptomatic on medication,     Renal/Genitourinary:  - neg Renal ROS     Endo:  - neg endo ROS   (+) Obesity,     Psychiatric/Substance Use:     (+) psychiatric history anxiety     Infectious Disease:       Malignancy:       Other:  - neg other ROS          Physical Exam    Airway        Mallampati: II   TM distance: > 3 FB   Neck ROM: full   Mouth opening: > 3 cm    Respiratory Devices and Support         Dental  no notable dental history         Cardiovascular   cardiovascular exam normal          Pulmonary   pulmonary exam normal                OUTSIDE LABS:  CBC:   Lab Results   Component Value Date    WBC 7.1 08/23/2022    WBC 6.9 06/20/2020    HGB 14.0 08/23/2022    HGB 13.7 06/20/2020    HCT 46.7 08/23/2022    HCT 44.1 06/20/2020     08/23/2022     06/20/2020     BMP:   Lab Results   Component Value Date     08/23/2022     06/20/2020    POTASSIUM 4.1 08/23/2022    POTASSIUM 3.9 06/20/2020    CHLORIDE 106 08/23/2022    CHLORIDE 104 06/20/2020    CO2 25 08/23/2022    CO2 30 06/20/2020    BUN 8.6 08/23/2022    BUN 8 06/20/2020    CR 0.90 08/23/2022    CR 0.79 06/20/2020     (H) 08/23/2022    GLC 84 06/20/2020     COAGS: No results found for: PTT, INR, FIBR  POC:   Lab Results   Component Value Date    HCG Negative 06/07/2022     HEPATIC:   Lab Results   Component Value Date    ALBUMIN 4.2 08/23/2022    PROTTOTAL 6.9 08/23/2022    ALT 47 (H) 08/23/2022    AST 31 08/23/2022    ALKPHOS 94 08/23/2022    BILITOTAL 0.2 08/23/2022     OTHER:   Lab Results   Component Value Date    A1C 5.5 02/26/2013    OJ 9.5 08/23/2022    LIPASE 46 08/13/2012       Anesthesia Plan    ASA Status:  2   NPO Status:  NPO Appropriate    Anesthesia Type: General.     - Airway: ETT   Induction: Intravenous.   Maintenance: Balanced.   Techniques and Equipment:     - Airway: Video-Laryngoscope     - Lines/Monitors: 2nd IV      Consents    Anesthesia Plan(s) and associated risks, benefits, and realistic alternatives discussed. Questions answered and patient/representative(s) expressed understanding.     - Discussed: Risks, Benefits and Alternatives for BOTH SEDATION and the PROCEDURE were discussed     - Discussed with:  Patient      - Extended Intubation/Ventilatory Support Discussed: No.      - Patient is DNR/DNI Status: No    Use of blood products discussed: Yes.     - Discussed with: Patient.     Postoperative Care    Pain management: Multi-modal analgesia, IV analgesics.   PONV prophylaxis: Ondansetron (or other 5HT-3), Dexamethasone or Solumedrol, Scopolamine patch     Comments:           H&P reviewed: Unable to attach VIRTUAL H&P to encounter due to EHR limitations. Appropriate H&P reviewed. The physical exam performed by anesthesia during this surgical encounter serves as the physical portion of that virtual H&P.  Any significant changes noted within this preop evaluation.          Di Landry MD

## 2022-09-26 NOTE — OP NOTE
Gynecologic Oncology Operative Report    9/26/2022  Melinda Bowens  [id number]    PREOPERATIVE DIAGNOSIS: HPV positive pap smears, s/p supracervical hysterectomy, minimal intra-vaginal cervix remaining precluding adequate cervical sampling    POSTOPERATIVE DIAGNOSIS: Same, final pathology pending    PROCEDURES: Robotic trachelectomy, cystoscopy    SURGEON: Mary Beth Wileks MD     ASSISTANTS:  Edmundo Dutton MD, Fellow.     ANESTHESIA: General endotracheal     ESTIMATED BLOOD LOSS: 10 cc     IV FLUIDS: 800 cc crystalloid    URINE OUTPUT: 200 cc clear urine     INDICATIONS: Melinda Bowens is a 36 year old has a longstanding history of HPV positive pap smears dating back to at least 7/26/18. Patient reports this has been ongoing since 2015. She reports that she has lichen sclerosis.  Pelvic examinations are very painful for her which make adequate colposcopy impossible.  She had a supracervical hysterectomy, BSO for endometriosis.  Her most recent pap smear was negative but HPV 16 and other were positive.  She had inadequate colposcopy and ECC was non-diagnostic. She also had minimal residual intra-vaginal cervix remaining which precluded cervical diagnostic excisional procedures. She had a pelvic MRI which did not show any signs concerning for malignancy.  Following a thorough discussion of the risks, benefits, indications and alternatives she consented to the above procedure.    FINDINGS: On EUA the patient had normal external genitalia and minimal intra-vaginal cervix remaining. On laparoscopy there was a small amount of residual cervix remaining and the uterus and bilateral adnexa were surgically absent.  On cystoscopy there was no evidence of bladder injury or foreign body and there was brisk efflux noted from the bilateral ureteral orifices.    SPECIMENS:   1.  Cervix    COMPLICATIONS: None.     CONDITION: Stable to PACU.     PROCEDURE:   Consent was reviewed with the patient in the preoperative setting and  confirmed. She received prophylactic antibiotics. In addition, she received heparin for venous thrombosis prevention. The patient was transferred to the operating room and placed in dorsal supine position. General anesthetic was obtained in the usual manner without noted difficulties. The patient was then positioned onto Abilio stirrups and an exam under anesthesia was performed with findings as described above.  The patient was prepped and draped for the above-mentioned procedure and Quinones catheter was then placed under sterile techniques.  Timeout was called at which point the patient's name, procedure and operative site was confirmed by the operative team.     Attention was then turned to the upper abdomen. Initially, an incision was made approximately 3 cm above the umbilicus and the Veress needle introduced through this stab incision. The abdomen was insufflated with an opening pressure of 2 mmHg. The Veress needle was removed. Sites for the da Travis laparoscopic ports were demarcated, total of 5, initiating with this midline incision above the umbilicus and positioned in a semicircular fashion around the upper abdomen. The initial midline 8 mm port was inserted without difficulties, the left 2 lateral 8 mm da Travis ports and the right 5 mm and an 8 mm all under direct visualization without any injury noted to underlying structures. At this point, the patient was placed into steep Trendelenburg. The pelvis was inspected as well as the upper abdomen with findings as noted above. Bowels were packed up into the upper abdomen with gentle traction. At this point, the da Travis was docked onto the ports, all appropriate instruments were inserted. An EEA sizer was placed into the vagina and the cervico-vaginal junction was visualized. The bladder flap was created using cautery down to just below the level of the EEA sizer. We then turned our attention posteriorly and the EEA sizer was repositioned so the rectovaginal  space could be developed. Laterally, there was a small residual cervical branch of the uterine artery remaining which was cauterized and divided with the robotic bipolar cautery. The rest of the parametrial tissue was dissected off of the uterus serially cauterized and divided sharply. A colpotomy was made on the anterior side and carried around to the posterior side of the EEA sizer using the electrocautery. The cervix was removed through the vagina and sent for pathology.  The vaginal cuff was then closed using a v lock suture with excellent hemostasis and reapproximation.   Next, we performed cystoscopy using 30-degree angled scope and noted normal bladder mucosa, no evidence of foreign body and brisk efflux from the bilateral ureteral orifices. At this point, the vaginal balloon was removed from the vagina.  All laparoscopic instruments and ports were now removed and CO2 allowed to escape from the abdomen. Skin was reapproximated with 4-0 Vicryl sutures and Dermabond applied. The patient tolerated the procedure well and was taken to the recovery room in stable condition.    Sponge, lap and needle counts were reported as correct x2 and instrument count was correct x1.     Mary Beth Wilkes MD  Gynecologic Oncology  [facility]

## 2022-09-26 NOTE — H&P
[facility]    History and Physical  Gynecology / Oncology     Date of Admission:  2022    Assessment & Plan   Melinda Bowens is a 36 year old female with a PMH significant for persistent HPV16 infection and inability for adequate colposcopy who presents for planned RA-trachelectomy.     Active Problems:    * No active hospital problems. *      Edmundo Dutton MD    Code Status:   Full    Clinically Significant Risk Factors Present on Admission                        Primary Care Physician   [facility]    Chief Complaint   Planned surgical precedure    History is obtained from the patient    History of Present Illness   Melinda Bowens is a 36 year old female with a PMH significant for persistent HPV16 infection and inability for adequate colposcopy who presents for planned RA-trachelectomy. She reports feeling well.  She has had no changes in medical or surgical history since her visit with Dr. Wilkes.  She denies any recent illnesses or hospitalizations.     Past Medical History    I have reviewed this patient's medical history and updated it with pertinent information if needed.   Past Medical History:   Diagnosis Date     Anxiety      Asthma     childhood     Calculus, kidney 2012     Depressive disorder      Endometriosis of pelvic peritoneum      Hyperlipidemia 2013    Met with CE 2013. Decreased LDL to wnl through lifestyle changes only.      Irritable bowel syndrome      Lichen sclerosus et atrophicus      Migraines     MRI with neurology, normal 3/2013. Starting on new medication, thinks it is topomax.      Morbid obesity with BMI of 45.0-49.9, adult (H)      PONV (postoperative nausea and vomiting)        Past Surgical History   I have reviewed this patient's surgical history and updated it with pertinent information if needed.  Past Surgical History:   Procedure Laterality Date      SECTION  ,       CHOLECYSTECTOMY  02/06/2008     DILATION AND CURETTAGE  09/24/2008, 04/30/2009 9/08-inactive and secretory endometrium; 4/09-secretory endometrium     LAPAROSCOPIC HYSTERECTOMY SUPRACERVICAL, BILATERAL SALPINGO-OOPHORECTOMY, COMBINED  11/11/2009    inactive endometrium;bilateral endometriosis in right and left ovaries     LAPAROSCOPY DIAGNOSTIC (GYN)  2005, 2007, 2009     TONSILLECTOMY  08/2010       Prior to Admission Medications   Prior to Admission Medications   Prescriptions Last Dose Informant Patient Reported? Taking?   COMPOUNDED NON-CONTROLLED SUBSTANCE (CMPD RX) - PHARMACY TO MIX COMPOUNDED MEDICATION Past Month at Unknown time  No Yes   Sig: Bi-est: Estriol 0.4mg, Estradiol 0.1mg. Place 1 gram vaginally at bedtime three times per week with finger   Vitamin D, Cholecalciferol, 25 MCG (1000 UT) CAPS 9/25/2022 at 1000  Yes Yes   Sig: Take 1,000 Units by mouth daily   betamethasone valerate (VALISONE) 0.1 % external cream Past Month at Unknown time  No Yes   Sig: Apply topically 2 times daily   buPROPion (WELLBUTRIN XL) 300 MG 24 hr tablet 9/26/2022 at 1000  Yes Yes   Sig: TAKE 1 TABLET (300 MG) BY MOUTH ONCE DAILY.   conjugated estrogens (PREMARIN) 0.625 MG/GM vaginal cream Past Month at Unknown time  No Yes   Sig: Place 1 g vaginally At Bedtime For 30 nights, then three times per week thereafter. Use finger for application.   nortriptyline (PAMELOR) 75 MG capsule 9/25/2022 at 2200  Yes Yes   Sig: TAKE 1 CAPSULE BY MOUTH EVERYDAY AT BEDTIME   omeprazole (PRILOSEC) 40 MG DR capsule 9/25/2022 at 2200  Yes Yes   Sig: Take 40 mg by mouth   ondansetron (ZOFRAN-ODT) 4 MG ODT tab Past Month at Unknown time  Yes Yes   phentermine (ADIPEX-P) 37.5 MG tablet 9/26/2022 at 1000  No Yes   Sig: Take 1 tablet every morning with breakfast.   rizatriptan (MAXALT) 10 MG tablet Past Week at Unknown time  Yes Yes   Sig: TAKE 1 TAB BY MOUTH AT ONSET OF MIGRAINE.MAY REPEAT IN 2 HOURS.MAX 2TABS/24HR   topiramate (TOPAMAX) 50  MG tablet 2022 at 2200  No Yes   Si/2 tablet in the evening. You may increase to 1 full tablet after 1 week.   traZODone (DESYREL) 100 MG tablet 2022 at 2200  Yes Yes   Sig: Take 100 mg by mouth At Bedtime   venlafaxine (EFFEXOR) 75 MG tablet 2022 at 2200  Yes Yes   Sig: TAKE 1 TABLET BY MOUTH TWICE A DAY      Facility-Administered Medications: None     Allergies   Allergies   Allergen Reactions     Amoxicillin Hives     Penicillins Hives       Social History   I have reviewed this patient's social history and updated it with pertinent information if needed. Melinda Bowens  reports that she has never smoked. She has never used smokeless tobacco. She reports that she does not drink alcohol and does not use drugs.    Family History   I have reviewed this patient's family history and updated it with pertinent information if needed.   Family History   Problem Relation Age of Onset     Hyperlipidemia Mother      Hypertension Mother      Thyroid Disease Mother      Diabetes Maternal Grandmother      Hyperlipidemia Maternal Grandmother      Hypertension Maternal Grandmother      Heart Disease Maternal Grandmother      Osteoporosis Maternal Grandmother      Thyroid Disease Maternal Grandmother      Diabetes Paternal Grandmother      Heart Disease Paternal Grandfather      Lung Cancer Paternal Grandfather      Hyperlipidemia Other         Aunt, unspecified     Hypertension Other         Aunt, unspecified       Review of Systems   The 10 point Review of Systems is negative other than noted in the HPI or here.     Physical Exam   Temp: 98.3  F (36.8  C) Temp src: Oral BP: 134/84 Pulse: 85   Resp: 18 SpO2: 99 %      Vital Signs with Ranges  Temp:  [98.3  F (36.8  C)] 98.3  F (36.8  C)  Pulse:  [85] 85  Resp:  [18] 18  BP: (134)/(84) 134/84  SpO2:  [99 %] 99 %  0 lbs 0 oz    Constitutional: No acute distress  Respiratory: CTAB  Cardiovascular: RRR  GI: soft and non tender   Genitourinary: deferred  Skin: no  obvious rashes or lesions  Musculoskeletal: grossly normal  Neurologic: grossly normal  Neuropsychiatric: grossly normal    Data   I personally reviewed no images or EKG's today.  Most Recent 3 CBC's:Recent Labs   Lab Test 09/26/22  1225 08/23/22  1101 06/20/20  1339   WBC  --  7.1 6.9   HGB 12.8 14.0 13.7   MCV  --  87 87    291 298     Most Recent 3 BMP's:Recent Labs   Lab Test 09/26/22  1142 08/23/22  1101 06/20/20  1339   NA  --  140 140   POTASSIUM  --  4.1 3.9   CHLORIDE  --  106 104   CO2  --  25 30   BUN  --  8.6 8   CR  --  0.90 0.79   ANIONGAP  --  9 6   OJ  --  9.5 8.9   * 112* 84

## 2022-09-27 VITALS
BODY MASS INDEX: 48.08 KG/M2 | TEMPERATURE: 96.9 F | OXYGEN SATURATION: 96 % | WEIGHT: 293 LBS | DIASTOLIC BLOOD PRESSURE: 51 MMHG | HEART RATE: 74 BPM | RESPIRATION RATE: 18 BRPM | SYSTOLIC BLOOD PRESSURE: 108 MMHG

## 2022-09-27 PROBLEM — G89.18 POST-OP PAIN: Status: ACTIVE | Noted: 2022-09-27

## 2022-09-27 LAB
ANION GAP SERPL CALCULATED.3IONS-SCNC: 11 MMOL/L (ref 7–15)
BASOPHILS # BLD AUTO: 0 10E3/UL (ref 0–0.2)
BASOPHILS NFR BLD AUTO: 0 %
BUN SERPL-MCNC: 6.6 MG/DL (ref 6–20)
CALCIUM SERPL-MCNC: 8.9 MG/DL (ref 8.6–10)
CHLORIDE SERPL-SCNC: 107 MMOL/L (ref 98–107)
CREAT SERPL-MCNC: 0.61 MG/DL (ref 0.51–0.95)
DEPRECATED HCO3 PLAS-SCNC: 15 MMOL/L (ref 22–29)
EOSINOPHIL # BLD AUTO: 0 10E3/UL (ref 0–0.7)
EOSINOPHIL NFR BLD AUTO: 0 %
ERYTHROCYTE [DISTWIDTH] IN BLOOD BY AUTOMATED COUNT: 13.2 % (ref 10–15)
GFR SERPL CREATININE-BSD FRML MDRD: >90 ML/MIN/1.73M2
GLUCOSE BLDC GLUCOMTR-MCNC: 118 MG/DL (ref 70–99)
GLUCOSE SERPL-MCNC: 121 MG/DL (ref 70–99)
HCT VFR BLD AUTO: 40.2 % (ref 35–47)
HGB BLD-MCNC: 12.5 G/DL (ref 11.7–15.7)
IMM GRANULOCYTES # BLD: 0 10E3/UL
IMM GRANULOCYTES NFR BLD: 0 %
LYMPHOCYTES # BLD AUTO: 1 10E3/UL (ref 0.8–5.3)
LYMPHOCYTES NFR BLD AUTO: 12 %
MCH RBC QN AUTO: 26.4 PG (ref 26.5–33)
MCHC RBC AUTO-ENTMCNC: 31.1 G/DL (ref 31.5–36.5)
MCV RBC AUTO: 85 FL (ref 78–100)
MONOCYTES # BLD AUTO: 0.4 10E3/UL (ref 0–1.3)
MONOCYTES NFR BLD AUTO: 5 %
NEUTROPHILS # BLD AUTO: 6.7 10E3/UL (ref 1.6–8.3)
NEUTROPHILS NFR BLD AUTO: 83 %
NRBC # BLD AUTO: 0 10E3/UL
NRBC BLD AUTO-RTO: 0 /100
PLATELET # BLD AUTO: 257 10E3/UL (ref 150–450)
POTASSIUM SERPL-SCNC: 4.6 MMOL/L (ref 3.4–5.3)
RBC # BLD AUTO: 4.74 10E6/UL (ref 3.8–5.2)
SODIUM SERPL-SCNC: 133 MMOL/L (ref 136–145)
WBC # BLD AUTO: 8.2 10E3/UL (ref 4–11)

## 2022-09-27 PROCEDURE — 250N000013 HC RX MED GY IP 250 OP 250 PS 637

## 2022-09-27 PROCEDURE — 36415 COLL VENOUS BLD VENIPUNCTURE: CPT

## 2022-09-27 PROCEDURE — 80048 BASIC METABOLIC PNL TOTAL CA: CPT

## 2022-09-27 PROCEDURE — 99024 POSTOP FOLLOW-UP VISIT: CPT | Performed by: OBSTETRICS & GYNECOLOGY

## 2022-09-27 PROCEDURE — 85025 COMPLETE CBC W/AUTO DIFF WBC: CPT

## 2022-09-27 RX ORDER — OXYCODONE HYDROCHLORIDE 10 MG/1
10 TABLET ORAL EVERY 4 HOURS PRN
Status: DISCONTINUED | OUTPATIENT
Start: 2022-09-27 | End: 2022-09-27 | Stop reason: HOSPADM

## 2022-09-27 RX ORDER — AMOXICILLIN 250 MG
2 CAPSULE ORAL 2 TIMES DAILY
Status: DISCONTINUED | OUTPATIENT
Start: 2022-09-27 | End: 2022-09-27 | Stop reason: HOSPADM

## 2022-09-27 RX ORDER — CALCIUM CARBONATE 500 MG/1
500 TABLET, CHEWABLE ORAL 4 TIMES DAILY PRN
Status: DISCONTINUED | OUTPATIENT
Start: 2022-09-27 | End: 2022-09-27 | Stop reason: HOSPADM

## 2022-09-27 RX ORDER — HYDROXYZINE HYDROCHLORIDE 25 MG/1
25 TABLET, FILM COATED ORAL EVERY 6 HOURS PRN
Status: DISCONTINUED | OUTPATIENT
Start: 2022-09-27 | End: 2022-09-27 | Stop reason: HOSPADM

## 2022-09-27 RX ORDER — FAMOTIDINE 20 MG/1
20 TABLET, FILM COATED ORAL 2 TIMES DAILY
Status: DISCONTINUED | OUTPATIENT
Start: 2022-09-27 | End: 2022-09-27 | Stop reason: HOSPADM

## 2022-09-27 RX ORDER — BUPROPION HYDROCHLORIDE 300 MG/1
300 TABLET ORAL DAILY
Status: DISCONTINUED | OUTPATIENT
Start: 2022-09-27 | End: 2022-09-27 | Stop reason: HOSPADM

## 2022-09-27 RX ORDER — SIMETHICONE 80 MG
80 TABLET,CHEWABLE ORAL 4 TIMES DAILY PRN
Status: DISCONTINUED | OUTPATIENT
Start: 2022-09-27 | End: 2022-09-27 | Stop reason: HOSPADM

## 2022-09-27 RX ORDER — AMOXICILLIN 250 MG
1 CAPSULE ORAL 2 TIMES DAILY
Status: DISCONTINUED | OUTPATIENT
Start: 2022-09-27 | End: 2022-09-27 | Stop reason: HOSPADM

## 2022-09-27 RX ORDER — NALOXONE HYDROCHLORIDE 0.4 MG/ML
0.2 INJECTION, SOLUTION INTRAMUSCULAR; INTRAVENOUS; SUBCUTANEOUS
Status: DISCONTINUED | OUTPATIENT
Start: 2022-09-27 | End: 2022-09-27 | Stop reason: HOSPADM

## 2022-09-27 RX ORDER — NALOXONE HYDROCHLORIDE 0.4 MG/ML
0.4 INJECTION, SOLUTION INTRAMUSCULAR; INTRAVENOUS; SUBCUTANEOUS
Status: DISCONTINUED | OUTPATIENT
Start: 2022-09-27 | End: 2022-09-27 | Stop reason: HOSPADM

## 2022-09-27 RX ORDER — OXYCODONE HYDROCHLORIDE 5 MG/1
5 TABLET ORAL EVERY 4 HOURS PRN
Status: DISCONTINUED | OUTPATIENT
Start: 2022-09-27 | End: 2022-09-27 | Stop reason: HOSPADM

## 2022-09-27 RX ORDER — VENLAFAXINE 75 MG/1
75 TABLET ORAL 2 TIMES DAILY
Status: DISCONTINUED | OUTPATIENT
Start: 2022-09-27 | End: 2022-09-27 | Stop reason: HOSPADM

## 2022-09-27 RX ORDER — IBUPROFEN 600 MG/1
600 TABLET, FILM COATED ORAL EVERY 6 HOURS
Status: DISCONTINUED | OUTPATIENT
Start: 2022-09-27 | End: 2022-09-27 | Stop reason: HOSPADM

## 2022-09-27 RX ORDER — BISACODYL 10 MG
10 SUPPOSITORY, RECTAL RECTAL DAILY
Status: DISCONTINUED | OUTPATIENT
Start: 2022-09-27 | End: 2022-09-27 | Stop reason: HOSPADM

## 2022-09-27 RX ORDER — LIDOCAINE 40 MG/G
CREAM TOPICAL
Status: DISCONTINUED | OUTPATIENT
Start: 2022-09-27 | End: 2022-09-27 | Stop reason: HOSPADM

## 2022-09-27 RX ORDER — ONDANSETRON 4 MG/1
4 TABLET, ORALLY DISINTEGRATING ORAL EVERY 6 HOURS PRN
Status: DISCONTINUED | OUTPATIENT
Start: 2022-09-27 | End: 2022-09-27 | Stop reason: HOSPADM

## 2022-09-27 RX ORDER — ACETAMINOPHEN 325 MG/1
650 TABLET ORAL EVERY 6 HOURS
Status: DISCONTINUED | OUTPATIENT
Start: 2022-09-27 | End: 2022-09-27 | Stop reason: HOSPADM

## 2022-09-27 RX ADMIN — OXYCODONE 5 MG: 5 TABLET ORAL at 08:33

## 2022-09-27 RX ADMIN — VENLAFAXINE 75 MG: 75 TABLET ORAL at 08:33

## 2022-09-27 RX ADMIN — FAMOTIDINE 20 MG: 20 TABLET ORAL at 08:33

## 2022-09-27 RX ADMIN — IBUPROFEN 600 MG: 600 TABLET ORAL at 11:45

## 2022-09-27 RX ADMIN — NORTRIPTYLINE HYDROCHLORIDE 75 MG: 75 CAPSULE ORAL at 00:21

## 2022-09-27 RX ADMIN — BUPROPION HYDROCHLORIDE 300 MG: 300 TABLET, FILM COATED, EXTENDED RELEASE ORAL at 08:34

## 2022-09-27 RX ADMIN — ACETAMINOPHEN 650 MG: 325 TABLET, FILM COATED ORAL at 14:14

## 2022-09-27 RX ADMIN — IBUPROFEN 600 MG: 600 TABLET ORAL at 04:13

## 2022-09-27 RX ADMIN — SENNOSIDES AND DOCUSATE SODIUM 1 TABLET: 50; 8.6 TABLET ORAL at 08:33

## 2022-09-27 RX ADMIN — ACETAMINOPHEN 650 MG: 325 TABLET, FILM COATED ORAL at 01:33

## 2022-09-27 RX ADMIN — ACETAMINOPHEN 650 MG: 325 TABLET, FILM COATED ORAL at 08:34

## 2022-09-27 RX ADMIN — OXYCODONE HYDROCHLORIDE 10 MG: 10 TABLET ORAL at 13:02

## 2022-09-27 ASSESSMENT — ACTIVITIES OF DAILY LIVING (ADL)
ADLS_ACUITY_SCORE: 31
ADLS_ACUITY_SCORE: 35
ADLS_ACUITY_SCORE: 31
ADLS_ACUITY_SCORE: 31
ADLS_ACUITY_SCORE: 35
ADLS_ACUITY_SCORE: 31
ADLS_ACUITY_SCORE: 31

## 2022-09-27 NOTE — PLAN OF CARE
Goal Outcome Evaluation:  A&Ox4. AVSS. Pt reported bilateral abdominal pain , more on Right side rated 5-6/10; decrease in pain with scheduled tylenol, ibuprofen and PRN oxy. Ate 100% of breakfast; tolerated well. Denied N/V. Scopolamine patch behind R ear. Up to bathroom with SBA, pt was steady, denied feeling dizzy. Voids adequate amount. Pt reported passing gas. No  BM yet.  Continue with POC    Discharge-late entry  Discharge instruction revised with pt; pt stated understanding of discharge instruction. Discharge paper work given to pt. PIV x2 removed. Pt picked discharge meds. Pt left floor via a wheelchair with her belongings escorted by writer. to give pt ride home.

## 2022-09-27 NOTE — OR NURSING
GynOnc resident paged    Patient Melinda Bowens in PACU [place] 2 needs an order for her home medication-  Nortriptyline 75mg.    Thank you,  Minna HIGHTOWER *[phone]

## 2022-09-27 NOTE — PROGRESS NOTES
Admitted/transferred from:   2 RN full skin assessment completed by Felice Almonte, CAMPBELL and Jazmine WILD RN.  Skin assessment findin dermabonded abd lap sites. No other skin issues noted.   Interventions/actions: Educated pt on risks of pressure injuries. Encouraged fluid intake and freq. repositioning    Will continue to monitor.

## 2022-09-27 NOTE — PROGRESS NOTES
Gynecologic Oncology Postoperative Check Note  9/26/2022    S: Patient reports she is doing well postoperatively. Feels tired and like the room is spinning. Pain is okay with current pain regimen. Ambulating without unmanageable pain or lightheadedness. Voiding spontaneously. Tolerating crackers and water without vomiting. Denies chest pain, shortness of breath, dizziness, or other concerns at this time.     O:  Vitals:    09/26/22 1731 09/26/22 1800 09/26/22 1900 09/26/22 1929   BP: 106/72 106/72  107/68   BP Location: Left arm      Pulse: 91   97   Resp: 21 18 16    Temp: 97.6  F (36.4  C)      TempSrc: Oral      SpO2: 94% 94% 92% 93%       Gen: NAD  Cardio: RRR, no murmurs  Resp: CTAB, no wheezing or crackles, requiring 1 LPM by NC  Abdomen: soft, appropriately tender, incision c/d/i  Extremities: Non-tender, trace LE edema    A: 36 year old POD#0 s/p RA trachelectomy. Doing well in the immediate postoperative period. VSS but requiring 1 LPM by NC.    # Post-operative state  - Dz: Persistent HPV infection w/ inadequate colposcopy, s/p supracervical hyst  - Patient not yet meeting post-operative goals for discharge but very motivated to discharge to home tonight.  - Encouraged continued PO intake, reviewed MAR for additional available antiemetics and discussed plan with postop RN.  - Plan to reevaluate in about an hour to determine dispo plan for this evening.      Ilir Mcintosh MD MPH  Obstetrics and Gyncology, PGY-1  September 26, 2022 , 7:55 PM     Addendum as of 09/26/22 at about 2130: Pt continues to have persistent dizziness which is worsened with ambulation. While talking with patient, oxygen desaturation to 88% on RA. No other concerns at this time. Discussed plan to admit overnight for further observation and use of O2 as needed as patient not yet meeting goals for discharge. Patient agreeable with this plan.     Ilir Mcintosh MD, MPH  Ob/Gyn Resident, PGY-2  09/26/22 9:52 PM

## 2022-09-27 NOTE — OR NURSING
GynOnc MD to bedside. Patient still complaining of dizziness when standing. SPO2 91-94% on room air. MD to place admission orders due to failure to meet discharge criteria. Will continue to monitor.

## 2022-09-27 NOTE — DISCHARGE SUMMARY
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  - H/o supracervical hysterectomy  - Migraines    Discharge Dx:  - Same s/p stated procedure    Patient Active Problem List   Diagnosis     Morbid obesity (H)     Cervical high risk HPV (human papillomavirus) test positive     Lichen sclerosus et atrophicus of the vulva     Common migraine     Depression with anxiety     History of gastroesophageal reflux (GERD)     IBS (irritable bowel syndrome)     Seasonal allergies     Post-op pain       Procedures:   - Robot assisted trachelectomy    Prior to Admission Medications:  Medications Prior to Admission   Medication Sig Dispense Refill Last Dose     betamethasone valerate (VALISONE) 0.1 % external cream Apply topically 2 times daily 42.5 g 0 Past Month at Unknown time     buPROPion (WELLBUTRIN XL) 300 MG 24 hr tablet TAKE 1 TABLET (300 MG) BY MOUTH ONCE DAILY.   9/26/2022 at 1000     COMPOUNDED NON-CONTROLLED SUBSTANCE (CMPD RX) - PHARMACY TO MIX COMPOUNDED MEDICATION Bi-est: Estriol 0.4mg, Estradiol 0.1mg. Place 1 gram vaginally at bedtime three times per week with finger 60 g 1 Past Month at Unknown time     conjugated estrogens (PREMARIN) 0.625 MG/GM vaginal cream Place 1 g vaginally At Bedtime For 30 nights, then three times per week thereafter. Use finger for application. 30 g 3 Past Month at Unknown time     nortriptyline (PAMELOR) 75 MG capsule TAKE 1 CAPSULE BY MOUTH EVERYDAY AT BEDTIME   9/25/2022 at 2200     omeprazole (PRILOSEC) 40 MG DR capsule Take 40 mg by mouth   9/25/2022 at 2200     ondansetron (ZOFRAN-ODT) 4 MG ODT tab    Past Month at Unknown time     phentermine (ADIPEX-P) 37.5 MG tablet Take 1 tablet every morning with breakfast. 90 tablet 0 9/26/2022 at 1000     rizatriptan (MAXALT) 10 MG tablet TAKE 1 TAB BY  MOUTH AT ONSET OF MIGRAINE.MAY REPEAT IN 2 HOURS.MAX 2TABS/24HR   Past Week at Unknown time     topiramate (TOPAMAX) 50 MG tablet 1/2 tablet in the evening. You may increase to 1 full tablet after 1 week. 90 tablet 1 9/25/2022 at 2200     traZODone (DESYREL) 100 MG tablet Take 100 mg by mouth At Bedtime   9/25/2022 at 2200     venlafaxine (EFFEXOR) 75 MG tablet TAKE 1 TABLET BY MOUTH TWICE A DAY   9/25/2022 at 2200     Vitamin D, Cholecalciferol, 25 MCG (1000 UT) CAPS Take 1,000 Units by mouth daily   9/25/2022 at 1000       Discharge Medications:     Review of your medicines      START taking      Dose / Directions   acetaminophen 325 MG tablet  Commonly known as: TYLENOL      Dose: 650 mg  Take 2 tablets (650 mg) by mouth every 6 hours as needed for mild pain  Quantity: 24 tablet  Refills: 0     ibuprofen 600 MG tablet  Commonly known as: ADVIL/MOTRIN      Dose: 600 mg  Take 1 tablet (600 mg) by mouth every 6 hours as needed for other (mile and/or inflammatory pain.)  Quantity: 12 tablet  Refills: 0     oxyCODONE 5 MG tablet  Commonly known as: ROXICODONE      Dose: 5-10 mg  Take 1-2 tablets (5-10 mg) by mouth every 4 hours as needed for moderate to severe pain  Quantity: 6 tablet  Refills: 0     senna-docusate 8.6-50 MG tablet  Commonly known as: SENOKOT-S/PERICOLACE      Dose: 1-2 tablet  Take 1-2 tablets by mouth 2 times daily  Quantity: 30 tablet  Refills: 0        CONTINUE these medicines which have NOT CHANGED      Dose / Directions   betamethasone valerate 0.1 % external cream  Commonly known as: VALISONE  Used for: Lichen sclerosus et atrophicus      Apply topically 2 times daily  Quantity: 42.5 g  Refills: 0     buPROPion 300 MG 24 hr tablet  Commonly known as: WELLBUTRIN XL      TAKE 1 TABLET (300 MG) BY MOUTH ONCE DAILY.  Refills: 0     COMPOUNDED NON-CONTROLLED SUBSTANCE - PHARMACY TO MIX COMPOUNDED MEDICATION  Commonly known as: CMPD RX  Used for: Vaginal atrophy      Bi-est: Estriol 0.4mg, Estradiol  0.1mg. Place 1 gram vaginally at bedtime three times per week with finger  Quantity: 60 g  Refills: 1     conjugated estrogens 0.625 MG/GM vaginal cream  Commonly known as: PREMARIN  Used for: Vaginal atrophy      Dose: 1 g  Place 1 g vaginally At Bedtime For 30 nights, then three times per week thereafter. Use finger for application.  Quantity: 30 g  Refills: 3     nortriptyline 75 MG capsule  Commonly known as: PAMELOR      TAKE 1 CAPSULE BY MOUTH EVERYDAY AT BEDTIME  Refills: 0     omeprazole 40 MG DR capsule  Commonly known as: priLOSEC      Dose: 40 mg  Take 40 mg by mouth  Refills: 0     ondansetron 4 MG ODT tab  Commonly known as: ZOFRAN ODT      Refills: 0     phentermine 37.5 MG tablet  Commonly known as: ADIPEX-P  Used for: Morbid obesity (H)      Take 1 tablet every morning with breakfast.  Quantity: 90 tablet  Refills: 0     rizatriptan 10 MG tablet  Commonly known as: MAXALT      TAKE 1 TAB BY MOUTH AT ONSET OF MIGRAINE.MAY REPEAT IN 2 HOURS.MAX 2TABS/24HR  Refills: 0     topiramate 50 MG tablet  Commonly known as: Topamax  Used for: Morbid obesity (H), History of migraine headaches      1/2 tablet in the evening. You may increase to 1 full tablet after 1 week.  Quantity: 90 tablet  Refills: 1     traZODone 100 MG tablet  Commonly known as: DESYREL      Dose: 100 mg  Take 100 mg by mouth At Bedtime  Refills: 0     venlafaxine 75 MG tablet  Commonly known as: EFFEXOR      TAKE 1 TABLET BY MOUTH TWICE A DAY  Refills: 0     Vitamin D (Cholecalciferol) 25 MCG (1000 UT) Caps      Dose: 1,000 Units  Take 1,000 Units by mouth daily  Refills: 0           Where to get your medicines      These medications were sent to [facility] - [place], [place] - [address]  [address] [address]    Phone: [phone]     acetaminophen 325 MG tablet    ibuprofen 600 MG tablet    oxyCODONE 5 MG tablet    senna-docusate 8.6-50 MG tablet         Consultations:  None    Brief History  of Illness:  From H&P: Melinda Bowens is a 36 year old female with a PMH significant for persistent HPV16 infection and inability for adequate colposcopy who presents for planned RA-trachelectomy. She reports feeling well.  She has had no changes in medical or surgical history since her visit with Dr. Wilkes.  She denies any recent illnesses or hospitalizations.     Hospital Course:  Dz:   - Preoperative diagnosis was persistent HPV16 infection, inability for adequate colposcopy. She will follow-up postoperatively for a care plan.  FEN:   - She was maintained on IVF pre and intraoperatively. Postoperatively, her diet was advanced. IVF were discontinued once tolerating adequate PO intake.  By discharge, she was tolerating a regular diet without nausea and vomiting and able to maintain her hydration without IVF supplementation.  Pain:   - Her pain was initially controlled on IV pain medications. Once tolerating PO pain meds, she was transitioned to a PO pain regimen.  Her pain was well controlled on this and she was discharged home with these medications.  CV:   - She has no history of CV issues.  Her vital signs were stable while in house and she had no acute CV issues.  PULM:   - She has no history of pulmonary issues.  She was initially given O2 supplementation in to maintain her O2 sats in the immediate postop period. Due to persistent 1L O2 requirement postoperatively, patient was observed overnight and on POD#1 was transitioned off of this without difficulty.  By discharge, her O2 sats were greater than 94% on RA.  She was encouraged to use her bedside IS while in house.  She had no acute pulmonary issues while in house.  HEME:   - In the immediate postoperative period, patient was having dizziness with ambulation. She was observed overnight and on POD#1, was noted to have appropriate drop in her hemoglobin after surgery.  Hgb stable at 12.5 at time of discharge.   GI:   - She was made NPO prior to the  procedure.  On POD#0, her diet was advanced.  At the time of discharge, she was tolerating a regular diet without nausea and vomiting.  She was passing flatus prior to discharge.  She had no acute GI issues while in house.   :    - A welch catheter was placed at the time of the surgery.  Once ambulating unassisted, the welch catheter was removed.  Prior to discharge, the patient was voiding spontaneously without difficulty.  She had no acute  issues while in house.  ID:   - The patient was AF during her hospitalization.  She received standard preoperative antibiotics without incident.    ENDO:   - No issues  PSYCH/NEURO:   - History of migraines, PTA topamax and nortriptyline continued, PTA trazodone was held and resumed at time of discharge.   PPX:    -  She was given SCDs and IS during her hospital course.  She tolerated these prophylactic interventions without incident.  They were discontinued at the time of her discharge.    Discharge Instructions and Follow up:  Ms. Melinda Bowens was discharged from the hospital with follow up for RA trachelectomy.    Discharge Diet: Regular  Discharge Activity: Lifting restricted to 15 pounds, no driving while on narcotics, nothing per vagina for 6 weeks.  Discharge Follow up: With Dr Wilkes on 10/4 for post operative check     Discharge Disposition:  Discharged to home    Ree Rivas MD  Ob/Gyn PGY-1  09/27/22 12:32 PM      Mary Beth Wilkes MD  Gynecologic Oncology  [facility]

## 2022-09-27 NOTE — PROGRESS NOTES
/57 (BP Location: Left arm, Patient Position: Semi-Hubbard's, Cuff Size: Adult Large)   Pulse 76   Temp (!) 96.4  F (35.8  C) (Oral)   Resp 13   Wt 139.3 kg (307 lb)   SpO2 91%   BMI 48.08 kg/m      Respiratory: WDL on RA Denies SOB  Cardiac: WDL. Denies Chest pain. Dizziness when standing.  Neuro: A&Ox4. CMS intact. Calls appropriately.   GI/: Voiding spontaneously, no BM overnight  Diet: Regular  Skin: 5 lap sites CDI and open to air  Lines/drains: R and L PIV  Pain: Scheduled tylenol and Ibuprofen   Labs: Reviewed.  Activity: SBA  Plan: continue plan of care. Discharge pending labs and dizziness when ambulating.

## 2022-09-27 NOTE — UTILIZATION REVIEW
Inpatient to outpatient Procedure  Admission Status; Secondary Review Determination       Under the authority of the Utilization Management Committee, the utilization review process indicated a secondary review on the above patient. The review outcome is based on review of the medical records, discussions with staff, and applying clinical experience noted on the date of the review.     (x) Outpatient Status with extended recovery is appropriate - This patient does not meet hospital inpatient criteria. If this patient's primary payer is Medicare and was admitted as an inpatient, Condition Code 44 should be used and patient status changed to outpatient recovery.    RATIONALE FOR DETERMINATION   36-year-old female with history of HPV positive Pap smear was admitted to [facility] on 9/26/2022 for robotic trachelectomy and cystoscopy.  Patient had some dizziness and oxygen requirement immediate postop.  However patient is much improved now with plans for discharge later today.  Above procedure is a outpatient procedure. No documented complications or unexpected recovery. Patient can be safely  monitored for bleeding and recover in outpatient/extended recovery setting.   The severity of illness, intensity of service provided, expected LOS and risk for adverse outcome doesn't meet inpatient hospital admission. Dr Rivas notified of this determination.      This document was produced using voice recognition software.     The information on this document is developed by the utilization review team in order for the business office to ensure compliance. This only denotes the appropriateness of proper admission status and does not reflect the quality of care rendered.   The definitions of Inpatient Status and Observation Status used in making the determination above are those provided in the CMS Coverage Manual, Chapter 1 and Chapter 6, section 70.4.     Sincerely,   Christian Bermudez MD    Utilization Review  Physician  Advisor  [facility].

## 2022-09-27 NOTE — OR NURSING
Patient assessed by Gyn/Onc resident at bedside. Still very drowsy and dizzy. Patient was able to urinate. Tolerated fluids and solids. Gyn/Onc to reassess in 1 hour to determine status.

## 2022-09-28 ENCOUNTER — PATIENT OUTREACH (OUTPATIENT)
Dept: ONCOLOGY | Facility: CLINIC | Age: 37
End: 2022-09-28

## 2022-09-28 NOTE — PROGRESS NOTES
Pt s/p Robotic Trachelectomy with Dr Wilkes on 9/26/2022.  Pt admitted overnight due to dizziness with ambulation and oxygen desaturation to 88% on RA. Pt discharged on 9/27/2022.  Pt called and left message to call back with update of status following surgery.  Left message stating if pt is having symptoms with pain or other symptoms, she may call on call provider at [phone] or to call back tomorrow with update as clinic now closed.    Anuja Miller, RN, BSN, OCN

## 2022-09-29 NOTE — PROGRESS NOTES
Second attempt to follow up with pt following surgery.  Left message to call back.    Anuja Miller RN, BSN, OCN

## 2022-09-30 PROCEDURE — 88307 TISSUE EXAM BY PATHOLOGIST: CPT | Mod: 26 | Performed by: PATHOLOGY

## 2022-09-30 NOTE — ANESTHESIA POSTPROCEDURE EVALUATION
Patient: Melinda Bowens    Procedure: Procedure(s):  robotic removal of cervix, cystoscopy       Anesthesia Type:  General    Note:  Disposition: Outpatient   Postop Pain Control: Uneventful            Sign Out: Well controlled pain   PONV: No   Neuro/Psych: Uneventful            Sign Out: Acceptable/Baseline neuro status   Airway/Respiratory: Uneventful            Sign Out: Acceptable/Baseline resp. status   CV/Hemodynamics: Uneventful            Sign Out: Acceptable CV status   Other NRE: NONE   DID A NON-ROUTINE EVENT OCCUR? No           Last vitals:  Vitals Value Taken Time   /68 09/26/22 1700   Temp 36.4  C (97.5  F) 09/26/22 1448   Pulse 93 09/26/22 1700   Resp 16 09/26/22 1700   SpO2 93 % 09/26/22 1719       Electronically Signed By: Di Landry MD  September 30, 2022  6:57 AM

## 2022-10-04 ENCOUNTER — ONCOLOGY VISIT (OUTPATIENT)
Dept: ONCOLOGY | Facility: CLINIC | Age: 37
End: 2022-10-04
Attending: OBSTETRICS & GYNECOLOGY
Payer: COMMERCIAL

## 2022-10-04 VITALS
TEMPERATURE: 97.5 F | SYSTOLIC BLOOD PRESSURE: 116 MMHG | DIASTOLIC BLOOD PRESSURE: 72 MMHG | HEIGHT: 67 IN | BODY MASS INDEX: 45.99 KG/M2 | HEART RATE: 87 BPM | WEIGHT: 293 LBS | RESPIRATION RATE: 16 BRPM | OXYGEN SATURATION: 95 %

## 2022-10-04 DIAGNOSIS — R87.810 CERVICAL HIGH RISK HPV (HUMAN PAPILLOMAVIRUS) TEST POSITIVE: Primary | ICD-10-CM

## 2022-10-04 PROCEDURE — G0463 HOSPITAL OUTPT CLINIC VISIT: HCPCS

## 2022-10-04 PROCEDURE — 99024 POSTOP FOLLOW-UP VISIT: CPT | Performed by: OBSTETRICS & GYNECOLOGY

## 2022-10-04 ASSESSMENT — PAIN SCALES - GENERAL: PAINLEVEL: MODERATE PAIN (4)

## 2022-10-04 NOTE — LETTER
10/4/2022         RE: Melinda Bowens  [id number] [address]        Dear Colleague,    Thank you for referring your patient, Melinda Bowens, to the [facility] [place]. Please see a copy of my visit note below.    Consult Notes on Referred Patient        Dr. Hilary Salazar MD  [address]  [place] [address]       RE: Melinda Bowens  : 1985  BETHANY: Oct 4, 2022    HPI:  Melinda Bowens is 36 year old with benign findings. She is unaccompanied today.  She is recovering as expected from surgery.  Eating and drinking well.  No bowel/bladder concerns with the exception of some urinary frequency.  Minimal pain and not requiring narcotics.  No vaginal bleeding.    Patient has a longstanding history of HPV positive pap smears dating back to at least 16. Patient reports this has been ongoing since . She reports that she has lichen sclerosis.  Pelvic examinations are very painful for her.  She reports very thin skin, abrasions, and coaptation of her vulva.  She is currently using clobetasol which helps with flair ups.  Additionally, she uses estrogen cream and suppositories. She had been on HRT after her supracervical hysterectomy and BSO but had significant migraines from this.  She reports that her BSO was done due to endometriosis and PCOS.   She cannot have intercourse without tearing so she is currently not sexually active.  She will notice some bleeding after itching or rubbing from her underwear that she attributes to abrasions.  She denies vaginal bleeding.  She reports normal vaginal discharge, no increase amount or malodor.  She does reports very irregular and heavy menses prior to her hysterectomy.  She has had 4 diagnostic laparoscopies with fulguration for her endometriosis. She does have abdominal and pelvic pain still but she has been attributing this to her IBS since her hysterectomy, BSO.  When her IBS is controlled,  she notes alternating constipation and diarrhea. No issues with urination.      22:  Pap:  Negative, HPV 16 and other HR positive    22:  Colposcopy inadequate (no SCJ visualized), ECC non-diagnostic    22:  MRI pelvis:  1.  Supracervical hysterectomy. A few incidental nabothian cysts noted. No visible cervical lesion identified. 2.  Bilateral salpingo-oophorectomy. No worrisome adnexal region abnormality.    22:  Robotic trachelectomy, cystoscopy   Pathology:  Benign        Obstetrics and Gynecology History:  ,  x2. SAB x1.  See menstrual history above.      Past Medical History:  Past Medical History:   Diagnosis Date     Anxiety      Asthma     childhood     Calculus, kidney 2012     Depressive disorder      Endometriosis of pelvic peritoneum      Hyperlipidemia 2013    Met with CE 2013. Decreased LDL to wnl through lifestyle changes only.      Irritable bowel syndrome      Lichen sclerosus et atrophicus      Migraines     MRI with neurology, normal 3/2013. Starting on new medication, thinks it is topomax.      Morbid obesity with BMI of 45.0-49.9, adult (H)      PONV (postoperative nausea and vomiting)        Past Surgical History:  Past Surgical History:   Procedure Laterality Date      SECTION  ,      CHOLECYSTECTOMY  2008     DAVINCI LAPAROSCOPIC CERVICECTOMY (TRACHELECTOMY) N/A 2022    Procedure: robotic removal of cervix, cystoscopy;  Surgeon: Mary Beth Mccord MD;  Location: [facility] OR     DILATION AND CURETTAGE  2008, 2009-inactive and secretory endometrium; -secretory endometrium     LAPAROSCOPIC HYSTERECTOMY SUPRACERVICAL, BILATERAL SALPINGO-OOPHORECTOMY, COMBINED  2009    inactive endometrium;bilateral endometriosis in right and left ovaries     LAPAROSCOPY DIAGNOSTIC (GYN)  , , 2009     TONSILLECTOMY  2010       Health Maintenance:  Last Pap Smear: See HPI    Last Mammogram: N/A    Last  "Colonoscopy: 2/19/09              Result: negative-repeat age 48       Social History:  Lives with , Jorge, and son and daughter, feels safe at home. She has 3 children, youngest was adopted.  Works at a [employer] as .  Does not have an advanced directive on file and would like  to be her POA.      Family History:   The patient's family history is significant for.  Family History   Problem Relation Age of Onset     Hyperlipidemia Mother      Hypertension Mother      Thyroid Disease Mother      Diabetes Maternal Grandmother      Hyperlipidemia Maternal Grandmother      Hypertension Maternal Grandmother      Heart Disease Maternal Grandmother      Osteoporosis Maternal Grandmother      Thyroid Disease Maternal Grandmother      Diabetes Paternal Grandmother      Heart Disease Paternal Grandfather      Lung Cancer Paternal Grandfather      Hyperlipidemia Other         Aunt, unspecified     Hypertension Other         Aunt, unspecified         Physical Exam:   /72   Pulse 87   Temp 97.5  F (36.4  C) (Tympanic)   Resp 16   Ht 1.702 m (5' 7\")   Wt 137 kg (302 lb)   SpO2 95%   BMI 47.30 kg/m    Body mass index is 47.3 kg/m .    General Appearance: healthy and alert, no distress     Gastrointestinal:       abdomen soft, non-tender, non-distended, no organomegaly or masses, port sites without erythema/induration or drainage    Genitourinary: External genitalia and urethral meatus appears normal.  Vagina is smooth with a well healing vaginal cuff    Labs:  None      Assessment:    Melinda Bowens is a 36 year old woman with a diagnosis of persistent HPV infection s/p supracervical hysterectomy with inadequate colposcopy.     A total of 15 minutes was spent on patient care today.       Plan:     1.)    Persistent HPV infection s/p supracervical hysterectomy with inadequate colposcopy-We reviewed her surgical findings and pathology which were all benign.  Given " this she  no longer needs follow up with gynecologic oncology.  She also no longer needs screening pap smear exams given she has never had high grade cervical dysplasia.  She should continue to have routine exams including a yearly pelvic exam with her primary gynecologist especially in light of her lichen sclerosus.     2.) Genetic risk factors were assessed and the patient does not meet the qualifications for a referral.      3.) Labs and/or tests ordered include: None     4.) Health maintenance issues addressed today include pt is up to date.        Thank you for allowing us to participate in the care of your patient.         Sincerely,    Mary Beth Wilkes MD  Gynecologic Oncology  [facility]       JEANETTE TORRES        Again, thank you for allowing me to participate in the care of your patient.        Sincerely,        Anthony Wilkes MD

## 2022-10-04 NOTE — PROGRESS NOTES
Consult Notes on Referred Patient        Dr. Hilary Salazar MD  [address] [facility] [address]  [place] [address]       RE: Melinda Bowens  : 1985  BETHANY: Oct 4, 2022    HPI:  Melinda Bowens is 36 year old with benign findings. She is unaccompanied today.  She is recovering as expected from surgery.  Eating and drinking well.  No bowel/bladder concerns with the exception of some urinary frequency.  Minimal pain and not requiring narcotics.  No vaginal bleeding.    Patient has a longstanding history of HPV positive pap smears dating back to at least 16. Patient reports this has been ongoing since . She reports that she has lichen sclerosis.  Pelvic examinations are very painful for her.  She reports very thin skin, abrasions, and coaptation of her vulva.  She is currently using clobetasol which helps with flair ups.  Additionally, she uses estrogen cream and suppositories. She had been on HRT after her supracervical hysterectomy and BSO but had significant migraines from this.  She reports that her BSO was done due to endometriosis and PCOS.   She cannot have intercourse without tearing so she is currently not sexually active.  She will notice some bleeding after itching or rubbing from her underwear that she attributes to abrasions.  She denies vaginal bleeding.  She reports normal vaginal discharge, no increase amount or malodor.  She does reports very irregular and heavy menses prior to her hysterectomy.  She has had 4 diagnostic laparoscopies with fulguration for her endometriosis. She does have abdominal and pelvic pain still but she has been attributing this to her IBS since her hysterectomy, BSO.  When her IBS is controlled, she notes alternating constipation and diarrhea. No issues with urination.      22:  Pap:  Negative, HPV 16 and other HR positive    22:  Colposcopy inadequate (no SCJ visualized), ECC non-diagnostic    22:  MRI pelvis:  1.  Supracervical  hysterectomy. A few incidental nabothian cysts noted. No visible cervical lesion identified. 2.  Bilateral salpingo-oophorectomy. No worrisome adnexal region abnormality.    22:  Robotic trachelectomy, cystoscopy   Pathology:  Benign        Obstetrics and Gynecology History:  ,  x2. SAB x1.  See menstrual history above.      Past Medical History:  Past Medical History:   Diagnosis Date     Anxiety      Asthma     childhood     Calculus, kidney 2012     Depressive disorder      Endometriosis of pelvic peritoneum      Hyperlipidemia 2013    Met with CE 2013. Decreased LDL to wnl through lifestyle changes only.      Irritable bowel syndrome      Lichen sclerosus et atrophicus      Migraines     MRI with neurology, normal 3/2013. Starting on new medication, thinks it is topomax.      Morbid obesity with BMI of 45.0-49.9, adult (H)      PONV (postoperative nausea and vomiting)        Past Surgical History:  Past Surgical History:   Procedure Laterality Date      SECTION  ,      CHOLECYSTECTOMY  2008     DAVINCI LAPAROSCOPIC CERVICECTOMY (TRACHELECTOMY) N/A 2022    Procedure: robotic removal of cervix, cystoscopy;  Surgeon: Mary Beth Mccord MD;  Location: [facility] OR     DILATION AND CURETTAGE  2008, 2009-inactive and secretory endometrium; -secretory endometrium     LAPAROSCOPIC HYSTERECTOMY SUPRACERVICAL, BILATERAL SALPINGO-OOPHORECTOMY, COMBINED  2009    inactive endometrium;bilateral endometriosis in right and left ovaries     LAPAROSCOPY DIAGNOSTIC (GYN)  , ,      TONSILLECTOMY  2010       Health Maintenance:  Last Pap Smear: See HPI    Last Mammogram: N/A    Last Colonoscopy: 09              Result: negative-repeat age 48       Social History:  Lives with , Jorge, and son and daughter, feels safe at home. She has 3 children, youngest was adopted.  Works at a non[employer] called [employer] as  ".  Does not have an advanced directive on file and would like  to be her POA.      Family History:   The patient's family history is significant for.  Family History   Problem Relation Age of Onset     Hyperlipidemia Mother      Hypertension Mother      Thyroid Disease Mother      Diabetes Maternal Grandmother      Hyperlipidemia Maternal Grandmother      Hypertension Maternal Grandmother      Heart Disease Maternal Grandmother      Osteoporosis Maternal Grandmother      Thyroid Disease Maternal Grandmother      Diabetes Paternal Grandmother      Heart Disease Paternal Grandfather      Lung Cancer Paternal Grandfather      Hyperlipidemia Other         Aunt, unspecified     Hypertension Other         Aunt, unspecified         Physical Exam:   /72   Pulse 87   Temp 97.5  F (36.4  C) (Tympanic)   Resp 16   Ht 1.702 m (5' 7\")   Wt 137 kg (302 lb)   SpO2 95%   BMI 47.30 kg/m    Body mass index is 47.3 kg/m .    General Appearance: healthy and alert, no distress     Gastrointestinal:       abdomen soft, non-tender, non-distended, no organomegaly or masses, port sites without erythema/induration or drainage    Genitourinary: External genitalia and urethral meatus appears normal.  Vagina is smooth with a well healing vaginal cuff    Labs:  None      Assessment:    Melinda Bowens is a 36 year old woman with a diagnosis of persistent HPV infection s/p supracervical hysterectomy with inadequate colposcopy.     A total of 15 minutes was spent on patient care today.       Plan:     1.)    Persistent HPV infection s/p supracervical hysterectomy with inadequate colposcopy-We reviewed her surgical findings and pathology which were all benign.  Given this she  no longer needs follow up with gynecologic oncology.  She also no longer needs screening pap smear exams given she has never had high grade cervical dysplasia.  She should continue to have routine exams including a yearly pelvic exam with " her primary gynecologist especially in light of her lichen sclerosus.     2.) Genetic risk factors were assessed and the patient does not meet the qualifications for a referral.      3.) Labs and/or tests ordered include: None     4.) Health maintenance issues addressed today include pt is up to date.        Thank you for allowing us to participate in the care of your patient.         Sincerely,    Mary Beth Wilkes MD  Gynecologic Oncology  [facility]       JEANETTE TORRES

## 2022-10-04 NOTE — NURSING NOTE
"Oncology Rooming Note    October 4, 2022 2:07 PM   Melinda Bowens is a 36 year old female who presents for:    Chief Complaint   Patient presents with     Oncology Clinic Visit     Post Op     Initial Vitals: /72   Pulse 87   Temp 97.5  F (36.4  C) (Tympanic)   Resp 16   Ht 1.702 m (5' 7\")   Wt 137 kg (302 lb)   SpO2 95%   BMI 47.30 kg/m   Estimated body mass index is 47.3 kg/m  as calculated from the following:    Height as of this encounter: 1.702 m (5' 7\").    Weight as of this encounter: 137 kg (302 lb). Body surface area is 2.54 meters squared.  Moderate Pain (4) Comment: Data Unavailable   No LMP recorded. Patient has had a hysterectomy.  Allergies reviewed: Yes  Medications reviewed: Yes    Medications: Medication refills not needed today.  Pharmacy name entered into [facility]:    [id number] - [place], [place] - [address]  [id number] - [place], [place] - [address]    Clinical concerns: f/u       Ewa Engle CMA              "

## 2022-10-26 NOTE — TELEPHONE ENCOUNTER
"Per post op visit with Gyn Onc:  \"She also no longer needs screening pap smear exams given she has never had high grade cervical dysplasia.  She should continue to have routine exams including a yearly pelvic exam with her primary gynecologist especially in light of her lichen sclerosus.\"  "

## 2022-10-27 ENCOUNTER — VIRTUAL VISIT (OUTPATIENT)
Dept: SURGERY | Facility: CLINIC | Age: 37
End: 2022-10-27
Payer: COMMERCIAL

## 2022-10-27 VITALS — WEIGHT: 293 LBS | BODY MASS INDEX: 45.99 KG/M2 | HEIGHT: 67 IN

## 2022-10-27 DIAGNOSIS — E66.01 MORBID OBESITY (H): ICD-10-CM

## 2022-10-27 PROCEDURE — 97803 MED NUTRITION INDIV SUBSEQ: CPT | Mod: GT | Performed by: DIETITIAN, REGISTERED

## 2022-10-27 NOTE — PROGRESS NOTES
"Melinda is a 37 year old who is being evaluated via a billable video visit.      How would you like to obtain your AVS? MyChart  If the video visit is dropped, the invitation should be resent by: Text to cell phone: [phone]  Will anyone else be joining your video visit? No            Video-Visit Details    Video Start Time: 11:27am    Type of service:  Video Visit    Video End Time:12:04pm    Originating Location (pt. Location): Home        Distant Location (provider location):  Off-site    Platform used for Video Visit: [employer] + [employer]      PRE SURGICAL WEIGHT LOSS NUTRITION APPOINTMENT    Melinda Bowens  1985  female  [id number]  37 year old    ASSESSMENT    Desired Surgical Procedure: (undecided; may not pursue surgery)    REASON FOR VISIT:  Melinda Bowens is a 37 year old year old female presents today for a pre-surgical weight loss follow-up appointment. Patient accompanied by self.    DIAGNOSIS:  Weight Status Obesity Grade III BMI >40    ANTHROPOMETRICS:  Height: 67\"  Initial Weight: 314 lbs     Weight last visit: 304 lbs    Current weight: 307 lbs 0 oz  BMI: Body mass index is 48.08 kg/m .    VITAMINS AND MINERALS:  1 Multivitamin with Minerals (HS)  [pt unsure of dose] mg Calcium with Vitamin D (AM)  5000 International units Vitamin D (AM)        MEDICATIONS:  Phentermine and Topamax     NUTRITION HISTORY:  Breakfast: [wakes at 6am or 8am] cheese stick or banana upon waking to take medication  11am - cereal  peanut butter toast  yogurt + granola + berries  Lunch: [2pm] leftovers  chicken + cheese in spinach wrap  Supper: [6pm] chicken + vegetables + rice  pasta  Snacks: pre-bags snacks - nuts, dried fruit, peanut butter pretzels, popcorn   Fluids Consumed: Water (2.5 gallons/day) - gulps and drinks through straw  Eating slower: Yes  Chewing foods thoroughly: Yes  Take 20-30 minutes to consume each meal: usually  Fluids and meals separate by at least 30 minutes: Yes  Carbonation: " none  Caffeine: none  Additional Information: Pt successful in building more consistency around bariatric-specific habits. Pt has been off work for several weeks recovering from surgery. She had many appropriate questions today about enhancing metabolism - stressed the importance of balanced meals at regular intervals, protein intake and strength training. Will send message to provider to explore options for GLP-1 agonists, if appropriate. Note water intake - pt states she is constantly thirsty and has dry mouth since starting meds. Denies polyuria or bothersome frequency of urination. Cautioned on risk of electrolyte imbalance with excessive water intake - will alert provider to address further if needed.     PHYSICAL ACTIVITY:  None; post-op activity restrictions    DIAGNOSIS:  Previous Nutrition Diagnosis: Obesity related to long history of self- monitoring deficit and excessive energy intake evidenced by BMI of 47.6 kg/m2  No change, modified below    Previous goals:   Eat a protein choice at breakfast - improving  Practice avoiding liquids at meals - met  Practice eating lunch - met  Start calcium supplements - partially met    Current Nutrition Diagnosis: Obesity related to long history of self-monitoring deficit and excessive energy intake as evidenced by BMI of 48.08 kg/m2.    INTERVENTION:  Nutrition Prescription: Recommended energy/nutrient modification.    GOALS:  Take Calcium per guidelines (reviewed)  Eat your first meal within 1h of waking, then eat every 4-6 hours  Add strength training 2-3x/week once off exercise restrictions  Continue to practice all pre-op guidelines      Intervention:  - Discussed progress towards previous goals.  - Reinforced importance of making behavior changes in preparation for bariatric surgery.   - Assessed learning needs and learning preferences       NUTRITION MONITORING AND EVALUATION:  Anticipated compliance: fair-good  Patient demonstrated good understanding.      Follow up: Continue to monitor patient closely regarding weight loss and diet.  # of visits needed: 1-2   Cleared by RD: No     TIME SPENT WITH PATIENT: 37 minutes      Francy Hugo RD, LD  Clinical Dietitian

## 2022-10-27 NOTE — PATIENT INSTRUCTIONS
Sanjay Lawrence!      It was great chatting with you again today! Here's a summary of the goals we discussed:    1. Take Calcium per guidelines  - 500mg 3x/day OR 600mg 2x/day  - Take at least 2 hours apart from your Multivitamin    2. Eat balanced meals at regular intervals  - Eat your first meal within 1 hour of waking up, then eat every 4-6 hours  - As discussed, this can be very helpful for supporting your metabolism    3. Add strength training 2-3x/week  - Once you are off post-op activity restrictions, start some consistent strength training  - The more muscle you have, the faster your metabolism is!    4. Continue to practice all pre-op guidelines:  - Eat slowly and chew well  - Separate fluids and meals by 30 minutes  - Avoid caffeine and carbonation      Plan on following up in 1-2 months. This can be scheduled via our call center at . Reach out sooner with any questions or concerns. Have a great day!      Francy Hugo, CELIA, LD?  Clinical Dietitian

## 2022-11-02 ENCOUNTER — VIRTUAL VISIT (OUTPATIENT)
Dept: SURGERY | Facility: CLINIC | Age: 37
End: 2022-11-02
Payer: COMMERCIAL

## 2022-11-02 VITALS — HEIGHT: 67 IN | WEIGHT: 293 LBS | BODY MASS INDEX: 45.99 KG/M2

## 2022-11-02 DIAGNOSIS — Z87.19 HISTORY OF GASTROESOPHAGEAL REFLUX (GERD): ICD-10-CM

## 2022-11-02 DIAGNOSIS — Z86.69 HISTORY OF MIGRAINE HEADACHES: ICD-10-CM

## 2022-11-02 DIAGNOSIS — E78.2 MODERATE MIXED HYPERLIPIDEMIA NOT REQUIRING STATIN THERAPY: ICD-10-CM

## 2022-11-02 DIAGNOSIS — E66.01 MORBID OBESITY (H): Primary | ICD-10-CM

## 2022-11-02 PROCEDURE — 99215 OFFICE O/P EST HI 40 MIN: CPT | Mod: 95 | Performed by: FAMILY MEDICINE

## 2022-11-02 RX ORDER — OXYCODONE AND ACETAMINOPHEN TABLETS 5; 300 MG/1; MG/1
TABLET ORAL
COMMUNITY

## 2022-11-02 NOTE — PATIENT INSTRUCTIONS
Sanjay Lawrence,  It was nice meeting with you today. Please call [phone] to set up a follow up appointment with me in 3 months.  Lisa Archer       Eat Better ? Move More ? Live Well    Eat 3 nutrient-rich meals each day    Don t skip meals--it will cause you to overeat later in the day!    Eating fiber (vegetables/fruits/whole grains) and protein with meals helps you stay full longer    Choose foods with less than 10 grams of sugar and 5 grams of fat per serving to prevent excess calories and weight re-gain  Eat around the same times each day to develop a routine eating schedule   Avoid snacking unless physically hungry.   Planned snacks: 1-2 times per day and no more than 150 calories    Eat protein first   Protein helps with healing, maintaining adequate muscle mass, reducing hunger and optimizing nutritional status   Aim for 60-80 grams of protein per day   Fill up on Fiber   Fiber comes from plants--fruits, veggies, whole grains, nuts/seeds and beans   Fiber is low in calories, high in phytonutrients and helps you stay full longer   Aim for 25-35 grams per day by eating fiber with meals and snacks  Eat S-L-O-W-L-Y   Take 20-30 minutes to eat each meal by taking small bites, chewing foods to applesauce consistency or 20-30 times before you swallow   Eating foods too fast can delay satiety/fullness signals and increase overeating   Slow down your eating by using toddler utensils, putting your fork/spoon down between bites and not watching TV or emailing during meals!   Keep a Journal         Writing down what you eat, how you feel and when you are active helps you identify new changes to work on from week to week         Look for ways to cut 100 calories from your current diet 2-3 times per day  Drink 64 ounces of 0-Calorie drinks between meals   Water   Zero calorie Propel  or Vitamin Water     [employer]  Zero Calories   Crystal Light , Sugar-Free Jose-Aid , and other sugar-free lemonade or flavored  marie   Keep Caffeine to less than 300mg per day ie: 3-6oz cups coffee     Work up to 45-60 minutes of physical activity most days of the week   Helps with losing weight and prevent regaining those extra pounds!    Do a combo of cardio (walking/water exercises) and strength training (lifting weights/Vinyasa yoga)    Avoid Mindless Eating   Be present when you eat--take note of the smell, taste and quality of your food   Make a list of alternative activities you could do to prevent eating out of boredom/stress  Go for a walk, call a friend, chew gum, paint your nails, re-organize the garage, etc

## 2022-11-02 NOTE — PROGRESS NOTES
Melinda is a 37 year old who is being evaluated via a billable video visit.      If the video visit is dropped, the invitation should be resent by: Text to cell phone: [phone]  Will anyone else be joining your video visit? No      Video-Visit Details    Type of service:  Video Visit    Video Start Time: 3:02 pm    Video End Time:3:24 PM    Originating Location (pt. Location): Home    Distant Location (provider location):  [facility] [place]     Platform used for Video Visit: [facility]        Bariatric Care Clinic Non Surgical Follow up Visit   Date of visit: 11/2/2022  Physician: FRANNY Archer MD, MD  Primary Care is [facility] -.  Melinda Bowens   37 year old  female     Initial Weight: 314#  Initial BMI: 49.2  Today's Weight:   Wt Readings from Last 1 Encounters:   11/02/22 302 lb (137 kg)     Body mass index is 47.3 kg/m .           Assessment and Plan   Assessment: Melinda is a 37 year old year old female who presents for medical weight management.      Plan:    1. Morbid obesity (H)  Patient is thinking she may want bariatric surgery and is in the process of preparing for it. She is working on nonsurgical weight loss in the meantime. She wants to continue the phentermine and will try to limit her water intake to 120 oz per day. She can try going down to 1/2 tab if needed. She would benefit from water exercise but her  just lost his job and she can not afford a membership that would provide this. She will walk as much as tolerated.     2. History of gastroesophageal reflux (GERD)  This may improve with healthy habits and weight loss.    3. History of migraine headaches  This may improve with healthy habits and weight loss.    4. Moderate mixed hyperlipidemia not requiring statin therapy  This may improve with healthy habits and weight loss.    Follow up in a few weeks with our dietician and in 3 months with myself           INTERIM  HISTORY  Patient is in the process of preparing for bariatric surgery and is also working on nonsurgical weight loss. She is not completely sure she wants to explore bariatric surgery. She has been very thirsty. She is drinking 240 oz of water per day.     DIETARY HISTORY  Meals Per Day: 3  Eating Protein First?: usually  Food Diary: B:yogurt and fruit or cheese stick or cereal L:leftovers D:protein and vegetables and sometimes starch  Fluid Intake: 240 oz some days  Portion Control: yes    Positive Changes Since Last Visit: decreased pasta, smaller portions, eating slower, increased vegetables, increased water intake, eating 3 meals a day  Struggling With: thirst, exercise    Knowledgeable in Reading Food Labels: yes  Getting Adequate Protein: usually  Sleeping 7-8 hours/day not discussed  Stress management not discussed    PHYSICAL ACTIVITY PATTERNS:  Limited due to surgery at the end of September. She was walking a little more prior to surgery. (hip, knee and low back pain)    REVIEW OF SYSTEMS  GENERAL/CONSTITUTIONAL:  Fatigue: yes  HEENT:   glaucoma: no  CARDIOVASCULAR:  History of heart disease:no  PSYCHIATRIC:  Moods: stable  MUSCULOSKELETAL/RHEUMATOLOGIC  Arthralgias: yes  Myalgias: yes  ENDOCRINE:  Monitoring Blood Sugars: no  Sugars Well Controlled: na  No personal or family history of medullary thyroid cancer no  :  Birth control: hysterectomy       Patient Profile   Social History     Social History Narrative     Not on file        Past Medical History   Past Medical History:   Diagnosis Date     Anxiety      Asthma     childhood     Calculus, kidney 12/2012     Depressive disorder      Endometriosis of pelvic peritoneum      Hyperlipidemia 04/08/2013    Met with CE 4/2013. Decreased LDL to wnl through lifestyle changes only.      Irritable bowel syndrome      Lichen sclerosus et atrophicus      Migraines     MRI with neurology, normal 3/2013. Starting on new medication, thinks it is topomax.       "Morbid obesity with BMI of 45.0-49.9, adult (H)      PONV (postoperative nausea and vomiting)      Patient Active Problem List   Diagnosis     Morbid obesity (H)     Cervical high risk HPV (human papillomavirus) test positive     Lichen sclerosus et atrophicus of the vulva     Common migraine     Depression with anxiety     History of gastroesophageal reflux (GERD)     IBS (irritable bowel syndrome)     Seasonal allergies     Post-op pain       Past Surgical History  She has a past surgical history that includes Laparoscopy diagnostic (gyn) (, , ); Cholecystectomy (2008); tonsillectomy (2010);  section (, ); Dilation and curettage (2008, 2009); Laparoscopic hysterectomy supracervical, bilateral salpingo-oophorectomy, combined (2009); and daVinci Laparoscopic Cervicectomy (Trachelectomy) (N/A, 2022).     Examination   Ht 5' 7\" (1.702 m)   Wt 302 lb (137 kg)   BMI 47.30 kg/m    GENERAL: Healthy, alert and no distress  EYES: Eyes grossly normal to inspection.  No discharge or erythema, or obvious scleral/conjunctival abnormalities.  RESP: No audible wheeze, cough, or visible cyanosis.  No visible retractions or increased work of breathing.    SKIN: Visible skin clear. No significant rash, abnormal pigmentation or lesions.  NEURO: Cranial nerves grossly intact.  Mentation and speech appropriate for age.  PSYCH: Mentation appears normal, affect normal/bright, judgement and insight intact, normal speech and appearance well-groomed.       Counseling:   We reviewed the important post op bariatric recommendations:  -eating 3 meals daily  -eating protein first, getting >60gm protein daily  -eating slowly, chewing food well  -avoiding/limiting calorie containing beverages  -limiting starchy vegetables and carbohydrates, choosing wheat, not white with breads,   crackers, pastas, jerad, bagels, tortillas, rice  -limiting restaurant or cafeteria eating to twice a week " or less    We discussed the importance of restorative sleep and stress management in maintaining a healthy weight.  We discussed the National Weight Control Registry healthy weight maintenance strategies and ways to optimize metabolism.  We discussed the importance of physical activity including cardiovascular and strength training in maintaining a healthier weight.    Total time spent on the date of this encounter doing: chart review, review of test results, patient visit, physical exam, education, counseling, developing plan of care and documenting = 44 minutes.         FRANNY Archer MD  [facility]

## 2022-11-03 ENCOUNTER — MYC MEDICAL ADVICE (OUTPATIENT)
Dept: ONCOLOGY | Facility: CLINIC | Age: 37
End: 2022-11-03

## 2022-11-08 NOTE — TELEPHONE ENCOUNTER
Pt's completed form to return to work with no restrictions faxed to [address] [place] [address] attn Crystal Mulvihill at [phone].  Pt updated as well.    Anuja Miller RN, BSN, OCN

## 2022-11-20 ENCOUNTER — HEALTH MAINTENANCE LETTER (OUTPATIENT)
Age: 37
End: 2022-11-20

## 2022-12-02 DIAGNOSIS — Z86.69 HISTORY OF MIGRAINE HEADACHES: ICD-10-CM

## 2022-12-02 DIAGNOSIS — E66.01 MORBID OBESITY (H): ICD-10-CM

## 2022-12-05 RX ORDER — TOPIRAMATE 50 MG/1
TABLET, FILM COATED ORAL
Qty: 90 TABLET | Refills: 1 | Status: SHIPPED | OUTPATIENT
Start: 2022-12-05

## 2022-12-05 NOTE — TELEPHONE ENCOUNTER
Called pt:   pt last seen 11/2/22.  Is also on phentermine.  Per call today, pt wants to remain on the topiramate.  Will forward to provider.  Makenzie Barnes, MS, RD, RN

## 2023-04-15 ENCOUNTER — HEALTH MAINTENANCE LETTER (OUTPATIENT)
Age: 38
End: 2023-04-15

## 2023-12-06 NOTE — PROGRESS NOTES
"Melinda is a 38 year old who is being evaluated via a billable video visit.      The patient has been notified of following:     \"This video visit will be conducted via a call between you and your physician/provider. We have found that certain health care needs can be provided without the need for an in-person physical exam.  This service lets us provide the care you need with a video conversation.  If a prescription is necessary we can send it directly to your pharmacy.  If lab work is needed we can place an order for that and you can then stop by our lab to have the test done at a later time.    Video visits are billed at different rates depending on your insurance coverage.  Please reach out to your insurance provider with any questions.    If during the course of the call the physician/provider feels a video visit is not appropriate, you will not be charged for this service.\"    Patient has given verbal consent for Video visit? Yes    How would you like to obtain your AVS? MyChart    If the video visit is dropped, the invitation should be resent by: Text to cell phone: [phone]    Will anyone else be joining your video visit? No    I    Video-Visit Details    Type of service:  Video Visit    Video Start Time:  10:31    Video End Time: 11:00    Originating Location (pt. Location): Home    Distant Location (provider location):  Off-Site (Home Office)     Platform used for Video Visit: Alt[employer]ik            [facility] Weight Management Note         Melinda Bowens  MRN:  [id number]  :  1985  BETHANY:  2023        Dear No primary care provider on file.,    I had the pleasure of seeing your patient Melinda Bowens. She is a 38 year old female who I am continuing to see for treatment of obesity related to:        3/30/2022     4:15 PM   --   I have the following health issues associated with obesity Sleep Apnea    GERD (Reflux)         Assessment   Problem List Items Addressed This Visit       Morbid " "obesity (H) - Primary    Relevant Medications    liraglutide - Weight Management (SAXENDA) 18 MG/3ML pen    insulin pen needle (31G X 5 MM) 31G X 5 MM miscellaneous    Hip pain, unspecified laterality     Other Visit Diagnoses       MAIA (obstructive sleep apnea)                   PLAN/DISCUSSION:  Had a good discussion with patient about bariatric surgery. Will call to see if has insurance coverage. Also interested in WL medications    We discussed the role of pharmacological agents in the treatment of obesity and the \"off-label\" use of medications in this practice. We discussed the risks and benefits of each. We discussed indications, contraindications, potential side effects, and estimated costs of each. Discussed that medications must always be used together with lifestyle changes such as improvements in diet choices, portion control and establishing and maintaining a regular exercise program.     Saxenda sent over to pharmacy. Patient information provided.  Patient has no history of pancreatitis. Patient has no personal or family history of medullary thyroid carcinoma or MEN2.       Plan:   Contact your insurance regarding coverage for bariatric surgery  If covered will call and set up a bariatric surgical evaluation   3 months for a medication check       FOLLOW-UP:  Schedule an in person 60 bariatric surgical consultation OR a 3 month medication check         SUBJECTIVE/OBJECTIVE:  Last seen over a year ago by Dr Archer. Had previously been considering bariatric surgery but decided to stay with [facility]. Was taking phentermine but is off now. Felt like it helped for a while. Continues topiramate given by neurologist.     Feels like things have gotten worse. Has a lot of joint pain - hip, back worsened by her weight. Depression has gotten worse. Will be starting a CPAP soon. Can not get socks off herself. Feels like dignity is going away.     Anti-obesity medications:   Current: topiramate 50 mg in " AM    Failed/contraindicated:   Bupropion - currently taking   Topiramate - given by neurologist   Phentermine - somewhat effective    Recent diet changes: drinks a lot of water over 80 oz       CURRENT WEIGHT:   302 lbs 0 oz    Initial Weight (lbs): 314.1 lbs  Last Visits Weight: 314 lb (142.4 kg)  Cumulative weight loss (lbs): 12.1  Weight Loss Percentage: 3.85%        MEDICATIONS:   Current Outpatient Medications   Medication Sig Dispense Refill    acetaminophen (TYLENOL) 325 MG tablet Take 2 tablets (650 mg) by mouth every 6 hours as needed for mild pain 24 tablet 0    betamethasone valerate (VALISONE) 0.1 % external cream Apply topically 2 times daily 42.5 g 0    buPROPion (WELLBUTRIN XL) 300 MG 24 hr tablet TAKE 1 TABLET (300 MG) BY MOUTH ONCE DAILY.      COMPOUNDED NON-CONTROLLED SUBSTANCE (CMPD RX) - PHARMACY TO MIX COMPOUNDED MEDICATION Bi-est: Estriol 0.4mg, Estradiol 0.1mg. Place 1 gram vaginally at bedtime three times per week with finger 60 g 1    insulin pen needle (31G X 5 MM) 31G X 5 MM miscellaneous Use 1 pen needle daily or as directed. 100 each 1    liraglutide - Weight Management (SAXENDA) 18 MG/3ML pen Week 1: Inject 0.6 mg subcutaneously daily: Week 2: Inject 1.2 mg daily: Week 3: Inject 1.8 mg daily: Week 4: Inject 2.4 mg daily: Week 5 and on: Inject 3.0 mg daily.  Only increase dose if/when having minimal side effects at current dose 15 mL 1    nortriptyline (PAMELOR) 75 MG capsule TAKE 1 CAPSULE BY MOUTH EVERYDAY AT BEDTIME      omeprazole (PRILOSEC) 40 MG DR capsule Take 40 mg by mouth      ondansetron (ZOFRAN-ODT) 4 MG ODT tab       topiramate (TOPAMAX) 50 MG tablet 1/2 TABLET IN THE EVENING. YOU MAY INCREASE TO 1 FULL TABLET AFTER 1 WEEK. 90 tablet 1    traZODone (DESYREL) 100 MG tablet Take 100 mg by mouth At Bedtime      ubrogepant (UBRELVY) 100 MG tablet Take 100 mg by mouth at onset of headache      venlafaxine (EFFEXOR) 75 MG tablet TAKE 1 TABLET BY MOUTH TWICE A DAY      Vitamin D  "(Cholecalciferol) 25 MCG (1000 UT) CAPS Take 1,000 Units by mouth daily      conjugated estrogens (PREMARIN) 0.625 MG/GM vaginal cream Place 1 g vaginally At Bedtime For 30 nights, then three times per week thereafter. Use finger for application. (Patient not taking: Reported on 12/13/2023) 30 g 3    oxyCODONE-Acetaminophen 5-300 MG TABS  (Patient not taking: Reported on 12/13/2023)      phentermine (ADIPEX-P) 37.5 MG tablet Take 1 tablet every morning with breakfast. (Patient not taking: Reported on 12/14/2023) 90 tablet 0    rizatriptan (MAXALT) 10 MG tablet TAKE 1 TAB BY MOUTH AT ONSET OF MIGRAINE.MAY REPEAT IN 2 HOURS.MAX 2TABS/24HR (Patient not taking: Reported on 12/14/2023)           ROS 12/14/23  General  Fatigue: yes  HEENT  Hx of glaucoma: no  Vision changes: no  Cardiovascular  Hx of heart disease: no  Chest Pain with Exertion: no  Palpitations: no  Pulmonary  Shortness of breath at rest: no  Shortness of breath with exertion: yes  Stop-bang score: known sleep apnea  Gastrointestinal  Heartburn: yes, takes daily omeprazole  No pancreatitis   No severe constipation  No bowel obstruction   Gallbladder removed  Psychiatric  Moods Stable: she is down  Endocrine  Polydipsia: yes  No personal or family history of medullary thyroid cancer: no  Neurologic  Hx of seizures: no  Migraine headaches: yes    Birth control: hysterectomy      PHYSICAL EXAM:  Objective    Ht 5' 7\" (1.702 m)   Wt 302 lb (137 kg)   BMI 47.30 kg/m    GENERAL: Healthy, alert and no distress  EYES: Eyes grossly normal to inspection.  No discharge or erythema, or obvious scleral/conjunctival abnormalities.  RESP: No audible wheeze, cough, or visible cyanosis.  No visible retractions or increased work of breathing.    SKIN: Visible skin clear. No significant rash, abnormal pigmentation or lesions.  NEURO: Cranial nerves grossly intact.  Mentation and speech appropriate for age.  PSYCH: Mentation appears normal, affect normal/bright, " judgement and insight intact, normal speech and appearance well-groomed.        Sincerely,    Amy Nicholson PA-C    40 minutes spent on the date of the encounter doing chart review, review of test results, patient visit and documentation

## 2023-12-14 ENCOUNTER — VIRTUAL VISIT (OUTPATIENT)
Dept: SURGERY | Facility: CLINIC | Age: 38
End: 2023-12-14

## 2023-12-14 VITALS — WEIGHT: 293 LBS | HEIGHT: 67 IN | BODY MASS INDEX: 45.99 KG/M2

## 2023-12-14 DIAGNOSIS — G47.33 OSA (OBSTRUCTIVE SLEEP APNEA): ICD-10-CM

## 2023-12-14 DIAGNOSIS — M25.559 HIP PAIN, UNSPECIFIED LATERALITY: ICD-10-CM

## 2023-12-14 DIAGNOSIS — E66.01 MORBID OBESITY (H): Primary | ICD-10-CM

## 2023-12-14 PROCEDURE — 99215 OFFICE O/P EST HI 40 MIN: CPT | Mod: VID | Performed by: PHYSICIAN ASSISTANT

## 2023-12-14 PROCEDURE — 97803 MED NUTRITION INDIV SUBSEQ: CPT | Mod: 95

## 2023-12-14 NOTE — PATIENT INSTRUCTIONS
"Nice to talk with you today! Thank you for allowing me the privilege of caring for you.   We hope we provided you with the excellent service you deserve.     To ensure the quality of our services you may receive a patient satisfaction survey from an independent monitoring company.  The greatest compliment you can give is \"Likely to Recommend\"      Below is our plan we discussed.-  RASHMI Reyes      Plan:   Contact your insurance regarding coverage for bariatric surgery  If covered will call and set up a bariatric surgical evaluation   Start Saxenda    Please call [phone] and schedule a follow up with Amy Nicholson PA-C in 3 months.  If you need to reach me sooner you can do so by calling [phone].    Have a great day!       SAXENDA (liraglutide)    Saxenda is a GLP-1 (Glucagon-like Peptide-1) medication.One of the ways it works is by slowing down the rate that food leaves your stomach. You feel veliz and will eat less. It also helps regulate hormones that can help improve your blood sugars.    Dosing for this medication:   Week 1- Inject 0.6 mg daily  Week 2- Inject 1.2 mg daily  Week 3- Inject 1.8 mg daily (If hunger and portions controlled stay at this dose)  Week 4- Inject 2.4 mg daily  Week 5 and thereafter- Inject 3.0 mg daily    Side effects of GLP- Medications include: The most common side effects are all GI related and consist of: nausea, heartburn, constipation, diarrhea, burping, or gassiness. Patients are advised to eat slowly and less, and nausea typically passes if people can stick it out.     The risk of pancreatitis (inflammation of the pancreas) has been associated with this type of medication, but is very rare.  If you have had pancreatitis in the past, this medication may not be for you. Please let us know about any past history of pancreas problems.  Symptoms of pancreatitis include: Pain in your upper stomach area which may travel to your back and be worse after eating. Your stomach area " may be tender to the touch.  You may have vomiting or nausea and/or have a fever. If you should develop any of these symptoms, stop the medication and contact your primary care doctor. They will do a blood test to check for pancreatitis.       This medication is usually not covered by insurance and can be quite expensive. Sometimes a prior authorization is required, which may take up to 1-2 weeks for an insurance company to make a decision if they will cover the medication. Please be patient, you will be notified after a decision has been made.     (Do not stop taking it if you don't think it's working. For some people it works without them knowing it.)     In order to get refills of this or any medication we prescribe you must be seen in the medical weight mgmt clinic every 2-4 months.             Using a Victoza or  Saxenda Pen     Shape     Medicine: Liraglutide (Vzp-b-RPMA-tide)     Storing your pens     Store your pens in the refrigerator until you use them. You can keep a pen at room temperature for 30 days. After that, throw it away.       Get your pen ready (before first use only):     Wash and dry your hands well.     Remove the pen cap.     Look at the window in the pen to make sure the liquid is clear and has no color or specks.     Do not use the pen if it is not clear, has a color or you can see specks in it.     It is normal to see air bubbles.     Remove the seal from the new pen needle and carefully screw it onto the end of the pen.     Remove the outer needle cap and set it aside. Remove the inner needle cap and throw it away.     Turn the knob on the pen until you see -- in the dose window.     Hold the pen with the needle pointing straight up. Gently tap the side of the pen to get rid of any air bubbles.     Push the injection button until you see 0mg in the dose window. You should see a drop of liquid at the end of the needle. This means your pen is ready to use.       Inject your dose:     Wash  and dry your hands well.     Remove the pen cap.     Look at the window to make sure the medicine is clear and has no color or specks.     Do not use the pen if it is not clear or has a color or you can see specks.     It is normal to see air bubbles.     Remove the seal from the new pen needle and screw it onto the end of the pen.     Remove the outer needle cap and set it aside. Remove the inner needle cap and throw it away.     Turn the knob away from you until you see the number for your dose in the window.     Use an alcohol swab to clean your skin (remember to change injection sites).     Insert the needle straight into your skin so that it reaches the fatty layer.     Use your thumb to slowly press the button on the end of the pen until you see 0mg. Hold it for 6 seconds to allow time for the medicine to get into your body.     Release the button and pull the needle out.     Put the needle cap on the end of the pen and unscrew the needle from the pen. Put the used needle into a SHARPS container.     Put the cap on the pen and store at room temperature, away from sunlight.       If you have questions about using your pen, please ask your pharmacist or doctor.

## 2023-12-14 NOTE — PROGRESS NOTES
"Virtual Visit Details    Type of service:  Video Visit     Originating Location (pt. Location): Home    Distant Location (provider location):  On-site  Platform used for Video Visit: Steve     MEDICAL WEIGHT LOSS FOLLOW UP/PRE SURGERY      DIAGNOSIS:  Obese, class III     NUTRITION HISTORY:  Breakfast: [wakes at 6am or 8am] cheese stick or banana upon waking to take medication  11am - cereal  peanut butter toast  yogurt + granola + berries  Lunch: [2pm] leftovers  chicken + cheese in spinach wrap  Supper: [6pm] chicken + vegetables + rice  pasta; salad or vegetables + cheese/meat and crackers; chicken quesadila   Snacks: pre-bags snacks - nuts, dried fruit, peanut butter pretzels, popcorn   Fluids Consumed: Water (2.5 gallons/day) - gulps and drinks through straw  Eating slower: Yes  Chewing foods thoroughly: Yes  Take 20-30 minutes to consume each meal: usually  Fluids and meals separate by at least 30 minutes: Yes  Carbonation: none  Caffeine: limited   Additional Information: Patient has been trying to focus on increasing water and feels does better with small meals.  Patient doesn't sit down and eat breakfast and lunch -only on weekends. Patient eating smaller dinner.  Patient feels makes good choices until she doesn't.  Or if exhausted [employer] or pizza when doesn't feel well.  Patient has 6 people living in home , 20,15, 13 1 year old grandchild.  Membership to [employer] and can work out 10 minutes.  Patient struggling with body and joint pain.  Patient states feels she is losing her dignity as unable to take off her shoes and socks when gets home from work.  Will start weekly injection for weight loss. Patient stopped drinking sparkling water; coke on occasion -migraine related (omer drops in water caffeine) cut up veggies and fruit as snacks   OA at residential treatment    VITAMINS/MINERALS   Vitamin D   Calcium   MVI     ANTHROPOMETRICS:  Height: 67\"  Initial Weight: 314 lbs     Previous " weight: 307 lbs 0 oz  Current weight: 302 lbs   BMI: Body mass index is 47.3 kg/m .    MEDICATION FOR WEIGHT LOSS:  Phentermine and Topamax      EVALUATION/PROGRESS TOWARDS GOALS:  Previous Goals:  Take Calcium per guidelines (reviewed)  Eat your first meal within 1h of waking, then eat every 4-6 hours  Add strength training 2-3x/week once off exercise restrictions  Continue to practice all pre-op guidelines    Previous Nutrition Diagnosis:  Obese class III related to overeating and poor lifestyle habits as evidence by patient's subjective statements and  BMI of 48 kg/m2-no change     Current Nutrition Diagnosis:   Obese class III related to overeating and poor lifestyle habits as evidence by patient's subjective statements and  BMI of 47.3 kg/m2    INTERVENTION:    Nutrition Prescription:  Recommend modified nutrient intake by decreasing energy intake    Implementation:    Meals and Snacks: 3 meals     Nutrition Education (Content):  Discussed previous goals and determined new goals  Encouraged physical activity  Supported patient in attempted weight loss and behavior changes  Reviewed weight loss surgery guidelines   Patient verbalizes understanding of weight management by stating will eliminate fried foods   Expected patient engagement: good     Goals:  Eliminate fried foods   Start peeling apples   Avoid liquids at meals     Follow Up/Monitoring:  Other  -  patient to follow up in 4 weeks    Time Spent With Patient:  30 Minutes    Hayder Myers, RD, LD  [facility]   [phone] (office phone)

## 2023-12-14 NOTE — PATIENT INSTRUCTIONS
Sanjay Lawrence,    It was nice seeing you today.  Here are the goals you determined:     Eliminate fried foods   Start peeling apples   Avoid liquids at meals     My Plate (carbohydrate portion sizes)    Vitamin Requirements    Complete multivitamin and mineral (1 per day)    Calcium with vitamin D    Calcium citrate preferred source   Take 600 mg 2 times per day or 500 mg 3 times per day   Take 2 hours apart from complete multivitamin and mineral    5000 international unit(s) vitamin D    Please call [phone] to schedule your next RD appointment in 4 weeks.    Take Cinthia gaona MEd, RD, LD  [facility] Dietitian[facility] Loss [facility]  [phone] (office phone)

## 2024-03-21 NOTE — PROGRESS NOTES
Gynecology Oncology Progress Note  September 27, 2022    Ms. Melinda Bowens is a 36 year old POD#1 s/p RA trachelectomy    Dz: Persistent HPV w/ inadequate colposcopy    24 hour events:   - Surgery  - Persistent dizziness  - Intermittently requiring O2 supplementation    Subjective: Pain well controlled. Ambulating with improvement in dizziness. Tolerating PO without nausea or vomiting.  Voiding without issues. Denies fevers, chills, chest pain, SOB. No other questions or concerns.    Objective:   /54 (BP Location: Left arm, Patient Position: Semi-Hubbard's, Cuff Size: Adult Large)   Pulse 72   Temp (!) 96.4  F (35.8  C) (Oral)   Resp 18   Wt 139.3 kg (307 lb)   SpO2 93%   BMI 48.08 kg/m      General: NAD  CV: RR, well perfused  Resp: No increased work of breathing, saturating above 90% on RA during evaluation  Abdomen: soft, appropriately tender, mildly distended  Incision: c/d/i with overlying surgical glue  Extremities: nontender, trace edema  Lines/Drains: PIV    I/Os    Intake/Output Summary (Last 24 hours) at 9/27/2022 0521  Last data filed at 9/27/2022 0429  Gross per 24 hour   Intake 900 ml   Output 1110 ml   Net -210 ml       New labs/imaging-  AM CBC, BMP ordered    Assessment: 36 year old POD#1 s/p RA trachelectomy. Doing well in the immediate postoperative period. VSS but requiring 1 LPM by NC intermittently overnight. Pt admitted overnight due to oxygen requirement and persistent dizziness. Plan to wean oxygen this AM and likely discharge to home later today.    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  FEN: Regular diet  Pain: Tylenol, ibuprofen, oxy PRN  Heme: Hgb 12.8> EBL 10cc> AM CBC ordered.  CV: NI  Pulm: NI  GI: Sched bowel reg, antiemetics PRN. PTA phentermine held.  : S/p welch  ID: NI, s/p preop abx.  Endo: NI  Psych/Neuro/MSK: Migraines, PTA topamax and nortriptyline. PTA trazodone held.   PPX: SCDs, IS  Dispo: Pending course  Drains/Lines:  Physical Therapy Treatment    Patient Name: Peewee Valle  MRN: [id number]  Encounter date:  3/21/2024  Time Calculation  Start Time:   Stop Time:   Time Calculation (min):  min  PT Therapeutic Procedures Time Entry  Manual Therapy Time Entry:   Therapeutic Exercise Time Entry:     Visit Number:  Visit count could not be calculated. Make sure you are using a visit which is associated with an episode. (including evaluation)  Planned total visits: ***  Visits Authorized/Insurance Coverage:  ***    Current Problem  No diagnosis found.    Surgery  ***    Surgery date:  ***    Precautions       Pain       Subjective  General        ***    Objective  ***  Extremity/Trunk Assessment  {Extremity/Trunk Assessments:23940}    {Spine,UE,LE,HAND,LYMPHEDEMA:72135}    Treatment:  {PT Treatments:45816}  ***    Aquatic Therapy:        Current HEP:  ***    Activity tolerance:  {Activity:69754}    OP EDUCATION:       Assessment:     Pt's response to treatment:  ***  Areas of improvements:  ***  Limitations/deficits:  ***    Pain end of session: ***    Plan:     {BASPLAN:60203}    Assessment of current progress against goals:  {BASPTNOTEGOALASSESSMENT:61967}    Goals:     VIOLETA Mcintosh MD MPH  Gynecologic Oncology, PGY-2  September 27, 2022, 5:21 AM   Pager [phone]    IAnthony MD personally examined and evaluated this patient on 09/27/22.  I discussed the patient with the resident and care team, and agree with the assessment and plan of care as documented in the residents note above.    I personally reviewed vital signs, laboratory values and imaging results.    Pt stayed overnight due to O2 requirement and dizziness which have improved this AM.  Otherwise meeting post-op goals and will discharge home today.    Mary Beth Wilkes MD  Gynecologic Oncology  [facility]

## 2024-04-03 NOTE — PROGRESS NOTES
"  Melinda is a 38 year old who is being evaluated via a billable video visit.      The patient has been notified of following:     \"This video visit will be conducted via a call between you and your physician/provider. We have found that certain health care needs can be provided without the need for an in-person physical exam.  This service lets us provide the care you need with a video conversation.  If a prescription is necessary we can send it directly to your pharmacy.  If lab work is needed we can place an order for that and you can then stop by our lab to have the test done at a later time.    Video visits are billed at different rates depending on your insurance coverage.  Please reach out to your insurance provider with any questions.    If during the course of the call the physician/provider feels a video visit is not appropriate, you will not be charged for this service.\"    Patient has given verbal consent for Video visit? Yes    How would you like to obtain your AVS? MyChart    If the video visit is dropped, the invitation should be resent by: Send to e-mail at: [email or web address]    Will anyone else be joining your video visit? No    I    Video-Visit Details    Type of service:  Video Visit    Originating Location (pt. Location): Home    Distant Location (provider location):   [facility]    Platform used for Video Visit: Neteven    Video Start Time:  8:45    Video End Time: 9:30      New Bariatric Nutrition Consultation Note    Reason For Visit: Nutrition Assessment    Melinda Bowens is a 38 year old presenting today for new bariatric nutrition consult.  Pt is interested in laparoscopic  undecided  .  Patient is accompanied by self.      updated questionnaire 4/9/24      3/30/2022     4:15 PM   Support System Reviewed With Patient   Who do you have in your support network that can be available to help you for the first 2 weeks after surgery? My  " "  Who can you count on for support throughout your weight loss surgery journey?  and friends       ANTHROPOMETRICS:  Estimated body mass index is 47.3 kg/m  as calculated from the following:    Height as of 12/14/23: 5' 7\" (1.702 m).    Weight as of 12/14/23: 302 lb (137 kg).    Current weight: 298 lb  Estimated BMI: 46.67 kg/m2    Required weight loss goal pre-op: to be determined at provider visit        3/30/2022     4:15 PM   --   I have tried the following methods to lose weight Watching portions or calories   Topamax   Exercise    Weight Watchers    Atkins type diet (low carb/high protein)    OTC Medications           3/30/2022     4:15 PM   Weight Loss Questions Reviewed With Patient   How long have you been overweight? Following one or more pregnancies       SUPPLEMENT INFORMATION:  1 multivitamin/ mineral- women's daily multivitamin- bed time  1000 international unit(s) Vitamin D  Vitamin E (not sure of dose)  650 mg Calcium chew BID-morning/ dinner    NUTRITION HISTORY:      3/30/2022     4:15 PM-updated 4/9/24   Recall Diet Questions Reviewed With Patient   Describe what you typically consume for breakfast (typical or most recent) Hard cooked egg, banana, yogurt @ 8-9 am   Describe what you typically consume for lunch (typical or most recent) Leftovers from dinner, raw veggies and fruit-Ankit box @ 12:30-1 pm   Describe what you typically consume for supper (typical or most recent) Chicken or turkey, lettuce salad, cooked green beans or broccoli or stir calle or wrap @ 6 pm   Describe what you typically consume as snacks (typical or most recent) Orange or apple- occasional   How many ounces of water, or other low calorie drinks, do you drink daily (8 oz=1 glass)? 120 oz-water or enhanced water   How many ounces of caffeine (coffee, tea, pop) do you drink daily (8 oz=1 glass)? Yes- if migraine    How many ounces of carbonated (pop, beer, sparkling water) drinks do you drinky daily (8 oz=1 glass)? no "   How many ounces of juice, pop, sweet tea, sports drinks, protein drinks, other sweetened drinks, do you drink daily (8 oz=1 glass)? 8 oz   How often do you drink alcohol? Monthly or less   If you do drink alcohol, how many drinks might you have in a day? (one drink = 5 oz. wine, 1 can/bottle of beer, 1 shot liquor) 1 or 2           3/30/2022     4:15 PM- updated 4/9/24   Eating Habits   Do you have any dietary restrictions? Yes   Do you currently binge eat (eat a large amount of food in a short time)? No   Are you an emotional eater? Yes-improved/ occasional stress eating   Do you get up to eat after falling asleep? No   What foods do you crave? Fruit and veggies. Chocolate or sweets       ADDITIONAL INFORMATION:  Patient has questions about Saxenda being on back order (from December). Encouraged patient to send Novitaz message to her provider (next visit is July 2024).  Patient, adult daughter  and her spouse share the cooking and grocery shopping. Pt feels she gets full faster and cravings are reduced with use of Topamax. Patient has IBS and avoids certain foods. Does not like milk and is allergic to soy.        3/30/2022     4:15 PM   Dining Out History Reviewed With Patient   How often do you dine out? Around once a week.   Where do you dine out? (select all that apply) sit-down restaurants    fast food chains    take out   What types of food do you order when you dine out? Mexican, chicken           3/30/2022     4:15 PM-updated 4/9/24   Physical Activity Reviewed With Patient   How often do you exercise? 1 to 2 times per week   What is the duration of your exercise (in minutes)? 20 Minutes   What types of exercise do you do? Walking track-[facility]   What keeps you from being more active? My ability to walk or move around is limited    Lack of Time    Too tired       NUTRITION DIAGNOSIS:  Class III Obesity r/t long history of self-monitoring deficit and excessive energy intake aeb BMI >30  kg/m2.    INTERVENTION:  Intervention Provided/Education Provided on post-op diet guidelines, vitamins/minerals essential post-operatively, GI anatomy of bariatric surgeries, ways to help prepare for post-op diet guidelines pre-operatively, portion/calorie-control.  Provided pt with list of goals RD contact information.          3/30/2022     4:15 PM   Questions Reviewed With Patient   How ready are you to make changes regarding your weight? Number 1 = Not ready at all to make changes up to 10 = very ready. 7   How confident are you that you can change? 1 = Not confident that you will be successful making changes up to 10 = very confident. 6       Patient Understanding: fair-good  Expected Compliance: fair-good    GOALS:  Increase exercise to 3 times per week  Increase vitamin D to 3000 international unit(s) per day  Find a dairy free and soy free protein drink (offered suggestion)    Follow-Up:   Recommend 2-3 follow up visits to assist with lifestyle changes or per insurance.      Time spent with patient: 45 minutes.  Serjio Will RD, DOMENICO  [facility]

## 2024-04-07 ENCOUNTER — HEALTH MAINTENANCE LETTER (OUTPATIENT)
Age: 39
End: 2024-04-07

## 2024-04-09 ENCOUNTER — VIRTUAL VISIT (OUTPATIENT)
Dept: SURGERY | Facility: CLINIC | Age: 39
End: 2024-04-09
Payer: COMMERCIAL

## 2024-04-09 VITALS — WEIGHT: 293 LBS | BODY MASS INDEX: 46.67 KG/M2

## 2024-04-09 DIAGNOSIS — E66.01 MORBID OBESITY (H): Primary | ICD-10-CM

## 2024-04-09 PROCEDURE — 97803 MED NUTRITION INDIV SUBSEQ: CPT | Mod: 95

## 2024-04-09 NOTE — PATIENT INSTRUCTIONS
Sanjay Lawrence-  [place]alacome to the [facility], [place]! It was great to visit with you and learn about  your interest in weight loss. Below are the goals we discussed.  GOALS:  Increase exercise to 3 times per week  Increase vitamin D to 3000 international unit(s) per day  Find a dairy free and soy free protein drink (offered suggestion)    Nutrition Educational Materials:  Your Stage 1 Diet: Clear Liquids     Your Stage 2 Diet: Low-fat Full Liquids     Your Stage 3 Diet: Pureed Foods     Your Stage 4 Diet: Soft Foods    Your Stage 5 Diet: Regular Foods    Diet Guidelines after Weight-Loss Surgery     Supplements after Sleeve Gastrectomy, Gastric Bypass or Single Anastomosis Duodenal Switch Surgery     Please call [phone] to schedule your next visits with a Dietitian in 1 and 2 months.  Thanks!  Serjio Will RD, LD  [facility]

## 2024-05-01 ENCOUNTER — VIRTUAL VISIT (OUTPATIENT)
Dept: SURGERY | Facility: CLINIC | Age: 39
End: 2024-05-01
Payer: COMMERCIAL

## 2024-05-01 DIAGNOSIS — E66.01 MORBID OBESITY (H): Primary | ICD-10-CM
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
## 2024-05-15 ENCOUNTER — VIRTUAL VISIT (OUTPATIENT)
Dept: SURGERY | Facility: CLINIC | Age: 39
End: 2024-05-15
Payer: COMMERCIAL

## 2024-05-15 ENCOUNTER — TRANSFERRED RECORDS (OUTPATIENT)
Dept: HEALTH INFORMATION MANAGEMENT | Facility: CLINIC | Age: 39
End: 2024-05-15

## 2024-05-15 DIAGNOSIS — E66.01 MORBID OBESITY (H): Primary | ICD-10-CM

## 2024-05-15 NOTE — PROGRESS NOTES
Virtual Visit Details    Type of service:  Video Visit     Originating Location (pt. Location): Home    Distant Location (provider location):  Off-site  Platform used for Video Visit: Zoom (Telehealth)    Start Time: 3 PM  End Time: 3:40 PM    Melinda has made some changes in eating and lifestyle, but she still needs to be more mindful about her eating. Issues around her mood and anxiety will need to be addressed as well. She will follow up with a list of activities and mindful eating strategies. F32.9; F43.9; E66.01

## 2024-05-31 ENCOUNTER — TELEPHONE (OUTPATIENT)
Dept: SURGERY | Facility: CLINIC | Age: 39
End: 2024-05-31
Payer: COMMERCIAL

## 2024-05-31 NOTE — TELEPHONE ENCOUNTER
General Call      Reason for Call:  Patient called inquiring about support group    What are your questions or concerns:  see above    Date of last appointment with provider: 12/14/23    Could we send this information to you in [place] or would you prefer to receive a phone call?:   Patient would prefer a phone call   Okay to leave a detailed message?: Yes at Cell number on file:    Telephone Information:   Mobile [phone]

## 2024-06-04 NOTE — PROGRESS NOTES
"  Melinda is a 38 year old who is being evaluated via a billable video visit.      The patient has been notified of following:     \"This video visit will be conducted via a call between you and your physician/provider. We have found that certain health care needs can be provided without the need for an in-person physical exam.  This service lets us provide the care you need with a video conversation.  If a prescription is necessary we can send it directly to your pharmacy.  If lab work is needed we can place an order for that and you can then stop by our lab to have the test done at a later time.    Video visits are billed at different rates depending on your insurance coverage.  Please reach out to your insurance provider with any questions.    If during the course of the call the physician/provider feels a video visit is not appropriate, you will not be charged for this service.\"    Patient has given verbal consent for Video visit? Yes    How would you like to obtain your AVS? MyChart    If the video visit is dropped, the invitation should be resent by: Send to e-mail at: [email or web address]    Will anyone else be joining your video visit? No    I    Video-Visit Details    Type of service:  Video Visit    Originating Location (pt. Location): Home    Distant Location (provider location):   Off-Site - Provider Home Office    Platform used for Video Visit: Rapport    Video Start Time: 8:59    Video End Time:9:28    PRE SURGICAL WEIGHT LOSS NUTRITION APPOINTMENT    Melinda Bowens  1985  female  [id number]  38 year old    ASSESSMENT    Desired Surgical Procedure: undecided    REASON FOR VISIT:  Melinda Bowens is a 38 year old year old female presents today for a pre-surgical weight loss follow-up appointment. Patient accompanied by self.    DIAGNOSIS:  Weight Status Obesity Grade III BMI >40 (based on weight at 4/9/24 RD visit)    ANTHROPOMETRICS:  Height: 67\"   Initial Weight: 314 lb     Weight at " last visit: 302 lb  Current weight:  n/a-scale broke  BMI: n/a   kg/(m^2).    VITAMINS AND MINERALS:  2 gumi Multivitamin with Minerals-AM  2-650 mg Calcium chew with Vitamin D-AM  6000 International units Vitamin D  Vitamin C  Not getting periods      NUTRITION HISTORY:  Breakfast: hard cooked eggs or yogurt or toast with peanut butter or Honey Nut Cheerios with milk  Lunch: chicken or turkey, cheese, lettuce,Triscuit crackers,  fruit, raw veggies  Supper: protein, pasta or rice  Snacks: nuts or string cheese or meat sticks or protein chips  Fluids Consumed: 80 oz water or enhance water, 3 Cokes in 3 months, ETOH-none, occasional  Eating slower: Yes  Chewing foods thoroughly: Yes  Take 20-30 minutes to consume each meal: Yes  Fluids and meals separate by at least 30 minutes: Yes  Carbonation: yes  Caffeine: yes  Additional Information: Patient was confused about where to get labs checked. Suggested making an apt at [facility]. Patient had many appropriate questions    MEDICATION FOR WEIGHT LOSS:  Topamax    PHYSICAL ACTIVITY:  Type: walking or yard work or Xtalic-swimming  Frequency: 6 (days per week)  Duration: 20(minutes)     DIAGNOSIS:  Previous Nutrition Diagnosis: Obesity related to long history of self- monitoring deficit and excessive energy intake evidenced by BMI of 47.3 kg/m2  No change, modified below    Previous goals:   Increase exercise to 3 times per week-met  Increase vitamin D to 3000 international unit(s) per day-met/ exceeding  Find a dairy free and soy free protein drink (offered suggestion)-met    Current Nutrition Diagnosis: Obesity related to long history of self-monitoring deficit and excessive energy intake as evidenced by BMI of n/a kg/m2.    INTERVENTION:  Nutrition Prescription: Recommended energy/nutrient modification.    GOALS:  Switch to a multivitamin/ mineral that meets clinic guidelines (examples provided)- try taking 30 minutes after dinner  Take 650 mg calcium 2X  per day and take at least 2 hours away from multivitamin/ mineral  Decrease vitamin D to 3000 international unit(s) per day unless advised otherwise by your provider  Eliminate all pop      Intervention:  - Discussed progress towards previous goals.  - Reinforced importance of making behavior changes in preparation for bariatric surgery.   - Assessed learning needs and learning preferences       NUTRITION MONITORING AND EVALUATION:  Anticipated compliance: fair-good  Patient demonstrated fair-good understanding.       Follow up: Continue to monitor patient closely regarding weight loss and diet.  # of visits needed: 1  Cleared by RD: No     TIME SPENT WITH PATIENT: 29 minutes  Serjio Will RD, LD  [facility]

## 2024-06-11 ENCOUNTER — VIRTUAL VISIT (OUTPATIENT)
Dept: SURGERY | Facility: CLINIC | Age: 39
End: 2024-06-11
Payer: COMMERCIAL

## 2024-06-11 DIAGNOSIS — E66.01 MORBID OBESITY (H): Primary | ICD-10-CM

## 2024-06-11 PROCEDURE — 97803 MED NUTRITION INDIV SUBSEQ: CPT | Mod: 95

## 2024-06-11 NOTE — PATIENT INSTRUCTIONS
Sanjay Lawrence-   It was great to visit with you and learn about your progress. Below are the goals we discussed.  GOALS:  Switch to a multivitamin/ mineral that meets clinic guidelines (examples- Up and Up Kids Multivitamin Complete or Equate Childrens Chewable Complete Multivitamin provided)- try taking 30 minutes after dinner  Take 650 mg calcium 2X per day and take at least 2 hours away from multivitamin/ mineral  Eliminate all pop  Please call [phone] to schedule your next visit with a Dietitian in 1 month.  Thanks!  Serjio Will RD, LD  [facility]

## 2024-06-12 ENCOUNTER — VIRTUAL VISIT (OUTPATIENT)
Dept: SURGERY | Facility: CLINIC | Age: 39
End: 2024-06-12
Payer: COMMERCIAL

## 2024-06-12 DIAGNOSIS — E66.01 MORBID OBESITY (H): Primary | ICD-10-CM

## 2024-06-12 NOTE — PROGRESS NOTES
Virtual Visit Details    Type of service:  Video Visit     Originating Location (pt. Location): Home    Distant Location (provider location):  Off-site  Platform used for Video Visit: Zoom (Telehealth)    Start Time: 1:45 PM  End Time: 2:10 PM    Melinda has made quality changes in eating and lifestyle and appears more mindful about her eating. She is now ready to proceed with surgery. A report was sent to the clinic. F32.9; F43.9; E66.01

## 2024-06-16 ENCOUNTER — HEALTH MAINTENANCE LETTER (OUTPATIENT)
Age: 39
End: 2024-06-16

## 2024-07-01 NOTE — PROGRESS NOTES
"Melinda is a 38 year old who is being evaluated via a billable video visit.      The patient has been notified of following:     \"This video visit will be conducted via a call between you and your physician/provider. We have found that certain health care needs can be provided without the need for an in-person physical exam.  This service lets us provide the care you need with a video conversation.  If a prescription is necessary we can send it directly to your pharmacy.  If lab work is needed we can place an order for that and you can then stop by our lab to have the test done at a later time.    Video visits are billed at different rates depending on your insurance coverage.  Please reach out to your insurance provider with any questions.    If during the course of the call the physician/provider feels a video visit is not appropriate, you will not be charged for this service.\"    Patient has given verbal consent for Video visit? Yes    How would you like to obtain your AVS? MyChart    If the video visit is dropped, the invitation should be resent by: Text to cell phone: [phone]    Will anyone else be joining your video visit? No    I    Video-Visit Details    Type of service:  Video Visit    Originating Location (pt. Location): Home    Distant Location (provider location):   [facility]    Platform used for Video Visit: Transerv    Video Start Time:  8:58    Video End Time: 9:23      PRE SURGICAL WEIGHT LOSS NUTRITION APPOINTMENT    Melinda Bowens  1985  female  [id number]  38 year old    ASSESSMENT    Desired Surgical Procedure: undecided    REASON FOR VISIT:  Melinda Bowens is a 38 year old year old female presents today for a pre-surgical weight loss follow-up appointment. Patient accompanied by self.    DIAGNOSIS:  Weight Status Obesity Grade III BMI >40    ANTHROPOMETRICS:  Height:  67\"  Initial Weight: 314 lb     Weight last visit: n/a    Current weight: " "300 lb   BMI: 46.99  kg/(m^2)    VITAMINS AND MINERALS:   1  Multivitamin with Minerals-Children's chewable complete-PM  650 mg Calcium chew with Vitamin D-AM and lunch  2-3000 International units Vitamin D  Vitamin C chew  Not getting periods    MEDICATION FOR WEIGHT LOSS:  Topamax    NUTRITION HISTORY:  Breakfast: yogurt or cheese or eggs, sometimes banana with peanut butter  Lunch: \"adult lunchable\"-string cheese, pickles or peppers, turkey or chicken, fruit  Supper: chicken or beef, cooked veggies, pasta or rice, a couple of times per week  Snacks: rare  Fluids Consumed: 64 oz water or enhanced water  Eating slower: Yes  Chewing foods thoroughly: Yes  Take 20-30 minutes to consume each meal: Yes  Fluids and meals separate by at least 30 minutes: Yes  Carbonation: no  Caffeine: no  Additional Information: Patient as under 300 lb and attributes weight gain to going on vacation. Feels like Topamax was helping more after first starting it. Laggy video and difficult to hear pt at times.       PHYSICAL ACTIVITY:  Type: walking/ walking at pool  Frequency: 7/2 (days per week)  Duration: 30/30 (minutes)     DIAGNOSIS:  Previous Nutrition Diagnosis: Obesity related to long history of self- monitoring deficit and excessive energy intake evidenced by BMI of n/a kg/m2  No change, modified below    Previous goals:   Switch to a multivitamin/ mineral that meets clinic guidelines (examples provided)- try taking 30 minutes after dinner-met  Take 650 mg calcium 2X per day and take at least 2 hours away from multivitamin/ mineral-met  Decrease vitamin D to 3000 international unit(s) per day unless advised otherwise by your provider-n/a- has not had labs checked  Eliminate all pop-met    Current Nutrition Diagnosis: Obesity related to long history of self-monitoring deficit and excessive energy intake as evidenced by BMI of 46.99 kg/m2.    INTERVENTION:  Nutrition Prescription: Recommended energy/nutrient " modification.    GOALS:  Continue to practice all pre-op behavior changes  Have pre-op labs checked (discussed scheduling lab only visit at [facility])      Intervention:  - Discussed progress towards previous goals.  - Reinforced importance of making behavior changes in preparation for bariatric surgery.   - Assessed learning needs and learning preferences       NUTRITION MONITORING AND EVALUATION:  Anticipated compliance: fair-good  Patient demonstrated fair-good understanding.   Patient has met pre bariatric surgery diet requirements    Follow up: Continue to monitor patient closely regarding weight loss and diet.  # of visits needed: 0 (will need follow up every 2 months if surgery not scheduled)  Cleared by RD: Yes     TIME SPENT WITH PATIENT: 24 minutes  Serjio Will RD, LD  [facility], [place]

## 2024-07-08 ENCOUNTER — VIRTUAL VISIT (OUTPATIENT)
Dept: SURGERY | Facility: CLINIC | Age: 39
End: 2024-07-08
Payer: COMMERCIAL

## 2024-07-08 VITALS — WEIGHT: 293 LBS | BODY MASS INDEX: 46.99 KG/M2

## 2024-07-08 DIAGNOSIS — E66.01 MORBID OBESITY (H): Primary | ICD-10-CM

## 2024-07-08 PROCEDURE — 97803 MED NUTRITION INDIV SUBSEQ: CPT | Mod: 95

## 2024-07-08 NOTE — PATIENT INSTRUCTIONS
Sanjay Lawrence-   It was great to visit with you and learn about your your progress. Below are the goals we discussed.  GOALS:  Continue to practice all pre-op behavior changes  Have pre-op labs checked (discussed scheduling lab only visit at [facility])  Thanks!  Serjio Will RD, LD  [facility], [place]

## 2024-07-10 ENCOUNTER — APPOINTMENT (OUTPATIENT)
Dept: LAB | Facility: CLINIC | Age: 39
End: 2024-07-10
Payer: COMMERCIAL

## 2024-07-11 ENCOUNTER — TELEPHONE (OUTPATIENT)
Dept: SURGERY | Facility: CLINIC | Age: 39
End: 2024-07-11
Payer: COMMERCIAL

## 2024-07-11 NOTE — TELEPHONE ENCOUNTER
Received voicemail on NL from Jeanette HIGHTOWER with [facility] regarding patient.    Per report, patient presented to the [facility] lab to have pre-op lab work drawn but there are no orders placed.    Per chart review, pt has been working with diet/Bagdade on surgery clearance but has an upcoming [place] appt with RF on 7/24/24 and therefore no labs are currently ordered.    Will route to provider to confirm no lab work currently needed until pt is seen on 7/24/24.    Cindi JIMENEZ RN

## 2024-07-11 NOTE — TELEPHONE ENCOUNTER
Returned patient call - informed her no labs currently needed until visit 7/24/24.    Patient states she was told by the dietician that she needed to have labs drawn.    Apologized for confusing messaging.    Patient states she will wait for appt.    Cindi JIMENEZ RN

## 2024-07-24 ENCOUNTER — OFFICE VISIT (OUTPATIENT)
Dept: SURGERY | Facility: CLINIC | Age: 39
End: 2024-07-24
Payer: COMMERCIAL

## 2024-07-24 VITALS
BODY MASS INDEX: 45.99 KG/M2 | WEIGHT: 293 LBS | SYSTOLIC BLOOD PRESSURE: 115 MMHG | DIASTOLIC BLOOD PRESSURE: 78 MMHG | HEIGHT: 67 IN | HEART RATE: 88 BPM | OXYGEN SATURATION: 96 %

## 2024-07-24 DIAGNOSIS — Z13.0 SCREENING FOR IRON DEFICIENCY ANEMIA: ICD-10-CM

## 2024-07-24 DIAGNOSIS — G47.33 OSA (OBSTRUCTIVE SLEEP APNEA): ICD-10-CM

## 2024-07-24 DIAGNOSIS — Z13.228 SCREENING FOR ENDOCRINE, NUTRITIONAL, METABOLIC AND IMMUNITY DISORDER: ICD-10-CM

## 2024-07-24 DIAGNOSIS — E66.01 MORBID OBESITY (H): Primary | ICD-10-CM

## 2024-07-24 DIAGNOSIS — Z13.29 SCREENING FOR ENDOCRINE, NUTRITIONAL, METABOLIC AND IMMUNITY DISORDER: ICD-10-CM

## 2024-07-24 DIAGNOSIS — Z13.0 SCREENING FOR ENDOCRINE, NUTRITIONAL, METABOLIC AND IMMUNITY DISORDER: ICD-10-CM

## 2024-07-24 DIAGNOSIS — Z13.21 SCREENING FOR ENDOCRINE, NUTRITIONAL, METABOLIC AND IMMUNITY DISORDER: ICD-10-CM

## 2024-07-24 PROCEDURE — 99215 OFFICE O/P EST HI 40 MIN: CPT | Performed by: PHYSICIAN ASSISTANT

## 2024-07-24 NOTE — LETTER
July 24, 2024      Melinda Bowens  [address] [address] [address]        Bariatric Task List        Required Weight loss:    Lose 5 lbs prior to surgery.  Goal Weight: 298 lbs    Tasks:    Have preoperative laboratory tests drawn.     Psychological Evaluation with MMPI and clearance for weight loss surgery. - completed with Shayne    Achieve clearance from dietitian to see surgeon.    [facility]    [employer] online seminar    Letter of support from primary from a mental health perspective    Complete bariatric education        Sincerely,      Amy Nicholson PA-C

## 2024-07-24 NOTE — PROGRESS NOTES
New Bariatric Surgery Consultation NoteNew Bariatric Surgery Consultation Note          2024    RE: Melinda Bowens  MR#: [id number]  : 1985      Referring provider:       2024    11:02 AM   --   Who referred you [facility]       Chief Complaint/Reason for visit: evaluation for possible weight loss surgery    Dear No Ref-Primary, Physician (General),    I had the pleasure of seeing your patient, Melinda Bwoens, to evaluate her obesity and consider her for possible weight loss surgery.     Initially seen 2022 for a bariatric evaluation. Was not sure at that time that she wanted surgery so has participated in the [facility] program.      Assessment & Plan   Problem List Items Addressed This Visit       Morbid obesity (H) - Primary    Relevant Orders    NUTRITION REFERRAL     Other Visit Diagnoses       Screening for iron deficiency anemia        Relevant Orders    CBC with platelets (Completed)    Screening for endocrine, nutritional, metabolic and immunity disorder        Relevant Orders    Comprehensive metabolic panel (Completed)    Hemoglobin A1c (Completed)    Vitamin D Deficiency (Completed)             In summary, Melinda Bowens has Class III obesity with a  Body mass index is 47.46 kg/m . and the comorbidities stated above. She completed an informational seminar and is a possible candidate for bariatric surgery. She will complete the tasklist below. Once complete she will see the surgeon for consultation for bariatric surgery. Pt verbalizes understanding of the process to surgery and the post operative schedule. All questions were answered.     BARIATRIC TASK LIST  Lose 5 lbs prior to surgery. Goal Weight: 298 lbs  Have preoperative laboratory tests drawn.   Psychological Evaluation with MMPI and clearance for weight loss surgery. - completed with Shayne  Achieve clearance from dietitian to see surgeon. - will need to see until goal weight  Sleep clearance from [facility]  Call  insurance for requirements  Rewatch online seminar  Letter of support from primary from a mental health perspective  Complete bariatric education        Pt to follow up in the next 12 wks with me. We will discuss the available weight loss surgeries including risks and benefits, get an official weight, review tasklist, and do a physical exam.     40 minutes spent on the date of the encounter doing chart review, review of test results, patient visit and documentation        HISTORY OF PRESENT ILLNESS:  Weight Loss History Reviewed with Patient    How long have you been overweight?    What is the most that you have ever weighed?    What is the most weight you have lost?    I have tried the following methods to lose weight    I have tried the following weight loss medications? (Check all that apply)    Have you worked with any weight loss programs previously for weight loss or weight loss surgery?    Have you ever had weight loss surgery?        CO-MORBIDITIES OF OBESITY INCLUDE:        I have the following health issues associated with obesity:        PAST MEDICAL HISTORY:  Past Medical History:   Diagnosis Date    Anxiety     Asthma     childhood    Calculus, kidney 2012    Depressive disorder     Endometriosis of pelvic peritoneum     Hyperlipidemia 2013    Met with CE 2013. Decreased LDL to wnl through lifestyle changes only.     Irritable bowel syndrome     Lichen sclerosus et atrophicus     Migraines     MRI with neurology, normal 3/2013. Starting on new medication, thinks it is topomax.     Morbid obesity with BMI of 45.0-49.9, adult (H)     PONV (postoperative nausea and vomiting)        PAST SURGICAL HISTORY:  Past Surgical History:   Procedure Laterality Date     SECTION  ,     CHOLECYSTECTOMY  2008    DAVINCI LAPAROSCOPIC CERVICECTOMY (TRACHELECTOMY) N/A 2022    Procedure: robotic removal of cervix, cystoscopy;  Surgeon: Mary Beth Mccord MD;  Location:  OR     DILATION AND CURETTAGE  09/24/2008, 04/30/2009 9/08-inactive and secretory endometrium; 4/09-secretory endometrium    LAPAROSCOPIC HYSTERECTOMY SUPRACERVICAL, BILATERAL SALPINGO-OOPHORECTOMY, COMBINED  11/11/2009    inactive endometrium;bilateral endometriosis in right and left ovaries    LAPAROSCOPY DIAGNOSTIC (GYN)  2005, 2007, 2009    TONSILLECTOMY  08/2010     No personal or family history of blood clots or anesthesia concerns    Had hysterectomy    SOCIAL HISTORY:       Are you employed, what is your occupation    Which best describes your marital status:    Do you have children? 3 kiddos at home   Who can you count on for support throughout your weight loss surgery journey and to help you for 1st week following surgery Oldest daughter age 21    Can you afford 3 meals a day?     Can you afford 40 dollars a month for vitamins?      HABITS:        How often do you drink alcohol? No    If you do drink alcohol, how many drinks might you have in a day? (one drink = 5 oz. wine, 1 can/bottle of beer, 1 shot liquor)    Have you ever used any of the following nicotine products? No    Have you or are you currently using street drugs or prescription strength medication for which you do not have a prescription for? No   Do you have a history of chemical dependency (alcohol or drug abuse)? No     PSYCHOLOGICAL HISTORY:       Have you ever attempted suicide? In high school. 3 attempts cutting and overdosing on meds   Have you had thoughts of suicide in the past year? No   Have you ever been hospitalized for mental illness or a suicide attempt?    Are you currently seeing a therapist or counselor?  Prn therapist. Last visit was a year ago.   Primary care provides mental health medication    Are you currently seeing a psychiatrist?      ROS        Skin:  Denies rashes, intertrigo,    HEENT: Denies Asthma, allergies, thyroid problems   Musculoskeletal: Denies Joint Pain, Back pain, LE edema   Cardiovascular: Denies  Chest Pain, Palpitations   Pulmonary: Denies Shortness of breath with activity, Snoring, Dyspnea  - Has MAIA diagnosed 3 years ago by neurologist   Gastrointestinal: Denies Nausea, Vomiting, Diarrhea, Constipation   Genitourinary: Denies stress incontinence, kidney stones    Hematological: Denies hx of clotting disorders, DVT, anemia   Neurological: Denies headaches, seizures   Female only: Denies Polycystic ovarian syndrome (PCOS),  menstrual irregularities     Has MAIA diagnosed 3 years ago by neurologist. Did not get CPAP as thought it was mild.   [facility] neurology visit was oct 2023. Insurance would not cover CPAP. Was told could rent it.   Waking up with headaches.   Also seen neurology for migraines. Gets infusions. Takes zofran for nausea related to headaches      EATING BEHAVIORS:        Have you or anyone else thought that you had an eating disorder? No   Do you currently binge eat? No   Are you an emotional eater?    Do you get up to eat after falling asleep?      EXERCISE:        How often do you exercise?    What long (in minutes)?    What types of exercise do you do?    What keeps you from being more active?       MEDICATIONS:  Current Outpatient Medications   Medication Sig Dispense Refill    betamethasone valerate (VALISONE) 0.1 % external cream Apply topically 2 times daily 42.5 g 0    buPROPion (WELLBUTRIN XL) 300 MG 24 hr tablet TAKE 1 TABLET (300 MG) BY MOUTH ONCE DAILY.      COMPOUNDED NON-CONTROLLED SUBSTANCE (CMPD RX) - PHARMACY TO MIX COMPOUNDED MEDICATION Bi-est: Estriol 0.4mg, Estradiol 0.1mg. Place 1 gram vaginally at bedtime three times per week with finger 60 g 1    conjugated estrogens (PREMARIN) 0.625 MG/GM vaginal cream Place 1 g vaginally At Bedtime For 30 nights, then three times per week thereafter. Use finger for application. 30 g 3    nortriptyline (PAMELOR) 75 MG capsule TAKE 1 CAPSULE BY MOUTH EVERYDAY AT BEDTIME      omeprazole (PRILOSEC) 40 MG DR capsule Take 40 mg by mouth       "ondansetron (ZOFRAN-ODT) 4 MG ODT tab       phentermine (ADIPEX-P) 37.5 MG tablet Take 1 tablet every morning with breakfast. 90 tablet 0    topiramate (TOPAMAX) 50 MG tablet 1/2 TABLET IN THE EVENING. YOU MAY INCREASE TO 1 FULL TABLET AFTER 1 WEEK. 90 tablet 1    traZODone (DESYREL) 100 MG tablet Take 100 mg by mouth At Bedtime      ubrogepant (UBRELVY) 100 MG tablet Take 100 mg by mouth at onset of headache      venlafaxine (EFFEXOR) 75 MG tablet TAKE 1 TABLET BY MOUTH TWICE A DAY      Vitamin D (Cholecalciferol) 25 MCG (1000 UT) CAPS Take 1,000 Units by mouth daily      acetaminophen (TYLENOL) 325 MG tablet Take 2 tablets (650 mg) by mouth every 6 hours as needed for mild pain 24 tablet 0    insulin pen needle (31G X 5 MM) 31G X 5 MM miscellaneous Use 1 pen needle daily or as directed. (Patient not taking: Reported on 7/24/2024) 100 each 1    liraglutide - Weight Management (SAXENDA) 18 MG/3ML pen Week 1: Inject 0.6 mg subcutaneously daily: Week 2: Inject 1.2 mg daily: Week 3: Inject 1.8 mg daily: Week 4: Inject 2.4 mg daily: Week 5 and on: Inject 3.0 mg daily.  Only increase dose if/when having minimal side effects at current dose (Patient not taking: Reported on 7/24/2024) 15 mL 1    oxyCODONE-Acetaminophen 5-300 MG TABS  (Patient not taking: Reported on 12/13/2023)      rizatriptan (MAXALT) 10 MG tablet TAKE 1 TAB BY MOUTH AT ONSET OF MIGRAINE.MAY REPEAT IN 2 HOURS.MAX 2TABS/24HR (Patient not taking: Reported on 12/14/2023)       No current facility-administered medications for this visit.        LABS AND RECORDS REVIEWED:  Labs reviewed in [facility]    PHYSICAL EXAM:  /78   Pulse 88   Ht 5' 7\" (1.702 m)   Wt 303 lb (137.4 kg)   SpO2 96%   BMI 47.46 kg/m    GENERAL:  Good development and normal affect in no acute distress. Missing 1 tooth left upper side  CARDIOVASCULAR:  Regular rate and rhythm without murmurs, rubs, or gallops.  RESPIRATORY: Lungs are clear to auscultation bilaterally with good breath " sounds.  GASTROINTESTINAL: Abdomen is obese, nondistended, soft, nontender, without organomegaly or masses.  LOWER EXTREMITIES: No LE edema bilaterally, no cyanosis, ulceration, or chronic venous stasis noted.  MUSCULOSKELETAL:  Moves all 4 extremities equal and strong. Has a normal gait.   NEUROLOGIC: Cranial nerves II-XII grossly intact.  SKIN: No intertriginous irritation or rash.

## 2024-07-28 ENCOUNTER — LAB (OUTPATIENT)
Dept: LAB | Facility: CLINIC | Age: 39
End: 2024-07-28
Payer: COMMERCIAL

## 2024-07-28 DIAGNOSIS — Z13.0 SCREENING FOR ENDOCRINE, NUTRITIONAL, METABOLIC AND IMMUNITY DISORDER: ICD-10-CM

## 2024-07-28 DIAGNOSIS — Z13.29 SCREENING FOR ENDOCRINE, NUTRITIONAL, METABOLIC AND IMMUNITY DISORDER: ICD-10-CM

## 2024-07-28 DIAGNOSIS — Z13.21 SCREENING FOR ENDOCRINE, NUTRITIONAL, METABOLIC AND IMMUNITY DISORDER: ICD-10-CM

## 2024-07-28 DIAGNOSIS — Z13.0 SCREENING FOR IRON DEFICIENCY ANEMIA: ICD-10-CM

## 2024-07-28 DIAGNOSIS — Z13.228 SCREENING FOR ENDOCRINE, NUTRITIONAL, METABOLIC AND IMMUNITY DISORDER: ICD-10-CM

## 2024-07-28 LAB
ERYTHROCYTE [DISTWIDTH] IN BLOOD BY AUTOMATED COUNT: 13.1 % (ref 10–15)
HBA1C MFR BLD: 5.9 % (ref 0–5.6)
HCT VFR BLD AUTO: 41.3 % (ref 35–47)
HGB BLD-MCNC: 12.9 G/DL (ref 11.7–15.7)
MCH RBC QN AUTO: 26.4 PG (ref 26.5–33)
MCHC RBC AUTO-ENTMCNC: 31.2 G/DL (ref 31.5–36.5)
MCV RBC AUTO: 85 FL (ref 78–100)
PLATELET # BLD AUTO: 267 10E3/UL (ref 150–450)
RBC # BLD AUTO: 4.88 10E6/UL (ref 3.8–5.2)
WBC # BLD AUTO: 7.9 10E3/UL (ref 4–11)

## 2024-07-28 PROCEDURE — 82306 VITAMIN D 25 HYDROXY: CPT

## 2024-07-28 PROCEDURE — 83036 HEMOGLOBIN GLYCOSYLATED A1C: CPT

## 2024-07-28 PROCEDURE — 36415 COLL VENOUS BLD VENIPUNCTURE: CPT

## 2024-07-28 PROCEDURE — 80053 COMPREHEN METABOLIC PANEL: CPT

## 2024-07-28 PROCEDURE — 85027 COMPLETE CBC AUTOMATED: CPT

## 2024-07-29 LAB
ALBUMIN SERPL BCG-MCNC: 4 G/DL (ref 3.5–5.2)
ALP SERPL-CCNC: 91 U/L (ref 40–150)
ALT SERPL W P-5'-P-CCNC: 20 U/L (ref 0–50)
ANION GAP SERPL CALCULATED.3IONS-SCNC: 9 MMOL/L (ref 7–15)
AST SERPL W P-5'-P-CCNC: 19 U/L (ref 0–45)
BILIRUB SERPL-MCNC: 0.2 MG/DL
BUN SERPL-MCNC: 15.4 MG/DL (ref 6–20)
CALCIUM SERPL-MCNC: 9 MG/DL (ref 8.8–10.4)
CHLORIDE SERPL-SCNC: 109 MMOL/L (ref 98–107)
CREAT SERPL-MCNC: 0.85 MG/DL (ref 0.51–0.95)
EGFRCR SERPLBLD CKD-EPI 2021: 88 ML/MIN/1.73M2
GLUCOSE SERPL-MCNC: 93 MG/DL (ref 70–99)
HCO3 SERPL-SCNC: 22 MMOL/L (ref 22–29)
POTASSIUM SERPL-SCNC: 3.9 MMOL/L (ref 3.4–5.3)
PROT SERPL-MCNC: 6.4 G/DL (ref 6.4–8.3)
SODIUM SERPL-SCNC: 140 MMOL/L (ref 135–145)
VIT D+METAB SERPL-MCNC: 42 NG/ML (ref 20–50)

## 2024-07-30 ENCOUNTER — HOSPITAL ENCOUNTER (OUTPATIENT)
Dept: MAMMOGRAPHY | Facility: CLINIC | Age: 39
Discharge: HOME | End: 2024-07-30
Admitting: NURSE PRACTITIONER
Payer: COMMERCIAL

## 2024-07-30 DIAGNOSIS — Z12.31 VISIT FOR SCREENING MAMMOGRAM: ICD-10-CM

## 2024-07-30 PROCEDURE — 77063 BREAST TOMOSYNTHESIS BI: CPT

## 2024-07-30 NOTE — PROGRESS NOTES
"Melinda is a 38 year old who is being evaluated via a billable video visit.      The patient has been notified of following:     \"This video visit will be conducted via a call between you and your physician/provider. We have found that certain health care needs can be provided without the need for an in-person physical exam.  This service lets us provide the care you need with a video conversation.  If a prescription is necessary we can send it directly to your pharmacy.  If lab work is needed we can place an order for that and you can then stop by our lab to have the test done at a later time.    Video visits are billed at different rates depending on your insurance coverage.  Please reach out to your insurance provider with any questions.    If during the course of the call the physician/provider feels a video visit is not appropriate, you will not be charged for this service.\"    Patient has given verbal consent for Video visit? Yes    How would you like to obtain your AVS? MyChart  MY CHartText to cell phone: [phone]    Will anyone else be joining your video visit? No    I    Video-Visit Details    Type of service:  Video Visit    Originating Location (pt. Location): Home    Distant Location (provider location):   [facility]    Platform used for Video Visit: SeeToo    Video Start Time: 3:35 PM    Video End Time:4:00 PM        Pre Op Bariatric Surgery note      2024     RE: Melinda Bowens  MR#: [id number]  : 1985         Patient seen last 2024 to restart the bariatric surgical process after initially being seen .  Task list was updated. Being seen today to complete the preoperative bariatric education.      BARIATRIC TASK LIST  Lose 5 lbs prior to surgery. Goal Weight: 298 lbs  Have preoperative laboratory tests drawn. - completed  Psychological Evaluation with MMPI and clearance for weight loss surgery. - completed with Shayne Wayne " "clearance from dietitian to see surgeon. - will need to see until goal weight  Sleep clearance from [facility] - has a message out to them  Call insurance for requirements - has a call out  Rewatch online seminar - completed  Letter of support from primary from a mental health perspective - has appointment beginning of september  Complete bariatric education - completed        Ht 5' 7\" (1.702 m)   Wt 303 lb (137.4 kg)   BMI 47.46 kg/m    GENERAL: Healthy, alert and no distress  EYES: Eyes grossly normal to inspection.  No discharge or erythema, or obvious scleral/conjunctival abnormalities.  RESP: No audible wheeze, cough, or visible cyanosis.  No visible retractions or increased work of breathing.    SKIN: Visible skin clear. No significant rash, abnormal pigmentation or lesions.  NEURO: Cranial nerves grossly intact.  Mentation and speech appropriate for age.  PSYCH: Mentation appears normal, affect normal/bright, judgement and insight intact, normal speech and appearance well-groomed.      ASSESSMENT/PLAN:  Morbid Obesity        Today in the office we discussed gastric bypass surgery. Preoperative, perioperative, and postoperative processes, management, and follow up were addressed.  Risks and benefits were outlined including the risk of death, PE, DVT, ulcer, N/V, stricture, hernia, wound infection, weight regain, and vitamin deficiencies. I emphasized exercise and activity along with appropriate food choice as the main foundation for weight loss with surgery providing surgical reinforcement of this.  A goal sheet and support group handout were given to the patient. Patient contract was signed.     Once the patient has completed their task list and there are no further recommendations, they will see the surgeon for consultation for bariatric surgery.  All questions were answered. Patient verbalizes understanding of the process to surgery and expectations for the postoperative period including the need for lifelong " lifestyle changes, vitamin supplementation, and laboratory monitoring.      Amy Nicholson MS, PA-C    30 minutes spent on the date of the encounter doing chart review, review of test results, patient visit and documentation

## 2024-07-31 ENCOUNTER — VIRTUAL VISIT (OUTPATIENT)
Dept: SURGERY | Facility: CLINIC | Age: 39
End: 2024-07-31
Payer: COMMERCIAL

## 2024-07-31 VITALS — HEIGHT: 67 IN | BODY MASS INDEX: 45.99 KG/M2 | WEIGHT: 293 LBS

## 2024-07-31 DIAGNOSIS — E66.01 MORBID OBESITY (H): Primary | ICD-10-CM

## 2024-07-31 PROBLEM — G47.33 OSA (OBSTRUCTIVE SLEEP APNEA): Status: ACTIVE | Noted: 2024-07-31

## 2024-07-31 PROCEDURE — 99214 OFFICE O/P EST MOD 30 MIN: CPT | Mod: 95 | Performed by: PHYSICIAN ASSISTANT

## 2024-07-31 NOTE — PATIENT INSTRUCTIONS
"Nice to talk with you today! Thank you for allowing me the privilege of caring for you.   We hope we provided you with the excellent service you deserve.     To ensure the quality of our services you may receive a patient satisfaction survey from an independent monitoring company.  The greatest compliment you can give is \"Likely to Recommend\"      Below is our plan we discussed.-  RASHMI Reyes      Continue working on task list items    If you need to reach me you can do so by calling [phone].    Have a great day!     "

## 2024-11-06 ENCOUNTER — VIRTUAL VISIT (OUTPATIENT)
Dept: URGENT CARE | Facility: CLINIC | Age: 39
End: 2024-11-06
Payer: COMMERCIAL

## 2024-11-06 DIAGNOSIS — R21 RASH: Primary | ICD-10-CM

## 2024-11-06 PROCEDURE — 99213 OFFICE O/P EST LOW 20 MIN: CPT | Mod: 95 | Performed by: NURSE PRACTITIONER

## 2024-11-06 NOTE — PROGRESS NOTES
Melinda is a 39 year old who is being evaluated via a billable video visit.    How would you like to obtain your AVS? MyChart  If the video visit is dropped, the invitation should be resent by: Text to cell phone: [phone]  Will anyone else be joining your video visit? No      Assessment & Plan     (R21) Rash  (primary encounter diagnosis)    Plan: Advised to follow up with pcp   Does not appear like athletes foot contact dermatitis or anything treatable or urgent today  Could be vascular? Appears like petechiae  She will follow up with pcp for in person evaluation and further workup/labs if deemed necessary.         MARYANNE Villanueva CNP      Subjective   Melinda is a 39 year old, presenting for the following health issues:  Rash    HPI     Red spots on bottom of both feet 2.5 weeks ago  Red dots not raised. Had dry skin  Do not itch  No injury  No new contacts  Does feel like she has poor circulation wears compressions stockings  No drainage no fever  Has tried hydrating lotion has not helped it.   No insect bites  Not spreading or appears contagious.   No clotting disorder.         Objective           Vitals:  No vitals were obtained today due to virtual visit.    Physical Exam   GENERAL: alert and no distress  SKIN: flat non itchy rash bottom of both feet not infected       Video-Visit Details  Time started: 4:30 pm  Time ended: 5:00 PM  Type of service:  Video Visit   Originating Location (pt. Location): Home    Distant Location (provider location):  Off-site  Platform used for Video Visit: Steve  Signed Electronically by: [facility]

## 2024-11-26 ENCOUNTER — HOSPITAL ENCOUNTER (EMERGENCY)
Facility: CLINIC | Age: 39
Discharge: HOME | End: 2024-11-26
Attending: EMERGENCY MEDICINE | Admitting: EMERGENCY MEDICINE
Payer: COMMERCIAL

## 2024-11-26 ENCOUNTER — APPOINTMENT (OUTPATIENT)
Dept: GENERAL RADIOLOGY | Facility: CLINIC | Age: 39
End: 2024-11-26
Attending: EMERGENCY MEDICINE
Payer: COMMERCIAL

## 2024-11-26 ENCOUNTER — HOSPITAL ENCOUNTER (EMERGENCY)
Facility: CLINIC | Age: 39
Discharge: HOME | End: 2024-11-26
Payer: COMMERCIAL

## 2024-11-26 VITALS
BODY MASS INDEX: 45.99 KG/M2 | OXYGEN SATURATION: 97 % | TEMPERATURE: 97.7 F | RESPIRATION RATE: 16 BRPM | HEART RATE: 86 BPM | SYSTOLIC BLOOD PRESSURE: 114 MMHG | HEIGHT: 67 IN | WEIGHT: 293 LBS | DIASTOLIC BLOOD PRESSURE: 54 MMHG

## 2024-11-26 DIAGNOSIS — R07.1 CHEST PAIN ON BREATHING: ICD-10-CM

## 2024-11-26 LAB
ANION GAP SERPL CALCULATED.3IONS-SCNC: 13 MMOL/L (ref 7–15)
ATRIAL RATE - MUSE: 85 BPM
BASOPHILS # BLD AUTO: 0 10E3/UL (ref 0–0.2)
BASOPHILS NFR BLD AUTO: 0 %
BUN SERPL-MCNC: 8.5 MG/DL (ref 6–20)
CALCIUM SERPL-MCNC: 8.9 MG/DL (ref 8.8–10.4)
CHLORIDE SERPL-SCNC: 106 MMOL/L (ref 98–107)
CREAT SERPL-MCNC: 0.87 MG/DL (ref 0.51–0.95)
D DIMER PPP FEU-MCNC: <0.27 UG/ML FEU (ref 0–0.5)
DIASTOLIC BLOOD PRESSURE - MUSE: NORMAL MMHG
EGFRCR SERPLBLD CKD-EPI 2021: 85 ML/MIN/1.73M2
EOSINOPHIL # BLD AUTO: 0.2 10E3/UL (ref 0–0.7)
EOSINOPHIL NFR BLD AUTO: 2 %
ERYTHROCYTE [DISTWIDTH] IN BLOOD BY AUTOMATED COUNT: 13.4 % (ref 10–15)
GLUCOSE SERPL-MCNC: 116 MG/DL (ref 70–99)
HCO3 SERPL-SCNC: 22 MMOL/L (ref 22–29)
HCT VFR BLD AUTO: 42.5 % (ref 35–47)
HGB BLD-MCNC: 13.7 G/DL (ref 11.7–15.7)
HOLD SPECIMEN: NORMAL
IMM GRANULOCYTES # BLD: 0 10E3/UL
IMM GRANULOCYTES NFR BLD: 0 %
INTERPRETATION ECG - MUSE: NORMAL
LYMPHOCYTES # BLD AUTO: 1.9 10E3/UL (ref 0.8–5.3)
LYMPHOCYTES NFR BLD AUTO: 27 %
MCH RBC QN AUTO: 26.7 PG (ref 26.5–33)
MCHC RBC AUTO-ENTMCNC: 32.2 G/DL (ref 31.5–36.5)
MCV RBC AUTO: 83 FL (ref 78–100)
MONOCYTES # BLD AUTO: 0.4 10E3/UL (ref 0–1.3)
MONOCYTES NFR BLD AUTO: 6 %
NEUTROPHILS # BLD AUTO: 4.3 10E3/UL (ref 1.6–8.3)
NEUTROPHILS NFR BLD AUTO: 64 %
NRBC # BLD AUTO: 0 10E3/UL
NRBC BLD AUTO-RTO: 0 /100
P AXIS - MUSE: 54 DEGREES
PLATELET # BLD AUTO: 284 10E3/UL (ref 150–450)
POTASSIUM SERPL-SCNC: 3.8 MMOL/L (ref 3.4–5.3)
PR INTERVAL - MUSE: 162 MS
QRS DURATION - MUSE: 102 MS
QT - MUSE: 378 MS
QTC - MUSE: 449 MS
R AXIS - MUSE: 2 DEGREES
RBC # BLD AUTO: 5.13 10E6/UL (ref 3.8–5.2)
SODIUM SERPL-SCNC: 141 MMOL/L (ref 135–145)
SYSTOLIC BLOOD PRESSURE - MUSE: NORMAL MMHG
T AXIS - MUSE: 53 DEGREES
TROPONIN T SERPL HS-MCNC: <6 NG/L
VENTRICULAR RATE- MUSE: 85 BPM
WBC # BLD AUTO: 6.8 10E3/UL (ref 4–11)

## 2024-11-26 PROCEDURE — 85004 AUTOMATED DIFF WBC COUNT: CPT | Performed by: EMERGENCY MEDICINE

## 2024-11-26 PROCEDURE — 85041 AUTOMATED RBC COUNT: CPT | Performed by: EMERGENCY MEDICINE

## 2024-11-26 PROCEDURE — 80048 BASIC METABOLIC PNL TOTAL CA: CPT | Performed by: EMERGENCY MEDICINE

## 2024-11-26 PROCEDURE — 85379 FIBRIN DEGRADATION QUANT: CPT | Performed by: EMERGENCY MEDICINE

## 2024-11-26 PROCEDURE — 36415 COLL VENOUS BLD VENIPUNCTURE: CPT | Performed by: EMERGENCY MEDICINE

## 2024-11-26 PROCEDURE — 93005 ELECTROCARDIOGRAM TRACING: CPT

## 2024-11-26 PROCEDURE — 84520 ASSAY OF UREA NITROGEN: CPT | Performed by: EMERGENCY MEDICINE

## 2024-11-26 PROCEDURE — 84484 ASSAY OF TROPONIN QUANT: CPT | Performed by: EMERGENCY MEDICINE

## 2024-11-26 PROCEDURE — 99285 EMERGENCY DEPT VISIT HI MDM: CPT | Mod: 25

## 2024-11-26 PROCEDURE — 250N000013 HC RX MED GY IP 250 OP 250 PS 637: Performed by: EMERGENCY MEDICINE

## 2024-11-26 PROCEDURE — 71046 X-RAY EXAM CHEST 2 VIEWS: CPT

## 2024-11-26 RX ORDER — SUCRALFATE 1 G/1
1 TABLET ORAL 4 TIMES DAILY
Qty: 15 TABLET | Refills: 0 | Status: SHIPPED | OUTPATIENT
Start: 2024-11-26

## 2024-11-26 RX ORDER — ASPIRIN 81 MG/1
324 TABLET, CHEWABLE ORAL ONCE
Status: COMPLETED | OUTPATIENT
Start: 2024-11-26 | End: 2024-11-26

## 2024-11-26 RX ADMIN — ASPIRIN 81 MG 324 MG: 81 TABLET ORAL at 15:27

## 2024-11-26 ASSESSMENT — COLUMBIA-SUICIDE SEVERITY RATING SCALE - C-SSRS
2. HAVE YOU ACTUALLY HAD ANY THOUGHTS OF KILLING YOURSELF IN THE PAST MONTH?: NO
1. IN THE PAST MONTH, HAVE YOU WISHED YOU WERE DEAD OR WISHED YOU COULD GO TO SLEEP AND NOT WAKE UP?: NO
6. HAVE YOU EVER DONE ANYTHING, STARTED TO DO ANYTHING, OR PREPARED TO DO ANYTHING TO END YOUR LIFE?: NO

## 2024-11-26 ASSESSMENT — ACTIVITIES OF DAILY LIVING (ADL)
ADLS_ACUITY_SCORE: 41
ADLS_ACUITY_SCORE: 41

## 2024-11-26 NOTE — ED TRIAGE NOTES
Pt complains of cp that started 2 hrs PTA while working on computer. No sob. Pt states it was painful to take a deep breath. No cardiac hx.

## 2024-11-26 NOTE — ED PROVIDER NOTES
"  Emergency Department Note      History of Present Illness     Chief Complaint   Chest Pain    HPI   Melinda Bowens is a 39 year old female with a history of acid reflux presenting with chest pain that started suddenly when the patient was at work around 1300. The patient's pain is worse with deep breathing. She endorses the chest pain radiates through to the back. After 40 minutes, the pain improved on its own, but it returned 20 minutes later. He symptoms did not change with exertion. Melinda reports experiencing short episodes of room spinning dizziness when standing up for the past 2.5 weeks. She has a history of migraines and vertigo. No recent ill symptoms or leg edema. No recent travel. No birth control use.     Independent Historian   None    Review of External Notes   None    Past Medical History     Medical History and Problem List   Anxiety  Asthma  Calculus, kidney  Carpal tunnel syndrome   Depressive disorder  Endometriosis of pelvic peritoneum  Gastritis   GERD  IBS  Hyperlipidemia  Irritable bowel syndrome  Lichen sclerosus et atrophicus  Migraines  MAIA    Medications   Albuterol   Bupropion   Insulin   Nortriptyline   Omeprazole   Rizatriptan   Pantoprazole   Topiramate   Trazodone  Ubrogepant   Venlafaxine     Surgical History   C section   Cholecystectomy   Trachelectomy   Hysterectomy   Tonsillectomy   D&C  Ulnar nerve elbow surgery     Physical Exam     Patient Vitals for the past 24 hrs:   BP Temp Temp src Pulse Resp SpO2 Height Weight   11/26/24 1709 114/54 -- -- 86 16 97 % -- --   11/26/24 1540 -- -- -- -- -- 97 % -- --   11/26/24 1538 120/71 -- -- 89 -- -- -- --   11/26/24 1510 (!) 141/84 97.7  F (36.5  C) Temporal 93 17 99 % 1.702 m (5' 7\") 139.2 kg (306 lb 14.1 oz)     Physical Exam  Constitutional: Alert, attentive, GCS 15  HENT:    Nose: Nose normal.   Eyes: EOM are normal, anicteric, conjugate gaze  CV: regular rate and rhythm   Chest: Effort normal and breath sounds clear without " wheezing or rales, symmetric bilaterally   GI:  non tender. No distension. No guarding or rebound.    MSK: No LE edema, no tenderness to palpation of BLE.  Neurological: Alert, attentive, moving all extremities equally.   Skin: Skin is warm and dry.    Diagnostics     Lab Results   Labs Ordered and Resulted from Time of ED Arrival to Time of ED Departure   BASIC METABOLIC PANEL - Abnormal       Result Value    Sodium 141      Potassium 3.8      Chloride 106      Carbon Dioxide (CO2) 22      Anion Gap 13      Urea Nitrogen 8.5      Creatinine 0.87      GFR Estimate 85      Calcium 8.9      Glucose 116 (*)    D DIMER QUANTITATIVE - Normal    D-Dimer Quantitative <0.27     TROPONIN T, HIGH SENSITIVITY - Normal    Troponin T, High Sensitivity <6     CBC WITH PLATELETS AND DIFFERENTIAL    WBC Count 6.8      RBC Count 5.13      Hemoglobin 13.7      Hematocrit 42.5      MCV 83      MCH 26.7      MCHC 32.2      RDW 13.4      Platelet Count 284      % Neutrophils 64      % Lymphocytes 27      % Monocytes 6      % Eosinophils 2      % Basophils 0      % Immature Granulocytes 0      NRBCs per 100 WBC 0      Absolute Neutrophils 4.3      Absolute Lymphocytes 1.9      Absolute Monocytes 0.4      Absolute Eosinophils 0.2      Absolute Basophils 0.0      Absolute Immature Granulocytes 0.0      Absolute NRBCs 0.0       Imaging   Chest XR,  PA & LAT   Final Result   IMPRESSION: Negative chest. Lungs clear.      NANCI HUNTER MD            SYSTEM ID:  [id number]        EKG   ECG results from 11/26/24   EKG 12 lead     Value    Systolic Blood Pressure     Diastolic Blood Pressure     Ventricular Rate 85    Atrial Rate 85    SD Interval 162    QRS Duration 102        QTc 449    P Axis 54    R AXIS 2    T Axis 53    Interpretation ECG      Sinus rhythm  Normal ECG  When compared with ECG of 20-Jun-2020 16:29,  No significant change was found  EKG interpreted by me at 1514       Independent Interpretation   I personally reviewed  her your chest x-ray, pneumonia or pneumothorax.    ED Course      Medications Administered   Medications   aspirin (ASA) chewable tablet 324 mg (324 mg Oral $Given 11/26/24 1527)     Procedures   None     Discussion of Management   None    ED Course   ED Course as of 11/26/24 1711   Tue Nov 26, 2024   1519 I obtained the history and examined the patient as noted above.        Additional Documentation  None    Medical Decision Making / Diagnosis       JOLLY Bowens is a 39 year old female presenting for noncardiac sounding central chest pain worse with inspiration.  D-dimer is negative effectively ruling out PE and otherwise low risk patient.  EKG shows no ischemic changes, troponin is undetectably low with low suspicion for ACS.  There is no family history of sudden cardiac death or early MI.  Chest x-ray is clear, remainder of her labs are unremarkable.  Return precautions were reviewed, recommended PCP follow-up for further evaluation.  She does have a history of GERD, we will do a trial of Carafate and add on Pepcid and continue her omeprazole.    Disposition   The patient was discharged.     Diagnosis     ICD-10-CM    1. Chest pain on breathing  R07.1          Discharge Medications   New Prescriptions    SUCRALFATE (CARAFATE) 1 GM TABLET    Take 1 tablet (1 g) by mouth 4 times daily.     Boogie Brown MD  [employer]  5:12 PM 11/26/24     Scribe Disclosure:  I, Brooke Olivas, am serving as a scribe at 3:26 PM on 11/26/2024 to document services personally performed by Boogie Brown MD based on my observations and the provider's statements to me.        Boogie Brown MD  11/26/24 4880

## 2025-02-03 ENCOUNTER — OFFICE VISIT (OUTPATIENT)
Dept: OBGYN | Facility: CLINIC | Age: 40
End: 2025-02-03
Payer: COMMERCIAL

## 2025-02-03 VITALS
BODY MASS INDEX: 45.99 KG/M2 | WEIGHT: 293 LBS | SYSTOLIC BLOOD PRESSURE: 118 MMHG | HEIGHT: 67 IN | DIASTOLIC BLOOD PRESSURE: 72 MMHG

## 2025-02-03 DIAGNOSIS — N95.2 VAGINAL ATROPHY: ICD-10-CM

## 2025-02-03 DIAGNOSIS — Z01.419 ENCOUNTER FOR BREAST AND PELVIC EXAMINATION: Primary | ICD-10-CM

## 2025-02-03 DIAGNOSIS — N90.4 LICHEN SCLEROSUS ET ATROPHICUS OF THE VULVA: ICD-10-CM

## 2025-02-03 PROCEDURE — 99214 OFFICE O/P EST MOD 30 MIN: CPT | Mod: 25 | Performed by: NURSE PRACTITIONER

## 2025-02-03 PROCEDURE — 99459 PELVIC EXAMINATION: CPT | Performed by: NURSE PRACTITIONER

## 2025-02-03 PROCEDURE — 99396 PREV VISIT EST AGE 40-64: CPT | Performed by: NURSE PRACTITIONER

## 2025-02-03 RX ORDER — CLOBETASOL PROPIONATE 0.5 MG/G
OINTMENT TOPICAL 2 TIMES DAILY
Qty: 60 G | Refills: 4 | Status: SHIPPED | OUTPATIENT
Start: 2025-02-03

## 2025-05-07 ENCOUNTER — OFFICE VISIT (OUTPATIENT)
Dept: OBGYN | Facility: CLINIC | Age: 40
End: 2025-05-07
Payer: COMMERCIAL

## 2025-05-07 VITALS — DIASTOLIC BLOOD PRESSURE: 68 MMHG | SYSTOLIC BLOOD PRESSURE: 90 MMHG | WEIGHT: 293 LBS | BODY MASS INDEX: 46.78 KG/M2

## 2025-05-07 DIAGNOSIS — N90.4 LICHEN SCLEROSUS ET ATROPHICUS OF THE VULVA: ICD-10-CM

## 2025-05-07 DIAGNOSIS — N95.2 VAGINAL ATROPHY: Primary | ICD-10-CM

## 2025-05-07 PROCEDURE — G2211 COMPLEX E/M VISIT ADD ON: HCPCS

## 2025-05-07 PROCEDURE — 3074F SYST BP LT 130 MM HG: CPT

## 2025-05-07 PROCEDURE — 99214 OFFICE O/P EST MOD 30 MIN: CPT

## 2025-05-07 PROCEDURE — 3078F DIAST BP <80 MM HG: CPT

## 2025-05-07 PROCEDURE — 99459 PELVIC EXAMINATION: CPT

## 2025-05-07 RX ORDER — CLOBETASOL PROPIONATE 0.5 MG/G
OINTMENT TOPICAL 2 TIMES DAILY
Qty: 60 G | Refills: 4 | Status: SHIPPED | OUTPATIENT
Start: 2025-05-07

## 2025-05-07 RX ORDER — ESTRADIOL 0.1 MG/G
1 CREAM VAGINAL
Qty: 42.5 G | Refills: 4 | Status: SHIPPED | OUTPATIENT
Start: 2025-05-07

## 2025-05-07 NOTE — PROGRESS NOTES
SUBJECTIVE:                                                   Melinda Bowens is a 39 year old female who presents to clinic today for the following health issue(s):  Patient presents with:  Follow Up: 3 month follow up to [place]. Symptoms improving but not fully gone     HPI:  Melinda is here for follow up for lichen sclerosis see previously by Antonina Penny 2/3/25. Started on estradiol and clobetasol ointment [place]. Using estradiol cream during flares only.   Has made improvements but it is never fully resolved. Has significant discomfort with sex.     No LMP recorded. Patient has had a hysterectomy..     Patient is sexually active, .  Using hysterectomy for contraception.    reports that she has never smoked. She has never used smokeless tobacco.    STD testing offered?  Declined    Health maintenance updated:  yes    Today's PHQ-2 Score:       2/3/2025    11:28 AM   PHQ-2 (  [employer])   Q1: Little interest or pleasure in doing things 1   Q2: Feeling down, depressed or hopeless 0   PHQ-2 Score 1    Q1: Little interest or pleasure in doing things Several days   Q2: Feeling down, depressed or hopeless Not at all   PHQ-2 Score 1       Patient-reported     Today's PHQ-9 Score:       2022     1:24 PM   PHQ-9 SCORE   PHQ-9 Total Score 16     Today's BERTIN-7 Score:       2022     1:24 PM   BERTIN-7 SCORE   Total Score 15       Problem list and histories reviewed & adjusted, as indicated.  Additional history: as documented.    Patient Active Problem List   Diagnosis    Morbid obesity (H)    Cervical high risk HPV (human papillomavirus) test positive    Lichen sclerosus et atrophicus of the vulva    Common migraine    Depression with anxiety    History of gastroesophageal reflux (GERD)    IBS (irritable bowel syndrome)    Seasonal allergies    Post-op pain    Hip pain, unspecified laterality    MAIA (obstructive sleep apnea)     Past Surgical History:   Procedure Laterality Date     SECTION  ,  2008    CHOLECYSTECTOMY  02/06/2008    DAVINCI LAPAROSCOPIC CERVICECTOMY (TRACHELECTOMY) N/A 9/26/2022    Procedure: robotic removal of cervix, cystoscopy;  Surgeon: Mary Beth Mccord MD;  Location: [facility] OR    DILATION AND CURETTAGE  09/24/2008, 04/30/2009 9/08-inactive and secretory endometrium; 4/09-secretory endometrium    LAPAROSCOPIC HYSTERECTOMY SUPRACERVICAL, BILATERAL SALPINGO-OOPHORECTOMY, COMBINED  11/11/2009    inactive endometrium;bilateral endometriosis in right and left ovaries    LAPAROSCOPY DIAGNOSTIC (GYN)  2005, 2007, 2009    TONSILLECTOMY  08/2010      Social History     Tobacco Use    Smoking status: Never    Smokeless tobacco: Never   Substance Use Topics    Alcohol use: No     Alcohol/week: 0.0 standard drinks of alcohol      Problem (# of Occurrences) Relation (Name,Age of Onset)    Diabetes (2) Maternal Grandmother, Paternal Grandmother    Heart Disease (2) Maternal Grandmother, Paternal Grandfather    Hypertension (3) Mother, Maternal Grandmother, Other: Aunt, unspecified    Osteoporosis (1) Maternal Grandmother    Thyroid Disease (2) Mother, Maternal Grandmother    Hyperlipidemia (3) Mother, Maternal Grandmother, Other: Aunt, unspecified    Lung Cancer (1) Paternal Grandfather              Current Outpatient Medications   Medication Sig Dispense Refill    buPROPion (WELLBUTRIN XL) 300 MG 24 hr tablet TAKE 1 TABLET (300 MG) BY MOUTH ONCE DAILY.      clobetasol (TEMOVATE) 0.05 % external ointment Apply topically 2 times daily. To external genitalia. 2 weeks on 2 weeks off and repeat. 60 g 4    COMPOUNDED NON-CONTROLLED SUBSTANCE (CMPD RX) - PHARMACY TO MIX COMPOUNDED MEDICATION Bi-est: Estriol 0.4mg, Estradiol 0.1mg. Place 1 gram vaginally at bedtime three times per week with finger 60 g 1    conjugated estrogens (PREMARIN) 0.625 MG/GM vaginal cream Place 1 g vaginally At Bedtime For 30 nights, then three times per week thereafter. Use finger for application. 30 g 3    estradiol  (ESTRACE) 0.1 MG/GM vaginal cream Place 1 g vaginally three times a week. 42.5 g 4    nortriptyline (PAMELOR) 75 MG capsule TAKE 1 CAPSULE BY MOUTH EVERYDAY AT BEDTIME      omeprazole (PRILOSEC) 40 MG DR capsule Take 40 mg by mouth      ondansetron (ZOFRAN-ODT) 4 MG ODT tab       sucralfate (CARAFATE) 1 GM tablet Take 1 tablet (1 g) by mouth 4 times daily. 15 tablet 0    topiramate (TOPAMAX) 50 MG tablet 1/2 TABLET IN THE EVENING. YOU MAY INCREASE TO 1 FULL TABLET AFTER 1 WEEK. 90 tablet 1    traZODone (DESYREL) 100 MG tablet Take 100 mg by mouth At Bedtime      ubrogepant (UBRELVY) 100 MG tablet Take 100 mg by mouth at onset of headache      venlafaxine (EFFEXOR) 75 MG tablet TAKE 1 TABLET BY MOUTH TWICE A DAY      Vitamin D (Cholecalciferol) 25 MCG (1000 UT) CAPS Take 1,000 Units by mouth daily       No current facility-administered medications for this visit.     Allergies   Allergen Reactions    Amoxicillin Hives    Penicillins Hives       OBJECTIVE:     BP 90/68   Wt 135.9 kg (299 lb 9.6 oz)   BMI 46.78 kg/m    Body mass index is 46.78 kg/m .    Exam:  Constitutional:  Appearance: Well nourished, well developed alert, in no acute distress  Psychiatric:  Mentation appears normal and affect normal/bright.  Pelvic Exam:  External Genitalia:     Normal appearance for age, no discharge present, no tenderness present, no inflammatory lesions present, color normal. Fissure on right side of clitoris   Vagina:     Posterior introitus has ulcerated like lesion, erythema and white plaquing circumferential to vaginal opening  Bladder:     Nontender to palpation  Urethra:   Urethral Body:  Urethra palpation normal, urethra structural support normal   Urethral Meatus:  No erythema or lesions present  Cervix:     absent  Uterus:     absent  Adnexa:     absent  Perineum:     Perineum within normal limits, no evidence of trauma, no rashes or skin lesions present  Anus:     Anus within normal limits, no hemorrhoids  present  Inguinal Lymph Nodes:     No lymphadenopathy present  Pubic Hair:     Normal pubic hair distribution for age  Genitalia and Groin:     No rashes present, no lesions present, no areas of discoloration, no masses present     In-Clinic Test Results:  No results found for this or any previous visit (from the past 24 hours).    ASSESSMENT/PLAN:                                                        ICD-10-CM    1. Vaginal atrophy  N95.2 estradiol (ESTRACE) 0.1 MG/GM vaginal cream      2. Lichen sclerosus et atrophicus of the vulva  N90.4 clobetasol (TEMOVATE) 0.05 % external ointment     estradiol (ESTRACE) 0.1 MG/GM vaginal cream          There are no Patient Instructions on file for this visit.    -recommend increasing clobetasol ointment to every day  -estradiol 3 times a week  -RTC for follow up in 3 mo    RASHMI Cortez [facility] [place]

## (undated) DEVICE — GLOVE PROTEXIS BLUE W/NEU-THERA 7.0  2D73EB70

## (undated) DEVICE — DAVINCI XI DRAPE ARM 470015

## (undated) DEVICE — SU VICRYL 0 TIE 54" J608H

## (undated) DEVICE — DEVICE SUTURE PASSER 14GA WECK EFX EFXSP2

## (undated) DEVICE — DAVINCI XI DRAPE COLUMN 470341

## (undated) DEVICE — NDL INSUFFLATION 13GA 120MM C2201

## (undated) DEVICE — SU VICRYL 4-0 PS-2 18" UND J496H

## (undated) DEVICE — DAVINCI XI OBTURATOR BLADELESS 8MM 470359

## (undated) DEVICE — ESU GROUND PAD ADULT REM W/15' CORD E7507DB

## (undated) DEVICE — LINEN TOWEL PACK X6 WHITE 5487

## (undated) DEVICE — ENDO TROCAR FIRST ENTRY KII FIOS Z-THRD 05X100MM CTF03

## (undated) DEVICE — JELLY LUBRICATING SURGILUBE 2OZ TUBE

## (undated) DEVICE — SUCTION IRR STRYKERFLOW II W/TIP 250-070-520

## (undated) DEVICE — PACK GOWN 3/PK DISP XL SBA32GPFCB

## (undated) DEVICE — LINEN TOWEL PACK X30 5481

## (undated) DEVICE — PREP CHLORAPREP 26ML TINTED HI-LITE ORANGE 930815

## (undated) DEVICE — DRAPE SHEET REV FOLD 3/4 9349

## (undated) DEVICE — SYSTEM LAPAROVUE VISIBILITY LAPVUE10

## (undated) DEVICE — DAVINCI XI SEAL UNIVERSAL 5-8MM 470361

## (undated) DEVICE — GLOVE PROTEXIS POWDER FREE SMT 6.5  2D72PT65X

## (undated) DEVICE — TUBING SMOKE EVAC PNEUMOCLEAR HIGH FLOW 0620050250

## (undated) DEVICE — KOH COLPOTOMIZER OCCLUDER  CPO-6

## (undated) DEVICE — DAVINCI HOT SHEARS TIP COVER  400180

## (undated) DEVICE — DRAPE MAYO STAND 23X54 8337

## (undated) DEVICE — PREP SCRUB SOL EXIDINE 4% CHG 4OZ 29002-404

## (undated) DEVICE — SUCTION MANIFOLD NEPTUNE 2 SYS 4 PORT 0702-020-000

## (undated) DEVICE — SU WND CLOSURE VLOC 180 ABS 0 9" GS-21 VLOCL0346

## (undated) DEVICE — KIT PATIENT POSITIONING PIGAZZI LATEX FREE 40580

## (undated) DEVICE — Device

## (undated) DEVICE — ADH SKIN CLOSURE PREMIERPRO EXOFIN 1.0ML 3470

## (undated) DEVICE — TUBING IRRIG CYSTO/BLADDER SET 81" LF 2C4040

## (undated) RX ORDER — HEPARIN SODIUM 5000 [USP'U]/.5ML
INJECTION, SOLUTION INTRAVENOUS; SUBCUTANEOUS
Status: DISPENSED
Start: 2022-09-26

## (undated) RX ORDER — DEXAMETHASONE SODIUM PHOSPHATE 4 MG/ML
INJECTION, SOLUTION INTRA-ARTICULAR; INTRALESIONAL; INTRAMUSCULAR; INTRAVENOUS; SOFT TISSUE
Status: DISPENSED
Start: 2022-09-26

## (undated) RX ORDER — KETOROLAC TROMETHAMINE 30 MG/ML
INJECTION, SOLUTION INTRAMUSCULAR; INTRAVENOUS
Status: DISPENSED
Start: 2022-09-26

## (undated) RX ORDER — FENTANYL CITRATE 50 UG/ML
INJECTION, SOLUTION INTRAMUSCULAR; INTRAVENOUS
Status: DISPENSED
Start: 2022-09-26

## (undated) RX ORDER — SCOLOPAMINE TRANSDERMAL SYSTEM 1 MG/1
PATCH, EXTENDED RELEASE TRANSDERMAL
Status: DISPENSED
Start: 2022-09-26

## (undated) RX ORDER — PHENAZOPYRIDINE HYDROCHLORIDE 200 MG/1
TABLET, FILM COATED ORAL
Status: DISPENSED
Start: 2022-09-26

## (undated) RX ORDER — CEFAZOLIN SODIUM/WATER 3 G/30 ML
SYRINGE (ML) INTRAVENOUS
Status: DISPENSED
Start: 2022-09-26

## (undated) RX ORDER — METRONIDAZOLE 500 MG/100ML
INJECTION, SOLUTION INTRAVENOUS
Status: DISPENSED
Start: 2022-09-26

## (undated) RX ORDER — HYDROMORPHONE HYDROCHLORIDE 1 MG/ML
INJECTION, SOLUTION INTRAMUSCULAR; INTRAVENOUS; SUBCUTANEOUS
Status: DISPENSED
Start: 2022-09-26

## (undated) RX ORDER — IBUPROFEN 200 MG
TABLET ORAL
Status: DISPENSED
Start: 2022-09-26

## (undated) RX ORDER — HYDROMORPHONE HCL IN WATER/PF 6 MG/30 ML
PATIENT CONTROLLED ANALGESIA SYRINGE INTRAVENOUS
Status: DISPENSED
Start: 2022-09-26

## (undated) RX ORDER — FENTANYL CITRATE-0.9 % NACL/PF 10 MCG/ML
PLASTIC BAG, INJECTION (ML) INTRAVENOUS
Status: DISPENSED
Start: 2022-09-26

## (undated) RX ORDER — OXYCODONE HYDROCHLORIDE 5 MG/1
TABLET ORAL
Status: DISPENSED
Start: 2022-09-26